# Patient Record
Sex: FEMALE | Race: WHITE | NOT HISPANIC OR LATINO | Employment: OTHER
[De-identification: names, ages, dates, MRNs, and addresses within clinical notes are randomized per-mention and may not be internally consistent; named-entity substitution may affect disease eponyms.]

---

## 2017-10-01 ENCOUNTER — HEALTH MAINTENANCE LETTER (OUTPATIENT)
Age: 37
End: 2017-10-01

## 2018-02-07 ENCOUNTER — OFFICE VISIT (OUTPATIENT)
Dept: PEDIATRICS | Facility: CLINIC | Age: 38
End: 2018-02-07
Payer: COMMERCIAL

## 2018-02-07 VITALS
DIASTOLIC BLOOD PRESSURE: 78 MMHG | HEART RATE: 83 BPM | OXYGEN SATURATION: 99 % | SYSTOLIC BLOOD PRESSURE: 110 MMHG | BODY MASS INDEX: 24.06 KG/M2 | HEIGHT: 66 IN | WEIGHT: 149.7 LBS | TEMPERATURE: 97.3 F

## 2018-02-07 DIAGNOSIS — L98.9 SKIN LESION OF CHEEK: ICD-10-CM

## 2018-02-07 DIAGNOSIS — M54.50 BILATERAL LOW BACK PAIN WITHOUT SCIATICA, UNSPECIFIED CHRONICITY: ICD-10-CM

## 2018-02-07 DIAGNOSIS — Z98.890 HISTORY OF LUMBAR SURGERY: ICD-10-CM

## 2018-02-07 DIAGNOSIS — Z13.1 SCREENING FOR DIABETES MELLITUS (DM): ICD-10-CM

## 2018-02-07 DIAGNOSIS — Z00.00 ROUTINE GENERAL MEDICAL EXAMINATION AT A HEALTH CARE FACILITY: Primary | ICD-10-CM

## 2018-02-07 DIAGNOSIS — Z83.49 FAMILY HISTORY OF THYROID DISORDER: ICD-10-CM

## 2018-02-07 DIAGNOSIS — N39.0 RECURRENT UTI: ICD-10-CM

## 2018-02-07 DIAGNOSIS — Z13.29 SCREENING FOR THYROID DISORDER: ICD-10-CM

## 2018-02-07 DIAGNOSIS — Z13.0 SCREENING FOR DEFICIENCY ANEMIA: ICD-10-CM

## 2018-02-07 DIAGNOSIS — Z12.4 SCREENING FOR CERVICAL CANCER: ICD-10-CM

## 2018-02-07 DIAGNOSIS — Z13.6 CARDIOVASCULAR SCREENING; LDL GOAL LESS THAN 160: ICD-10-CM

## 2018-02-07 PROCEDURE — 87624 HPV HI-RISK TYP POOLED RSLT: CPT | Performed by: FAMILY MEDICINE

## 2018-02-07 PROCEDURE — G0145 SCR C/V CYTO,THINLAYER,RESCR: HCPCS | Performed by: FAMILY MEDICINE

## 2018-02-07 PROCEDURE — 99385 PREV VISIT NEW AGE 18-39: CPT | Performed by: FAMILY MEDICINE

## 2018-02-07 RX ORDER — NITROFURANTOIN 25; 75 MG/1; MG/1
1 CAPSULE ORAL
COMMUNITY
Start: 2015-05-11 | End: 2018-02-07

## 2018-02-07 RX ORDER — NITROFURANTOIN 25; 75 MG/1; MG/1
CAPSULE ORAL
Qty: 90 CAPSULE | Refills: 1 | Status: SHIPPED | OUTPATIENT
Start: 2018-02-07 | End: 2018-12-29

## 2018-02-07 ASSESSMENT — PAIN SCALES - GENERAL: PAINLEVEL: NO PAIN (0)

## 2018-02-07 NOTE — PATIENT INSTRUCTIONS
Reset my chart  [employer] Login ID: SHARMAINE       Schedule for skin consult  Schedule for sports consult  Schedule for fasting labs in 2 weeks        Preventive Health Recommendations  Female Ages 26 - 39  Yearly exam:   See your health care provider every year in order to    Review health changes.     Discuss preventive care.      Review your medicines if you your doctor has prescribed any.    Until age 30: Get a Pap test every three years (more often if you have had an abnormal result).    After age 30: Talk to your doctor about whether you should have a Pap test every 3 years or have a Pap test with HPV screening every 5 years.   You do not need a Pap test if your uterus was removed (hysterectomy) and you have not had cancer.  You should be tested each year for STDs (sexually transmitted diseases), if you're at risk.   Talk to your provider about how often to have your cholesterol checked.  If you are at risk for diabetes, you should have a diabetes test (fasting glucose).  Shots: Get a flu shot each year. Get a tetanus shot every 10 years.   Nutrition:     Eat at least 5 servings of fruits and vegetables each day.    Eat whole-grain bread, whole-wheat pasta and brown rice instead of white grains and rice.    Talk to your provider about Calcium and Vitamin D.     Lifestyle    Exercise at least 150 minutes a week (30 minutes a day, 5 days of the week). This will help you control your weight and prevent disease.    Limit alcohol to one drink per day.    No smoking.     Wear sunscreen to prevent skin cancer.    See your dentist every six months for an exam and cleaning.

## 2018-02-07 NOTE — NURSING NOTE
"Chief Complaint   Patient presents with     Establish Care     Physical       Initial /78 (BP Location: Right arm, Patient Position: Sitting, Cuff Size: Adult Large)  Pulse 83  Temp 97.3  F (36.3  C) (Oral)  Ht 5' 6.14\" (1.68 m)  Wt 149 lb 11.2 oz (67.9 kg)  LMP 01/22/2018 (Exact Date)  SpO2 99%  BMI 24.06 kg/m2 Estimated body mass index is 24.06 kg/(m^2) as calculated from the following:    Height as of this encounter: 5' 6.14\" (1.68 m).    Weight as of this encounter: 149 lb 11.2 oz (67.9 kg).  Medication Reconciliation: complete  Silvino aMrcos CMA    "

## 2018-02-07 NOTE — MR AVS SNAPSHOT
After Visit Summary   2/7/2018    Jaylin Day    MRN: [id number]           Patient Information     Date Of Birth          1980        Visit Information        Provider Department      2/7/2018 11:10 AM Parminder Taveras MD [facility]        Today's Diagnoses     Routine general medical examination at a health care facility    -  1    Recurrent UTI        CARDIOVASCULAR SCREENING; LDL GOAL LESS THAN 160        S/P lumbar spine operation        Bilateral low back pain without sciatica, unspecified chronicity        Skin lesion of cheek        Screening for cervical cancer          Care Instructions    Reset my chart  MyChart Login ID: [email or web address]       Schedule for skin consult  Schedule for sports consult  Schedule for fasting labs in 2 weeks        Preventive Health Recommendations  Female Ages 26 - 39  Yearly exam:   See your health care provider every year in order to    Review health changes.     Discuss preventive care.      Review your medicines if you your doctor has prescribed any.    Until age 30: Get a Pap test every three years (more often if you have had an abnormal result).    After age 30: Talk to your doctor about whether you should have a Pap test every 3 years or have a Pap test with HPV screening every 5 years.   You do not need a Pap test if your uterus was removed (hysterectomy) and you have not had cancer.  You should be tested each year for STDs (sexually transmitted diseases), if you're at risk.   Talk to your provider about how often to have your cholesterol checked.  If you are at risk for diabetes, you should have a diabetes test (fasting glucose).  Shots: Get a flu shot each year. Get a tetanus shot every 10 years.   Nutrition:     Eat at least 5 servings of fruits and vegetables each day.    Eat whole-grain bread, whole-wheat pasta and brown rice instead of white grains and rice.    Talk to your provider about Calcium and Vitamin D.      Lifestyle    Exercise at least 150 minutes a week (30 minutes a day, 5 days of the week). This will help you control your weight and prevent disease.    Limit alcohol to one drink per day.    No smoking.     Wear sunscreen to prevent skin cancer.    See your dentist every six months for an exam and cleaning.            Follow-ups after your visit        Additional Services     DERMATOLOGY REFERRAL       Your provider has referred you to: [facility]: [facility] [phone]   [email or web address]    Please be aware that coverage of these services is subject to the terms and limitations of your health insurance plan.  Call member services at your health plan with any benefit or coverage questions.      Please bring the following with you to your appointment:    (1) Any X-Rays, CTs or MRIs which have been performed.  Contact the facility where they were done to arrange for  prior to your scheduled appointment.    (2) List of current medications  (3) This referral request   (4) Any documents/labs given to you for this referral            SPORTS MEDICINE REFERRAL       Your provider has referred you to:  G: [facility] [phone]   [email or web address]/    Please be aware that coverage of these services is subject to the terms and limitations of your health insurance plan.  Call member services at your health plan with any benefit or coverage questions.      Please bring the following to your appointment:    >>   Any x-rays, CTs or MRIs which have been performed.  Contact the facility where they were done to arrange for  prior to your scheduled appointment.    >>   List of current medications   >>   This referral request   >>   Any documents/labs given to you for this referral                  Who to contact     If you have questions or need follow up information  "about today's clinic visit or your schedule please contact [facility] directly at [phone].  Normal or non-critical lab and imaging results will be communicated to you by Enlikenhart, letter or phone within 4 business days after the clinic has received the results. If you do not hear from us within 7 days, please contact the clinic through Enlikenhart or phone. If you have a critical or abnormal lab result, we will notify you by phone as soon as possible.  Submit refill requests through Oonair or call your pharmacy and they will forward the refill request to us. Please allow 3 business days for your refill to be completed.          Additional Information About Your Visit        EnlikenhariJento Information     [employer]air gives you secure access to your electronic health record. If you see a primary care provider, you can also send messages to your care team and make appointments. If you have questions, please call your primary care clinic.  If you do not have a primary care provider, please call [phone] and they will assist you.      Oonair is an electronic gateway that provides easy, online access to your medical records. With Oonair, you can request a clinic appointment, read your test results, renew a prescription or communicate with your care team.     To access your existing account, please contact your [facility] or call [phone] for assistance.        Care EveryWhere ID     This is your Care EveryWhere ID. This could be used by other organizations to access your [place] medical records  [id number]        Your Vitals Were     Pulse Temperature Height Last Period Pulse Oximetry BMI (Body Mass Index)    83 97.3  F (36.3  C) (Oral) 5' 6.14\" (1.68 m) 01/22/2018 (Exact Date) 99% 24.06 kg/m2       Blood Pressure from Last 3 Encounters:   02/07/18 110/78   12/10/12 116/78   08/16/12 102/76    Weight from Last 3 Encounters:   02/07/18 149 lb 11.2 oz (67.9 kg)   03/06/13 " 143 lb (64.9 kg)   12/10/12 149 lb (67.6 kg)              We Performed the Following     DERMATOLOGY REFERRAL     HPV High Risk Types DNA Cervical     Pap imaged thin layer screen with HPV - recommended age 30 - 65 years (select HPV order below)     SPORTS MEDICINE REFERRAL          Today's Medication Changes          These changes are accurate as of 2/7/18 11:57 AM.  If you have any questions, ask your nurse or doctor.               These medicines have changed or have updated prescriptions.        Dose/Directions    nitroFURantoin (macrocrystal-monohydrate) 100 MG capsule   Commonly known as:  MACROBID   This may have changed:    - how much to take  - how to take this  - when to take this  - additional instructions   Used for:  Recurrent UTI   Changed by:  Parminder Taveras MD        Take one capsule as needed after intercourse   Quantity:  90 capsule   Refills:  1            Where to get your medicines      These medications were sent to [id number] - [place], [place] - [address] AT [facility] [address], [facility] [address]     Phone:  [phone]     nitroFURantoin (macrocrystal-monohydrate) 100 MG capsule                Primary Care Provider Office Phone # Fax #    Parminder Taveras [phone] [address]  [facility] [address]        Equal Access to Services     [facility] AH: Hadii aad ku hadasho Soomaali, waaxda luqadaha, qaybta kaalmada adeegyada, twan barnes . So [facility] [phone].    ATENCIÓN: Si habla español, tiene a rosado disposición servicios gratuitos de asistencia lingüística. Llame al [phone].    We comply with applicable federal civil rights laws and [place] laws. We do not discriminate on the basis of race, color, national origin, age, disability, sex, sexual orientation, or gender identity.            Thank you!     Thank you for choosing [facility]  for your care.  Our goal is always to provide you with excellent care. Hearing back from our patients is one way we can continue to improve our services. Please take a few minutes to complete the written survey that you may receive in the mail after your visit with us. Thank you!             Your Updated Medication List - Protect others around you: Learn how to safely use, store and throw away your medicines at www.disposemymeds.org.          This list is accurate as of 2/7/18 11:57 AM.  Always use your most recent med list.                   Brand Name Dispense Instructions for use Diagnosis    ibuprofen 200 MG tablet    ADVIL/MOTRIN     Take 200-600 mg by mouth every 4 hours as needed for mild pain or moderate pain        MULTIVITAMIN TABS   OR      1 TABLET DAILY    Urinary tract infection, site not specified, UTI (urinary tract infection)       nitroFURantoin (macrocrystal-monohydrate) 100 MG capsule    MACROBID    90 capsule    Take one capsule as needed after intercourse    Recurrent UTI

## 2018-02-07 NOTE — PROGRESS NOTES
SUBJECTIVE:   CC: Jaylin Day is an 37 year old woman who presents for preventive health visit.     Healthy Habits:  This is a new patient to this provider,  here today to establish care and for annual physical.    Patient is switching clinics from [facility] due to change in insurance, was seen by [facility] provider 5 years ago.    Do you get at least three servings of calcium containing foods daily (dairy, green leafy vegetables, etc.)? yes    Amount of exercise or daily activities, outside of work: 3 day(s) per week    Problems taking medications regularly No    Medication side effects: No    Have you had an eye exam in the past two years? yes    Do you see a dentist twice per year? yes    Do you have sleep apnea, excessive snoring or daytime drowsiness?no      Pap smear done on this date: 4/14/14 (approximately), by this group: [facility], results were normal.           Today's PHQ-2 Score:   PHQ-2 ( 1999 Pfizer) 2/7/2018 8/16/2012   Q1: Little interest or pleasure in doing things 0 0   Q2: Feeling down, depressed or hopeless 0 0   PHQ-2 Score 0 0       Abuse: Current or Past(Physical, Sexual or Emotional)- No  Do you feel safe in your environment - Yes    Social History   Substance Use Topics     Smoking status: Former Smoker     Types: Cigarettes     Quit date: 1/1/2006     Smokeless tobacco: Never Used     Alcohol use Yes      Comment: rare     If you drink alcohol do you typically have >3 drinks per day or >7 drinks per week? No                     Reviewed orders with patient.  Reviewed health maintenance and updated orders accordingly - Yes  Labs reviewed in EPIC  BP Readings from Last 3 Encounters:   02/07/18 110/78   12/10/12 116/78   08/16/12 102/76    Wt Readings from Last 3 Encounters:   02/07/18 149 lb 11.2 oz (67.9 kg)   03/06/13 143 lb (64.9 kg)   12/10/12 149 lb (67.6 kg)                  Patient Active Problem List   Diagnosis     Recurrent UTI     CARDIOVASCULAR SCREENING; LDL GOAL LESS  THAN 160     Disorder of bursae and tendons in shoulder region     DUB (dysfunctional uterine bleeding)     S/P lumbar spine operation     Bilateral low back pain without sciatica, unspecified chronicity     History of lumbar surgery     Family history of thyroid disorder     Past Surgical History:   Procedure Laterality Date     DECOMPRESSION, FUSION LUMBAR POSTERIOR THREE + LEVELS, COMBINED         Social History   Substance Use Topics     Smoking status: Former Smoker     Types: Cigarettes     Quit date: 1/1/2006     Smokeless tobacco: Never Used     Alcohol use Yes      Comment: rare     Family History   Problem Relation Age of Onset     Hypertension Maternal Uncle      Hypertension Maternal Grandmother      C.A.D. No family hx of      DIABETES No family hx of      Thyroid Disease Son      hypothyroidism, congenital         Current Outpatient Prescriptions   Medication Sig Dispense Refill     nitroFURantoin, macrocrystal-monohydrate, (MACROBID) 100 MG capsule Take one capsule as needed after intercourse 90 capsule 1     ibuprofen (ADVIL,MOTRIN) 200 MG tablet Take 200-600 mg by mouth every 4 hours as needed for mild pain or moderate pain        MULTIVITAMIN TABS   OR 1 TABLET DAILY       No Known Allergies  Recent Labs   Lab Test  04/27/12   0825  10/29/11   0940   LDL  97   --    HDL  66   --    TRIG  84   --    ALT   --   27   CR   --   0.73   GFRESTIMATED   --   >90   GFRESTBLACK   --   >90   POTASSIUM   --   4.3   TSH  1.51   --         Mammogram not appropriate for this patient based on age.    Pertinent mammograms are reviewed under the imaging tab.  History of abnormal Pap smear: NO - age 30- 65 PAP every 3 years recommended    Reviewed and updated as needed this visit by clinical staff  Tobacco  Allergies  Meds         Reviewed and updated as needed this visit by Provider          Past Medical History:   Diagnosis Date     Female pelvic pain 2008    possible mild bicornuate or septate uterus      "Migraines     resolved      Preeclampsia      Recurrent UTI       Past Surgical History:   Procedure Laterality Date     DECOMPRESSION, FUSION LUMBAR POSTERIOR THREE + LEVELS, COMBINED       Obstetric History       T0      L3     SAB0   TAB0   Ectopic0   Multiple0   Live Births0       # Outcome Date GA Lbr Satish/2nd Weight Sex Delivery Anes PTL Lv   3 Para            2 Para            1 Para                   ROS:  C: NEGATIVE for fever, chills, change in weight  I: NEGATIVE -discolored skin on the left cheek  E: NEGATIVE for vision changes or irritation  ENT: NEGATIVE for ear, mouth and throat problems  R: NEGATIVE for significant cough or SOB  B: NEGATIVE for masses, tenderness or discharge  CV: NEGATIVE for chest pain, palpitations or peripheral edema  GI: NEGATIVE for nausea, abdominal pain, heartburn, or change in bowel habits  : NEGATIVE for unusual urinary or vaginal symptoms. Periods are regular.   female: History of recurrent UTI.  MUSCULOSKELETAL: History of intermittent low back pain, status post spine surgery in , wants to see  orthopedics for ongoing follow-up  N: NEGATIVE for weakness, dizziness or paresthesias  E: NEGATIVE for temperature intolerance, skin/hair changes  H: NEGATIVE for bleeding problems  P: NEGATIVE for changes in mood or affect    OBJECTIVE:   /78 (BP Location: Right arm, Patient Position: Sitting, Cuff Size: Adult Large)  Pulse 83  Temp 97.3  F (36.3  C) (Oral)  Ht 5' 6.14\" (1.68 m)  Wt 149 lb 11.2 oz (67.9 kg)  LMP 2018 (Exact Date)  SpO2 99%  BMI 24.06 kg/m2  EXAM:  GENERAL: healthy, alert and no distress  EYES: Eyes grossly normal to inspection, PERRL and conjunctivae and sclerae normal  HENT: ear canals and TM's normal, nose and mouth without ulcers or lesions  NECK: no adenopathy, no asymmetry, masses, or scars and thyroid normal to palpation  RESP: lungs clear to auscultation - no rales, rhonchi or wheezes  BREAST: normal without " masses, tenderness or nipple discharge and no palpable axillary masses or adenopathy  CV: regular rate and rhythm, normal S1 S2, no S3 or S4, no murmur, click or rub, no peripheral edema and peripheral pulses strong  ABDOMEN: soft, nontender, no hepatosplenomegaly, no masses and bowel sounds normal   (female): normal female external genitalia, normal urethral meatus, vaginal mucosa pink, moist, well rugated, and normal cervix/adnexa/uterus without masses or discharge  MS: no gross musculoskeletal defects noted, no edema  SKIN: About 2 cm, round area of dyschromia of the left cheek  NEURO: Normal strength and tone, mentation intact and speech normal  PSYCH: mentation appears normal, affect normal/bright    ASSESSMENT/PLAN:   1. Routine general medical examination at a health care facility  Discussed on regular exercises, healthy eating, self breast exams monthly and routine dental checks.    - Pap imaged thin layer screen with HPV - recommended age 30 - 65 years (select HPV order below)  - HPV High Risk Types DNA Cervical  - CBC with platelets differential; Future  - **Comprehensive metabolic panel FUTURE anytime; Future  - Lipid panel reflex to direct LDL Fasting; Future  - TSH with free T4 reflex; Future    2. Recurrent UTI  Prescription refill given for Macrodantin to use after intercourse as needed   reviewed prevention of recurrent UTI  - nitroFURantoin, macrocrystal-monohydrate, (MACROBID) 100 MG capsule; Take one capsule as needed after intercourse  Dispense: 90 capsule; Refill: 1    3. CARDIOVASCULAR SCREENING; LDL GOAL LESS THAN 160    - Lipid panel reflex to direct LDL Fasting; Future    4. Bilateral low back pain without sciatica, unspecified chronicity  Per patient's request, refer to sports medicine for further evaluation of her ongoing low back pain.  - SPORTS MEDICINE REFERRAL    5. Skin lesion of cheek  Per patient's request, dermatology referral was placed  - DERMATOLOGY REFERRAL    6. Screening  "for cervical cancer    - Pap imaged thin layer screen with HPV - recommended age 30 - 65 years (select HPV order below)  - HPV High Risk Types DNA Cervical    7. History of lumbar surgery    - SPORTS MEDICINE REFERRAL    8. Family history of thyroid disorder    - TSH with free T4 reflex; Future    9. Screening for thyroid disorder    - TSH with free T4 reflex; Future    10. Screening for deficiency anemia    - CBC with platelets differential; Future    11. Screening for diabetes mellitus (DM)    - **Comprehensive metabolic panel FUTURE anytime; Future    COUNSELING:   Reviewed preventive health counseling, as reflected in patient instructions  Special attention given to:        Regular exercise       Healthy diet/nutrition       Vision screening       Immunizations    Declined: Influenza due to Other                Contraception       Family planning       Folic Acid Counseling         reports that she quit smoking about 12 years ago. Her smoking use included Cigarettes. She has never used smokeless tobacco.    Estimated body mass index is 24.06 kg/(m^2) as calculated from the following:    Height as of this encounter: 5' 6.14\" (1.68 m).    Weight as of this encounter: 149 lb 11.2 oz (67.9 kg).       Counseling Resources:  ATP IV Guidelines  Pooled Cohorts Equation Calculator  Breast Cancer Risk Calculator  FRAX Risk Assessment  ICSI Preventive Guidelines  Dietary Guidelines for Americans, 2010  USDA's MyPlate  ASA Prophylaxis  Lung CA Screening    Parminder Taveras MD  [facility]  Chart documentation done in part with Dragon Voice recognition Software. Although reviewed after completion, some word and grammatical error may remain.    "

## 2018-02-09 LAB
COPATH REPORT: NORMAL
PAP: NORMAL

## 2018-02-14 LAB
FINAL DIAGNOSIS: NORMAL
HPV HR 12 DNA CVX QL NAA+PROBE: NEGATIVE
HPV16 DNA SPEC QL NAA+PROBE: NEGATIVE
HPV18 DNA SPEC QL NAA+PROBE: NEGATIVE
SPECIMEN DESCRIPTION: NORMAL
SPECIMEN SOURCE CVX/VAG CYTO: NORMAL

## 2018-02-21 ENCOUNTER — OFFICE VISIT (OUTPATIENT)
Dept: ORTHOPEDICS | Facility: CLINIC | Age: 38
End: 2018-02-21
Attending: FAMILY MEDICINE
Payer: COMMERCIAL

## 2018-02-21 VITALS
HEART RATE: 107 BPM | WEIGHT: 149 LBS | HEIGHT: 66 IN | DIASTOLIC BLOOD PRESSURE: 78 MMHG | SYSTOLIC BLOOD PRESSURE: 112 MMHG | BODY MASS INDEX: 23.95 KG/M2 | OXYGEN SATURATION: 99 %

## 2018-02-21 DIAGNOSIS — M54.16 LUMBAR RADICULOPATHY: Primary | ICD-10-CM

## 2018-02-21 DIAGNOSIS — Z13.6 CARDIOVASCULAR SCREENING; LDL GOAL LESS THAN 160: ICD-10-CM

## 2018-02-21 DIAGNOSIS — Z83.49 FAMILY HISTORY OF THYROID DISORDER: ICD-10-CM

## 2018-02-21 DIAGNOSIS — Z13.0 SCREENING FOR DEFICIENCY ANEMIA: ICD-10-CM

## 2018-02-21 DIAGNOSIS — Z13.1 SCREENING FOR DIABETES MELLITUS (DM): ICD-10-CM

## 2018-02-21 DIAGNOSIS — Z13.29 SCREENING FOR THYROID DISORDER: ICD-10-CM

## 2018-02-21 DIAGNOSIS — Z00.00 ROUTINE GENERAL MEDICAL EXAMINATION AT A HEALTH CARE FACILITY: ICD-10-CM

## 2018-02-21 LAB
ALBUMIN SERPL-MCNC: 3.6 G/DL (ref 3.4–5)
ALP SERPL-CCNC: 51 U/L (ref 40–150)
ALT SERPL W P-5'-P-CCNC: 21 U/L (ref 0–50)
ANION GAP SERPL CALCULATED.3IONS-SCNC: 6 MMOL/L (ref 3–14)
AST SERPL W P-5'-P-CCNC: 13 U/L (ref 0–45)
BASOPHILS # BLD AUTO: 0 10E9/L (ref 0–0.2)
BASOPHILS NFR BLD AUTO: 0.4 %
BILIRUB SERPL-MCNC: 0.5 MG/DL (ref 0.2–1.3)
BUN SERPL-MCNC: 14 MG/DL (ref 7–30)
CALCIUM SERPL-MCNC: 8.5 MG/DL (ref 8.5–10.1)
CHLORIDE SERPL-SCNC: 105 MMOL/L (ref 94–109)
CHOLEST SERPL-MCNC: 189 MG/DL
CO2 SERPL-SCNC: 27 MMOL/L (ref 20–32)
CREAT SERPL-MCNC: 0.61 MG/DL (ref 0.52–1.04)
DIFFERENTIAL METHOD BLD: NORMAL
EOSINOPHIL # BLD AUTO: 0.1 10E9/L (ref 0–0.7)
EOSINOPHIL NFR BLD AUTO: 1.6 %
ERYTHROCYTE [DISTWIDTH] IN BLOOD BY AUTOMATED COUNT: 12.5 % (ref 10–15)
GFR SERPL CREATININE-BSD FRML MDRD: >90 ML/MIN/1.7M2
GLUCOSE SERPL-MCNC: 94 MG/DL (ref 70–99)
HCT VFR BLD AUTO: 43.8 % (ref 35–47)
HDLC SERPL-MCNC: 69 MG/DL
HGB BLD-MCNC: 14.1 G/DL (ref 11.7–15.7)
IMM GRANULOCYTES # BLD: 0 10E9/L (ref 0–0.4)
IMM GRANULOCYTES NFR BLD: 0.4 %
LDLC SERPL CALC-MCNC: 103 MG/DL
LYMPHOCYTES # BLD AUTO: 1.8 10E9/L (ref 0.8–5.3)
LYMPHOCYTES NFR BLD AUTO: 31.9 %
MCH RBC QN AUTO: 29.7 PG (ref 26.5–33)
MCHC RBC AUTO-ENTMCNC: 32.2 G/DL (ref 31.5–36.5)
MCV RBC AUTO: 92 FL (ref 78–100)
MONOCYTES # BLD AUTO: 0.5 10E9/L (ref 0–1.3)
MONOCYTES NFR BLD AUTO: 8.1 %
NEUTROPHILS # BLD AUTO: 3.3 10E9/L (ref 1.6–8.3)
NEUTROPHILS NFR BLD AUTO: 57.6 %
NONHDLC SERPL-MCNC: 120 MG/DL
PLATELET # BLD AUTO: 252 10E9/L (ref 150–450)
POTASSIUM SERPL-SCNC: 4.2 MMOL/L (ref 3.4–5.3)
PROT SERPL-MCNC: 7.2 G/DL (ref 6.8–8.8)
RBC # BLD AUTO: 4.75 10E12/L (ref 3.8–5.2)
SODIUM SERPL-SCNC: 138 MMOL/L (ref 133–144)
TRIGL SERPL-MCNC: 83 MG/DL
TSH SERPL DL<=0.005 MIU/L-ACNC: 1.46 MU/L (ref 0.4–4)
WBC # BLD AUTO: 5.7 10E9/L (ref 4–11)

## 2018-02-21 PROCEDURE — 80050 GENERAL HEALTH PANEL: CPT | Performed by: FAMILY MEDICINE

## 2018-02-21 PROCEDURE — 80061 LIPID PANEL: CPT | Performed by: FAMILY MEDICINE

## 2018-02-21 PROCEDURE — 99213 OFFICE O/P EST LOW 20 MIN: CPT | Performed by: FAMILY MEDICINE

## 2018-02-21 PROCEDURE — 36415 COLL VENOUS BLD VENIPUNCTURE: CPT | Performed by: FAMILY MEDICINE

## 2018-02-21 ASSESSMENT — PAIN SCALES - GENERAL: PAINLEVEL: MILD PAIN (2)

## 2018-02-21 NOTE — LETTER
2/21/2018         RE: Jaylin Day  [address] [place] [address]        Dear Colleague,    Thank you for referring your patient, Jaylin Day, to the [facility]. Please see a copy of my visit note below.    HISTORY OF PRESENT ILLNESS  Ms. Day is a very pleasant 37 year old year old female who presents to clinic today to establish care at the request of Dr. Taveras.  Jaylin (Shelley) explains that she had a microdiscectomy in September 2016 for severe sciatica. Before surgery, she was a very active runner. She has also worked as a  for 18 years. She would like to begin an exercise routine but is worried about injuring her back and is here today with advice about beginning and exercise routine after 1.5 years of inactivity. She reports that she did not do PT after her surgery as advised by her neurosurgeon.  Location: lower back pain  Quality:  achy pain    Severity: 5/10 at worst    Duration: 1.5 years  Timing: occurs intermittently  Context: occurs while exercising and lifting  Modifying factors:  resting and non-use makes it better, movement and use makes it worse  Associated signs & symptoms: none  Previous similar pain: yes  Exercise: lifting weights and cardiovascular on machine  Additional history: as documented    MEDICAL HISTORY  Patient Active Problem List   Diagnosis     Recurrent UTI     CARDIOVASCULAR SCREENING; LDL GOAL LESS THAN 160     Disorder of bursae and tendons in shoulder region     DUB (dysfunctional uterine bleeding)     S/P lumbar spine operation     Bilateral low back pain without sciatica, unspecified chronicity     History of lumbar surgery     Family history of thyroid disorder       Current Outpatient Prescriptions   Medication Sig Dispense Refill     nitroFURantoin, macrocrystal-monohydrate, (MACROBID) 100 MG capsule Take one capsule as needed after intercourse 90 capsule 1     ibuprofen (ADVIL,MOTRIN) 200 MG tablet Take  "200-600 mg by mouth every 4 hours as needed for mild pain or moderate pain        MULTIVITAMIN TABS   OR 1 TABLET DAILY         Allergies   Allergen Reactions     Methocarbamol Hives       Family History   Problem Relation Age of Onset     Hypertension Maternal Grandmother      Thyroid Disease Son      hypothyroidism, congenital     Hypertension Maternal Uncle      C.A.D. No family hx of      DIABETES No family hx of        Additional medical/Social/Surgical histories reviewed in EPIC and updated as appropriate.     REVIEW OF SYSTEMS (2/21/2018)  10 point ROS of systems including Constitutional, Eyes, Respiratory, Cardiovascular, Gastroenterology, Genitourinary, Integumentary, Musculoskeletal, Psychiatric were all negative except for pertinent positives noted in my HPI.     PHYSICAL EXAM  Vitals:    02/21/18 1014   BP: 112/78   Pulse: 107   SpO2: 99%   Weight: 67.6 kg (149 lb)   Height: 1.68 m (5' 6.14\")     General  - normal appearance, in no obvious distress  CV  - normal peripheral perfusion  Pulm  - normal respiratory pattern, non-labored  Musculoskeletal - lumbar spine  - stance: normal gait without limp, no obvious leg length discrepancy, normal heel and toe walk  - inspection: normal bone and joint alignment, no obvious scoliosis  - palpation: no paravertebral or bony tenderness  - strength: lower extremities 5/5 in all planes  - special tests:  (-) slump test  Neuro  - patellar and Achilles DTRs 2+ bilaterally, grossly normal coordination, normal muscle tone  Skin  - no ecchymosis, erythema, warmth, or induration, no obvious rash  Psych  - interactive, appropriate, normal mood and affect      ASSESSMENT & PLAN    Mrs. Day is a pleasant 37 year old female s/p lumbar discectomy in 2016 presenting today with mild chronic back pain and sciatica.     Shelley seems to be doing quite well but still has twinges of pain on occasion.  She is mostly interested in discussing exercise and how to safely initiate " activity moving forward.    We had a long discussion centered around what is safe and not safe to do moving forward.  We discussed that impact activities are more likely to cause her pain, although I do think it is reasonable to discuss running.  If she does we should do this very carefully and safely to prevent injury.    I referred Shelley to PT. Additionally, we talked about the importance of re-establishing core strength; I suggest pilates.   She set a goal of a 5K this summer -as long as she is progressing well and is relatively pain-free I think this is reasonable.    I will see Ms. Day in follow up in 6 weeks time.    It was a pleasure meeting Shelley today.      Kaushik Lockett DO, [facility]      Again, thank you for allowing me to participate in the care of your patient.        Sincerely,        Kaushik Lockett DO

## 2018-02-21 NOTE — PROGRESS NOTES
HISTORY OF PRESENT ILLNESS  Ms. Day is a very pleasant 37 year old year old female who presents to clinic today to establish care at the request of Dr. Taveras.  Jaylin (Shelley) explains that she had a microdiscectomy in September 2016 for severe sciatica. Before surgery, she was a very active runner. She has also worked as a  for 18 years. She would like to begin an exercise routine but is worried about injuring her back and is here today with advice about beginning and exercise routine after 1.5 years of inactivity. She reports that she did not do PT after her surgery as advised by her neurosurgeon.  Location: lower back pain  Quality:  achy pain    Severity: 5/10 at worst    Duration: 1.5 years  Timing: occurs intermittently  Context: occurs while exercising and lifting  Modifying factors:  resting and non-use makes it better, movement and use makes it worse  Associated signs & symptoms: none  Previous similar pain: yes  Exercise: lifting weights and cardiovascular on machine  Additional history: as documented    MEDICAL HISTORY  Patient Active Problem List   Diagnosis     Recurrent UTI     CARDIOVASCULAR SCREENING; LDL GOAL LESS THAN 160     Disorder of bursae and tendons in shoulder region     DUB (dysfunctional uterine bleeding)     S/P lumbar spine operation     Bilateral low back pain without sciatica, unspecified chronicity     History of lumbar surgery     Family history of thyroid disorder       Current Outpatient Prescriptions   Medication Sig Dispense Refill     nitroFURantoin, macrocrystal-monohydrate, (MACROBID) 100 MG capsule Take one capsule as needed after intercourse 90 capsule 1     ibuprofen (ADVIL,MOTRIN) 200 MG tablet Take 200-600 mg by mouth every 4 hours as needed for mild pain or moderate pain        MULTIVITAMIN TABS   OR 1 TABLET DAILY         Allergies   Allergen Reactions     Methocarbamol Hives       Family History   Problem Relation Age of Onset     Hypertension Maternal  "Grandmother      Thyroid Disease Son      hypothyroidism, congenital     Hypertension Maternal Uncle      C.A.D. No family hx of      DIABETES No family hx of        Additional medical/Social/Surgical histories reviewed in [facility] and updated as appropriate.     REVIEW OF SYSTEMS (2/21/2018)  10 point ROS of systems including Constitutional, Eyes, Respiratory, Cardiovascular, Gastroenterology, Genitourinary, Integumentary, Musculoskeletal, Psychiatric were all negative except for pertinent positives noted in my HPI.     PHYSICAL EXAM  Vitals:    02/21/18 1014   BP: 112/78   Pulse: 107   SpO2: 99%   Weight: 67.6 kg (149 lb)   Height: 1.68 m (5' 6.14\")     General  - normal appearance, in no obvious distress  CV  - normal peripheral perfusion  Pulm  - normal respiratory pattern, non-labored  Musculoskeletal - lumbar spine  - stance: normal gait without limp, no obvious leg length discrepancy, normal heel and toe walk  - inspection: normal bone and joint alignment, no obvious scoliosis  - palpation: no paravertebral or bony tenderness  - strength: lower extremities 5/5 in all planes  - special tests:  (-) slump test  Neuro  - patellar and Achilles DTRs 2+ bilaterally, grossly normal coordination, normal muscle tone  Skin  - no ecchymosis, erythema, warmth, or induration, no obvious rash  Psych  - interactive, appropriate, normal mood and affect      ASSESSMENT & PLAN    Mrs. Day is a pleasant 37 year old female s/p lumbar discectomy in 2016 presenting today with mild chronic back pain and sciatica.     Shelley seems to be doing quite well but still has twinges of pain on occasion.  She is mostly interested in discussing exercise and how to safely initiate activity moving forward.    We had a long discussion centered around what is safe and not safe to do moving forward.  We discussed that impact activities are more likely to cause her pain, although I do think it is reasonable to discuss running.  If she does we should " do this very carefully and safely to prevent injury.    I referred Shelley to PT. Additionally, we talked about the importance of re-establishing core strength; I suggest pilates.   She set a goal of a 5K this summer -as long as she is progressing well and is relatively pain-free I think this is reasonable.    I will see Ms. Day in follow up in 6 weeks time.    It was a pleasure meeting Shelley today.      Kaushik Lockett DO, CAJONOM

## 2018-02-21 NOTE — MR AVS SNAPSHOT
After Visit Summary   2018    Jaylin Day    MRN: [id number]           Patient Information     Date Of Birth          1980        Visit Information        Provider Department      2018 10:20 AM Kaushik Lockett,  [facility]        Today's Diagnoses     Lumbar radiculopathy    -  1      Care Instructions    Thanks for coming today.  Ortho[facility]Sports Medicine Clinic  [address] [place] [address]    To schedule future appointments in Ortho Clinic, you may call [phone].    To schedule ordered imaging by your provider:   Call Central Imaging [phone]    To schedule an injection ordered by your provider:  Call Central Imaging Injection scheduling line: [phone]  [employer] available online at:  [email or web address]    Please call if any further questions or concerns ([phone]).  Clinic hours 8 am to 5 pm.    Return to clinic (call) if symptoms worsen or fail to improve.            Follow-ups after your visit        Additional Services     PHYSICAL THERAPY REFERRAL       Please eval and treat for chronic low back pain                  Your next 10 appointments already scheduled     Mar 02, 2018  9:00 AM CST   [facility]    [address] [place] [address] [phone]              Who to contact     If you have questions or need follow up information about today's clinic visit or your schedule please contact [facility] directly at [phone].  Normal or non-critical lab and imaging results will be communicated to you by MyChart, letter or phone within 4 business days after the clinic has received the results. If you do not hear from us within 7 days, please contact the clinic through MyChart or phone. If you have a critical or abnormal lab result, we will notify you by phone as soon as possible.  Submit refill  "requests through [employer] or call your pharmacy and they will forward the refill request to us. Please allow 3 business days for your refill to be completed.          Additional Information About Your Visit        [employer] Information     [employer] gives you secure access to your electronic health record. If you see a primary care provider, you can also send messages to your care team and make appointments. If you have questions, please call your primary care clinic.  If you do not have a primary care provider, please call [phone] and they will assist you.      [employer] is an electronic gateway that provides easy, online access to your medical records. With [employer], you can request a clinic appointment, read your test results, renew a prescription or communicate with your care team.     To access your existing account, please contact your [facility] or call [phone] for assistance.        Care EveryWhere ID     This is your Care EveryWhere ID. This could be used by other organizations to access your [place] medical records  [id number]        Your Vitals Were     Pulse Height Last Period Pulse Oximetry BMI (Body Mass Index)       107 1.68 m (5' 6.14\") 01/22/2018 (Exact Date) 99% 23.95 kg/m2        Blood Pressure from Last 3 Encounters:   02/21/18 112/78   02/07/18 110/78   12/10/12 116/78    Weight from Last 3 Encounters:   02/21/18 67.6 kg (149 lb)   02/07/18 67.9 kg (149 lb 11.2 oz)   03/06/13 64.9 kg (143 lb)              We Performed the Following     PHYSICAL THERAPY REFERRAL        Primary Care Provider Office Phone # Fax #    Parminder Taveras [phone]       [address]  [facility] [address]        Equal Access to Services     [facility] : Hadii zaina Pena, errol roger, twan evans. So [facility] [phone].    ATENCIÓN: Si habla español, tiene a rosado disposición servicios " soraida de asistencia lingüística. Nico mercado [phone].    We comply with applicable federal civil rights laws and [place] laws. We do not discriminate on the basis of race, color, national origin, age, disability, sex, sexual orientation, or gender identity.            Thank you!     Thank you for choosing [facility]  for your care. Our goal is always to provide you with excellent care. Hearing back from our patients is one way we can continue to improve our services. Please take a few minutes to complete the written survey that you may receive in the mail after your visit with us. Thank you!             Your Updated Medication List - Protect others around you: Learn how to safely use, store and throw away your medicines at www.disposemymeds.org.          This list is accurate as of 2/21/18 11:02 AM.  Always use your most recent med list.                   Brand Name Dispense Instructions for use Diagnosis    ibuprofen 200 MG tablet    ADVIL/MOTRIN     Take 200-600 mg by mouth every 4 hours as needed for mild pain or moderate pain        MULTIVITAMIN TABS   OR      1 TABLET DAILY    Urinary tract infection, site not specified, UTI (urinary tract infection)       nitroFURantoin (macrocrystal-monohydrate) 100 MG capsule    MACROBID    90 capsule    Take one capsule as needed after intercourse    Recurrent UTI

## 2018-02-21 NOTE — PATIENT INSTRUCTIONS
Thanks for coming today.  Ortho[facility]Sports Medicine Clinic  [address] [place] [address]    To schedule future appointments in Ortho Clinic, you may call [phone].    To schedule ordered imaging by your provider:   Call Central Imaging [phone]    To schedule an injection ordered by your provider:  Call Central Imaging Injection scheduling line: [phone]  [email or web address] available online at:  [email or web address]    Please call if any further questions or concerns ([phone]).  Clinic hours 8 am to 5 pm.    Return to clinic (call) if symptoms worsen or fail to improve.

## 2018-02-21 NOTE — PROGRESS NOTES
Rosendo Mosqueda,  The test results on thyroid functions, hemoglobin and blood count, liver and kidney functions, fasting cholesterol are all normal and within range.  Please continue with your efforts on healthy eating and regular exercises.   Let me know if you have any questions. Take care.  Parminder Taveras MD

## 2018-02-21 NOTE — NURSING NOTE
"Jaylin Day's goals for this visit include: est. Care for lumbar back pain.   She requests these members of her care team be copied on today's visit information: yes    PCP: Parminder Taveras    Referring Provider:  Parminder Taveras [place]  [address] [place] [address]    Chief Complaint   Patient presents with     Consult     bilateral low back pain without sciatica.pt states she had back surgery 09/2016.        Initial /78  Pulse 107  Ht 1.68 m (5' 6.14\")  Wt 67.6 kg (149 lb)  LMP 01/22/2018 (Exact Date)  SpO2 99%  BMI 23.95 kg/m2 Estimated body mass index is 23.95 kg/(m^2) as calculated from the following:    Height as of this encounter: 1.68 m (5' 6.14\").    Weight as of this encounter: 67.6 kg (149 lb).  Medication Reconciliation: complete    "

## 2018-02-28 ENCOUNTER — THERAPY VISIT (OUTPATIENT)
Dept: PHYSICAL THERAPY | Facility: CLINIC | Age: 38
End: 2018-02-28
Payer: COMMERCIAL

## 2018-02-28 DIAGNOSIS — M54.50 BILATERAL LOW BACK PAIN WITHOUT SCIATICA, UNSPECIFIED CHRONICITY: Primary | ICD-10-CM

## 2018-02-28 PROBLEM — M54.16 LUMBAR RADICULOPATHY: Status: ACTIVE | Noted: 2018-02-28

## 2018-02-28 PROCEDURE — 97161 PT EVAL LOW COMPLEX 20 MIN: CPT | Mod: GP | Performed by: PHYSICAL THERAPIST

## 2018-02-28 PROCEDURE — 97530 THERAPEUTIC ACTIVITIES: CPT | Mod: GP | Performed by: PHYSICAL THERAPIST

## 2018-02-28 PROCEDURE — 97110 THERAPEUTIC EXERCISES: CPT | Mod: GP | Performed by: PHYSICAL THERAPIST

## 2018-02-28 NOTE — PROGRESS NOTES
[facility] Initial Evaluation -- Lumbar    Date: February 28, 2018  Jaylin Day is a 37 year old female with a lumbar condition.   Referral: Dr. Lockett - orthopedics  Work mechanical stresses:    Employment status:  Working full time  Leisure mechanical stresses: would like to get back to hiking, running and working out (exercise)  Functional disability score (MIMI/STarT Back):  18%, low   VAS score (0-10): 2/10  Patient goals/expectations:  Alleviate symptoms.  Learn a safe way to return to activity without aggravating her lower back.     HISTORY:    Present symptoms: ache across the lower back with tingling down L LE into the great toe.  Pain quality (sharp/shooting/stabbing/aching/burning/cramping):  Ache lower back, tingling LE   Paresthesia (yes/no):  no    Present since (onset date): Patient notes symptoms began in 2015 as a dull ache for the L LE.  Symptoms progressively worsened and she had surgery in September 2016 (microdisectomy).  She was better after surgery, however, her symptoms never fully resolved.   Patient feels that she has been better for the past 6 months (best she has been since the symptoms began in 2015).  She would like to increase her activity level without injuring herself.     Symptoms (improving/unchanging/worsening):  improving.     Symptoms commenced as a result of: unknown   Condition occurred in the following environment:   ?     Symptoms at onset (back/thigh/leg): L LE  Constant symptoms (back/thigh/leg): low back  Intermittent symptoms (back/thigh/leg): L thigh, lower leg, foot    Symptoms are made worse with the following: Always Bending, Always Sitting, Sometimes Rising, Time of day - Always PM and Other - vacuuming, mopping - always   Symptoms are made better with the following: Always Walking and Sometimes On the move, sometime with standing    Disturbed sleep (yes/no):  Yes (3-4 times a week)   Sleeping postures (prone/sup/side R/L): either  side    Previous episodes (0/1-5/6-10/11+): 1 Year of first episode:     Previous history: no previous history of lower back pain or LE symptoms prior to the onset of the L LE in .   Previous treatments: did try 2 CARMEN prior to surgery.  Would have a week of relief, but then, no change.      Specific Questions:  Cough/Sneeze/Strain (pos/neg): negative  Bowel/Bladder (normal/abnormal): normal   Gait (normal/abnormal): normal  Medications (nil/NSAIDS/analg/steroids/anticoag/other):  NSAIDS and OTC analgesic if needed  Medical allergies:  Methocarbamol  General health (excellent/good/fair/poor):  good  Pertinent medical history:  None  Imaging (None/Xray/MRI/Other):  MRI prior to surgery; no imaging since  Recent or major surgery (yes/no):  Lumbar disectomy ?L4-5  Night pain (yes/no): wakes due to lower back but that is not when her symptoms are at their worst.  Accidents (yes/no): no  Unexplained weight loss (yes/no): no  Barriers at home: no  Other red flags: no    EXAMINATION    Posture:   Sitting (good/fair/poor): fair  Standing (good/fair/poor):good  Lordosis (red/acc/normal): normal to slightly reduced  Correction of posture (better/worse/no effect): better    Lateral Shift (right/left/nil): nil  Relevant (yes/no):  no  Other Observations: slight deviation to the R with EIS.    Neurological:    Motor deficit:  5/5 myotome strength for B LEs  Reflexes:  Brisk and symmetrical   Sensory deficit:  Symmetrical to light touch  Dural signs:  Negative seated SLR, B.    Movement Loss:   Giorgio Mod Min Nil Pain   Flexion   x  Pulling in B calves, worse on L   Extension x x   Increased L LBP W dev R   Side Gliding R    x Increased L LBP   Side Gliding L   x  Increased L LBP     Test Movements:   During: produces, abolishes, increases, decreases, no effect, centralizing, peripheralizing   After: better, worse, no better, no worse, no effect, centralized, peripheralized    Pretest symptoms standin/10 left > R low back  pain   Symptoms During Symptoms After ROM increased ROM decreased No Effect   FIS          Rep FIS          EIS          Rep EIS          Pretest symptoms lying: low back ache, 2/10    Symptoms During Symptoms After ROM increased ROM decreased No Effect   JANET          Rep JANET          EIL Increases  PDM    No Worse      x   Rep EIL Increases  PDM, ROM increased W reps    Better       X for FIS and LROM felt stiff    If required, pretest symptoms: 1-2/10 low back ache   Symptoms During Symptoms After ROM increased ROM decreased No Effect   SGIS - R          Rep SGIS - R          SGIS - L Increases  PDM    No Worse         Rep SGIS - L Decreases  Increased ROM    Better    x       Static Tests:  Sitting slouched:  NT  Sitting erect:  Support felt good; without increased L sided pain  Standing slouched NT  Standing erect:  NT  Lying prone in extension:  Decreased ache Long sitting:  NT    Other Tests: EIL with R SG - 10 reps - felt better than without shift; ROM felt about the same as after R SGIS.    Provisional Classification:  Derangement - Asymmetrical, unilateral, symptoms below knee    Principle of Management:  Education:  Discussed wanting to alleviate symptoms in the L LE and foot.  Did much better with a lateral procedure (L SGIS).  No worsening of symptoms with EIL W L SG.  When did not shift hips, did well with initial set, but second set caused a worsening of LROM, no change in symptoms.  Discussed we need to avoid activity that brings on tingling L LE symptoms and goal to centralize back pain (centralization/peripheralization).  Assess ROM before and after exercise with a goal of full and painfree LROM     Equipment provided:  Lumbar roll  Mechanical therapy (Y/N):  yes   Extension principle:  EIL with L SG 10 reps every 2 hours if reaches a plateau with L SGIS  Lateral Principle:  L SGIS 10 reps per 2 hours  Flexion principle:  no  Other:  no    ASSESSMENT/PLAN:    Patient is a 37 year old female with  lumbar complaints.    Patient has the following significant findings with corresponding treatment plan.                Diagnosis 1:  Low back pain with referral into L LE (derangement)    Pain -  manual therapy, self management, education, directional preference exercise and home program  Decreased ROM/flexibility - manual therapy, therapeutic exercise and home program  Decreased joint mobility - manual therapy, therapeutic exercise and home program  Impaired muscle performance - neuro re-education and home program  Decreased function - therapeutic activities and home program  Impaired posture - neuro re-education, therapeutic activities and home program    Therapy Evaluation Codes:   1) History comprised of:   Personal factors that impact the plan of care:      None.    Comorbidity factors that impact the plan of care are:      None.     Medications impacting care: None.  2) Examination of Body Systems comprised of:   Body structures and functions that impact the plan of care:      Lumbar spine.   Activity limitations that impact the plan of care are:      Bending, Sitting, Standing, Sleeping and lifting.  3) Clinical presentation characteristics are:   Stable/Uncomplicated.  4) Decision-Making    Low complexity using standardized patient assessment instrument and/or measureable assessment of functional outcome.  Cumulative Therapy Evaluation is: Low complexity.    Previous and current functional limitations:  (See Goal Flow Sheet for this information)    Short term and Long term goals: (See Goal Flow Sheet for this information)     Communication ability:  Patient appears to be able to clearly communicate and understand verbal and written communication and follow directions correctly.  Treatment Explanation - The following has been discussed with the patient:   RX ordered/plan of care  Anticipated outcomes  Possible risks and side effects  This patient would benefit from PT intervention to resume normal activities.    Rehab potential is excellent.  Patient had a decrease in pain, improvement in LROM with L SGIS and EIL W/ L SG.  (better response to the L SGIS)    Frequency:  1 X week, once daily  Duration:  for 6-8 visits  Discharge Plan:  Achieve all LTG.  Independent in home treatment program.  Reach maximal therapeutic benefit.    Please refer to the daily flowsheet for treatment today, total treatment time and time spent performing 1:1 timed codes.

## 2018-03-02 ENCOUNTER — OFFICE VISIT (OUTPATIENT)
Dept: DERMATOLOGY | Facility: CLINIC | Age: 38
End: 2018-03-02
Attending: FAMILY MEDICINE
Payer: COMMERCIAL

## 2018-03-02 ENCOUNTER — TELEPHONE (OUTPATIENT)
Dept: DERMATOLOGY | Facility: CLINIC | Age: 38
End: 2018-03-02

## 2018-03-02 DIAGNOSIS — L71.9 ROSACEA: ICD-10-CM

## 2018-03-02 DIAGNOSIS — L73.2 HIDRADENITIS SUPPURATIVA: ICD-10-CM

## 2018-03-02 DIAGNOSIS — L70.0 ACNE VULGARIS: Primary | ICD-10-CM

## 2018-03-02 PROCEDURE — 99202 OFFICE O/P NEW SF 15 MIN: CPT | Performed by: DERMATOLOGY

## 2018-03-02 RX ORDER — METRONIDAZOLE 10 MG/G
GEL TOPICAL DAILY
Qty: 60 G | Refills: 5 | Status: SHIPPED | OUTPATIENT
Start: 2018-03-02 | End: 2019-01-07

## 2018-03-02 NOTE — MR AVS SNAPSHOT
After Visit Summary   3/2/2018    Jaylin Day    MRN: [id number]           Patient Information     Date Of Birth          1980        Visit Information        Provider Department      3/2/2018 9:00 AM Ashley Torres MD [facility]        Today's Diagnoses     Acne vulgaris    -  1    Rosacea        Hidradenitis suppurativa          Care Instructions    Consider pulsed dye laser    2 medications on the market are Mirvaso and rhofade          Follow-ups after your visit        Follow-up notes from your care team     Return in about 8 months (around 11/2/2018) for schedule pulse PDL.      Your next 10 appointments already scheduled     Mar 07, 2018  3:30 PM CST   RODER[facility] Spine with Sylvia Mariano PTA   [facility] ([facility]  )    [address]  [facility] [address]   [phone]            Mar 14, 2018 11:00 AM CDT   RODERICK Spine with Sylvia Mariano PTA   [facility] ([facility]  )    [address]  [facility] [address] [phone]            Mar 21, 2018 11:00 AM CDT   RODERICK Spine with Tiesha Zambrano, PT   [facility] ([facility]  )    [address]  [facility] [address]   [phone]            Mar 28, 2018 11:40 AM CDT   RODERICK Spine with Tiesha Zambrano, PT   [facility] ([facility]  )    [address]  [facility] [address] [phone]            Apr 04, 2018 11:00 AM CDT   MEDSPINE RTN with Kaushik Lockett DO   [facility] ([facility])    [address]  [facility] [address] [phone]              Who to contact     If you have questions or need follow up information about today's clinic visit or your schedule please contact [facility] directly at [phone].  Normal or non-critical lab and imaging results  will be communicated to you by Crowdbaronhart, letter or phone within 4 business days after the clinic has received the results. If you do not hear from us within 7 days, please contact the clinic through miDrive or phone. If you have a critical or abnormal lab result, we will notify you by phone as soon as possible.  Submit refill requests through miDrive or call your pharmacy and they will forward the refill request to us. Please allow 3 business days for your refill to be completed.          Additional Information About Your Visit        miDrive Information     miDrive gives you secure access to your electronic health record. If you see a primary care provider, you can also send messages to your care team and make appointments. If you have questions, please call your primary care clinic.  If you do not have a primary care provider, please call [phone] and they will assist you.      miDrive is an electronic gateway that provides easy, online access to your medical records. With miDrive, you can request a clinic appointment, read your test results, renew a prescription or communicate with your care team.     To access your existing account, please contact your [facility] or call [phone] for assistance.        Care EveryWhere ID     This is your Care EveryWhere ID. This could be used by other organizations to access your [place] medical records  [id number]         Blood Pressure from Last 3 Encounters:   02/21/18 112/78   02/07/18 110/78   12/10/12 116/78    Weight from Last 3 Encounters:   02/21/18 67.6 kg (149 lb)   02/07/18 67.9 kg (149 lb 11.2 oz)   03/06/13 64.9 kg (143 lb)              Today, you had the following     No orders found for display         Today's Medication Changes          These changes are accurate as of 3/2/18  9:44 AM.  If you have any questions, ask your nurse or doctor.               Start taking these medicines.        Dose/Directions    metroNIDAZOLE  1 % gel   Commonly known as:  METROGEL   Used for:  Rosacea   Started by:  Ashley Torres MD        Apply topically daily Apply once daily   Quantity:  60 g   Refills:  5       oxymetazoline HCl 1 % cream   Commonly known as:  RHOFADE   Used for:  Rosacea   Started by:  Ashley Torres MD        Apply a pea-sized amount once daily to the entire face or affected area avoiding the eyes and lips. Wash hands immediately after applying.   Quantity:  30 g   Refills:  11            Where to get your medicines      These medications were sent to [id number] - [place], [place] - [address] AT [facility] [address], [facility] [address]     Phone:  [phone]     metroNIDAZOLE 1 % gel    oxymetazoline HCl 1 % cream                Primary Care Provider Office Phone # Fax #    Parminder Tvaeras [phone] [address]  [facility] [address]        Equal Access to Services     [facility] AH: Hadii aad ku hadasho Soomaali, waaxda luqadaha, qaybta kaalmada adeegyada, waxay santhoshin hayrafael barnes . So [facility] [phone].    ATENCIÓN: Si habla español, tiene a rosado disposición servicios gratuitos de asistencia lingüística. Ll[facility] [phone].    We comply with applicable federal civil rights laws and [place] laws. We do not discriminate on the basis of race, color, national origin, age, disability, sex, sexual orientation, or gender identity.            Thank you!     Thank you for choosing [facility]  for your care. Our goal is always to provide you with excellent care. Hearing back from our patients is one way we can continue to improve our services. Please take a few minutes to complete the written survey that you may receive in the mail after your visit with us. Thank you!             Your Updated Medication List - Protect others around you: Learn how to safely use, store and throw away your medicines at  www.disposemymeds.org.          This list is accurate as of 3/2/18  9:44 AM.  Always use your most recent med list.                   Brand Name Dispense Instructions for use Diagnosis    ibuprofen 200 MG tablet    ADVIL/MOTRIN     Take 200-600 mg by mouth every 4 hours as needed for mild pain or moderate pain        metroNIDAZOLE 1 % gel    METROGEL    60 g    Apply topically daily Apply once daily    Rosacea       MULTIVITAMIN TABS   OR      1 TABLET DAILY    Urinary tract infection, site not specified, UTI (urinary tract infection)       nitroFURantoin (macrocrystal-monohydrate) 100 MG capsule    MACROBID    90 capsule    Take one capsule as needed after intercourse    Recurrent UTI       oxymetazoline HCl 1 % cream    RHOFADE    30 g    Apply a pea-sized amount once daily to the entire face or affected area avoiding the eyes and lips. Wash hands immediately after applying.    Rosacea

## 2018-03-02 NOTE — TELEPHONE ENCOUNTER
I called and spoke with Milton at [employer]- No prior authorization is required for CPT 03155,52635,20180 PDL treatment - Patient has a $2,500 deductible once met will have 75/25 coverage until max out of pocket is met $7,150. Patient will have to pay out of pocket up to deductible then will pay 25% of all services until max o/p is met. There are no current accumulations on the plan [id number]

## 2018-03-02 NOTE — TELEPHONE ENCOUNTER
----- Message from Ashley Torres MD sent at 3/2/2018  9:38 AM CST -----  Regarding: letter rosacea  Re:Request for laser coverage    To Whom This May Concern,    We are writing to request coverage of pulsed dye laser. This patient has symptoms of including redness.     This type of rosacea (erythematotelangiectatic type) responds well to PDL. We would expect 1 treatments every 6 weeks for a maximum of 3-6 treatments.     The CPT Code Description utilized would be 70021: Unlisted procedure, skin, mucous membrane and subcutaneous tissue.  The pricing is between $150 to up to $450 per treatment.       Other treatments that can be considered include mirvaso or rhofade. WE have initiated rhofade. However, this is a monthly cost to you and the patient and will not change the course of the disease. With this subtype of rosacea, metronidazole topicals will not work, as it is not associated with acne or pustules.       We strive to provide our patient with outstanding care. Therefore, I request you please contact me at [phone] if you have any questions regarding coverage or our treatment rational.     Ashley Torres MD    Department of Dermatology  [facility]  Phone([facility]): [phone]  Phone([facility]): [phone]

## 2018-03-02 NOTE — NURSING NOTE
Dermatology Rooming Note    Jaylin Day's goals for this visit include:   Chief Complaint   Patient presents with     Derm Problem     Redness on left cheek - has gotten larger - has tried 'everything' to make the redness go away     Derm Problem     Acne - Calderon & Alvin       Is a scribe okay for this visit: YES    Are records needed for this visit(If yes, obtain release of information): NO     Vitals: There were no vitals taken for this visit.    Referring Provider:  Parminder Taveras MD  [address] [place] [address]    [facility]

## 2018-03-02 NOTE — LETTER
March 19, 2018      Jaylin Day  [address] [place] [address]              Dear Betina Mosqueda! The clinic has been unable to reach you by phone and would like to get in touch with you. We would like to discuss your medical care that has been requested.  Please give us a call at [phone]. If you reach the voice mail, please leave a message with your name, date of birth, daytime telephone number and a date and time that someone can contact you.   We look forward to speaking with you soon.    Sincerely,      Dermatology  [facility]  Phone: [phone]         Sincerely,      Ashley Torres MD

## 2018-03-02 NOTE — LETTER
3/2/2018         RE: Jaylin Day  [address] [place] [address]        Dear Colleague,    Thank you for referring your patient, Jaylin Day, to the [facility]. Please see a copy of my visit note below.    [facility] Dermatology Note      Dermatology Problem List:  1.new patient  2. Acne rosacea     Encounter Date: Mar 2, 2018    CC:  Chief Complaint   Patient presents with     Derm Problem     Redness on left cheek - has gotten larger - has tried 'everything' to make the redness go away     Derm Problem     Acne - Calderon & Fields         History of Present Illness:  Ms. Jaylin Day is a 37 year old female who presents for acne evaluation. She is referral from Dr. Taveras from 2/7/2018. The patient reports that she has redness on the left cheek that has increased in size, she has tried everything to make this redness resolve. The patient reports that she is having a lot of acne as if she is a teenager. She has 4 day long regular periods. The patient also reports that she has been using rodan and fields.     Denies pain with flushing. The patient reports a gritty sensation in the eyes on occasion.     She is not having children right now, nor does she plan to in the future.     Past Medical History:   Patient Active Problem List   Diagnosis     Recurrent UTI     CARDIOVASCULAR SCREENING; LDL GOAL LESS THAN 160     Disorder of bursae and tendons in shoulder region     DUB (dysfunctional uterine bleeding)     S/P lumbar spine operation     Bilateral low back pain without sciatica, unspecified chronicity     History of lumbar surgery     Family history of thyroid disorder     Past Medical History:   Diagnosis Date     Female pelvic pain 2008    possible mild bicornuate or septate uterus     Migraines 2004    resolved      Preeclampsia      Recurrent UTI      Past Surgical History:   Procedure Laterality Date     DECOMPRESSION, FUSION LUMBAR POSTERIOR  THREE + LEVELS, COMBINED         Social History:  The patient is a .     Family History:  The patient has no famiyl history of skin cancer.     Medications:  Current Outpatient Prescriptions   Medication Sig Dispense Refill     nitroFURantoin, macrocrystal-monohydrate, (MACROBID) 100 MG capsule Take one capsule as needed after intercourse 90 capsule 1     ibuprofen (ADVIL,MOTRIN) 200 MG tablet Take 200-600 mg by mouth every 4 hours as needed for mild pain or moderate pain        MULTIVITAMIN TABS   OR 1 TABLET DAILY         Allergies   Allergen Reactions     Methocarbamol Hives       Review of Systems:  -Constitutional: The patient is feeling generally well.   -Skin: As above in HPI. No additional skin concerns.    Physical exam:  Vitals: There were no vitals taken for this visit.  GEN: This is a well developed, well-nourished male in no acute distress, in a pleasant mood.    SKIN: Acne exam, which includes the face, neck, upper central chest, and upper central back was performed.   -Chest and back are clear.   -Telangiectasias on the cheeks and nose, primarily on the left cheek.   -No other lesions of concern on areas examined.       Impression/Plan:  1. Rosacea-ET type, no acne today though patient report it. Will start topicals     Sun precaution was advised including the use of sun screens of SPF 30 or higher, sun protective clothing, and avoidance of tanning beds  Discussed PDL treatment. Discussed need for multiple treatments, risks and cost. Discussed to expect between 3-6 treatments spaced 4 weeks apart and then approximately every year maintenance therapy. Discussed that this considered a cosmetic procedure and is generally an out of pocket expense. Discussed taht the patient cannot be tan prior to treatment.   Discussed mirvaso as a daily treatment. Discussed risks, cost, and need for daily use.  Discussed risks of irritation and rebound with discontinuation of use.   Discussed rhofade as daily  treatment. Discussed risks, cost, and need for daily use.  Discussed risks of irritation and rebound with discontinuation of use. She would to go ahead with use.     Discussed possibility of Ocular rosacea and Symptoms of this condition which include, eyes that feel dry, irritated, or gritty, burning. Rec that she see eye doctor and discuss her symptoms and rosacea.     For acne prevention, Start metronidazole gel once daily, morning or night       Follow-up in 3 months, earlier for new or changing lesions.       Staff Involved:  Scribe/Staff      Scribe Disclosure:   I, Kerry Mcpherson, am serving as a scribe to document services personally performed by Dr. Ashley Torres, based on data collection and the provider's statements to me.       Provider Disclosure:   The documentation recorded by the scribe accurately reflects the services I personally performed and the decisions made by me.    Ashley Torres MD    Department of Dermatology  [facility]: Phone: [phone], Fax:[phone]  [facility]: Phone: [phone], Fax: [phone]        Again, thank you for allowing me to participate in the care of your patient.        Sincerely,        Ashley Torres MD

## 2018-03-02 NOTE — PROGRESS NOTES
[facility] Dermatology Note      Dermatology Problem List:  1.new patient  2. Acne rosacea     Encounter Date: Mar 2, 2018    CC:  Chief Complaint   Patient presents with     Derm Problem     Redness on left cheek - has gotten larger - has tried 'everything' to make the redness go away     Derm Problem     Acne - [employer]         History of Present Illness:  Ms. Jaylin Day is a 37 year old female who presents for acne evaluation. She is referral from Dr. Taveras from 2/7/2018. The patient reports that she has redness on the left cheek that has increased in size, she has tried everything to make this redness resolve. The patient reports that she is having a lot of acne as if she is a teenager. She has 4 day long regular periods. The patient also reports that she has been using rodan and fields.     Denies pain with flushing. The patient reports a gritty sensation in the eyes on occasion.     She is not having children right now, nor does she plan to in the future.     Past Medical History:   Patient Active Problem List   Diagnosis     Recurrent UTI     CARDIOVASCULAR SCREENING; LDL GOAL LESS THAN 160     Disorder of bursae and tendons in shoulder region     DUB (dysfunctional uterine bleeding)     S/P lumbar spine operation     Bilateral low back pain without sciatica, unspecified chronicity     History of lumbar surgery     Family history of thyroid disorder     Past Medical History:   Diagnosis Date     Female pelvic pain 2008    possible mild bicornuate or septate uterus     Migraines 2004    resolved      Preeclampsia      Recurrent UTI      Past Surgical History:   Procedure Laterality Date     DECOMPRESSION, FUSION LUMBAR POSTERIOR THREE + LEVELS, COMBINED         Social History:  The patient is a .     Family History:  The patient has no famiyl history of skin cancer.     Medications:  Current Outpatient Prescriptions   Medication Sig Dispense Refill     nitroFURantoin,  macrocrystal-monohydrate, (MACROBID) 100 MG capsule Take one capsule as needed after intercourse 90 capsule 1     ibuprofen (ADVIL,MOTRIN) 200 MG tablet Take 200-600 mg by mouth every 4 hours as needed for mild pain or moderate pain        MULTIVITAMIN TABS   OR 1 TABLET DAILY         Allergies   Allergen Reactions     Methocarbamol Hives       Review of Systems:  -Constitutional: The patient is feeling generally well.   -Skin: As above in HPI. No additional skin concerns.    Physical exam:  Vitals: There were no vitals taken for this visit.  GEN: This is a well developed, well-nourished male in no acute distress, in a pleasant mood.    SKIN: Acne exam, which includes the face, neck, upper central chest, and upper central back was performed.   -Chest and back are clear.   -Telangiectasias on the cheeks and nose, primarily on the left cheek.   -No other lesions of concern on areas examined.       Impression/Plan:  1. Rosacea-ET type, no acne today though patient report it. Will start topicals     Sun precaution was advised including the use of sun screens of SPF 30 or higher, sun protective clothing, and avoidance of tanning beds  Discussed PDL treatment. Discussed need for multiple treatments, risks and cost. Discussed to expect between 3-6 treatments spaced 4 weeks apart and then approximately every year maintenance therapy. Discussed that this considered a cosmetic procedure and is generally an out of pocket expense. Discussed taht the patient cannot be tan prior to treatment.   Discussed mirvaso as a daily treatment. Discussed risks, cost, and need for daily use.  Discussed risks of irritation and rebound with discontinuation of use.   Discussed rhofade as daily treatment. Discussed risks, cost, and need for daily use.  Discussed risks of irritation and rebound with discontinuation of use. She would to go ahead with use.     Discussed possibility of Ocular rosacea and Symptoms of this condition which include,  eyes that feel dry, irritated, or gritty, burning. Rec that she see eye doctor and discuss her symptoms and rosacea.     For acne prevention, Start metronidazole gel once daily, morning or night       Follow-up in 3 months, earlier for new or changing lesions.       Staff Involved:  Scribe/Staff      Scribe Disclosure:   I, Kerry Ky, am serving as a scribe to document services personally performed by Dr. Ashley Torres, based on data collection and the provider's statements to me.       Provider Disclosure:   The documentation recorded by the scribe accurately reflects the services I personally performed and the decisions made by me.    Ashley Torres MD    Department of Dermatology  [facility]: Phone: [phone], Fax:[phone]  [facility]: Phone: [phone], Fax: [phone]

## 2018-03-07 ENCOUNTER — THERAPY VISIT (OUTPATIENT)
Dept: PHYSICAL THERAPY | Facility: CLINIC | Age: 38
End: 2018-03-07
Payer: COMMERCIAL

## 2018-03-07 DIAGNOSIS — M54.50 LOW BACK PAIN: Primary | ICD-10-CM

## 2018-03-07 PROCEDURE — 97112 NEUROMUSCULAR REEDUCATION: CPT | Mod: GP | Performed by: PHYSICAL THERAPY ASSISTANT

## 2018-03-07 PROCEDURE — 97110 THERAPEUTIC EXERCISES: CPT | Mod: GP | Performed by: PHYSICAL THERAPY ASSISTANT

## 2018-03-14 ENCOUNTER — THERAPY VISIT (OUTPATIENT)
Dept: PHYSICAL THERAPY | Facility: CLINIC | Age: 38
End: 2018-03-14
Payer: COMMERCIAL

## 2018-03-14 DIAGNOSIS — M54.50 LOW BACK PAIN: ICD-10-CM

## 2018-03-14 PROCEDURE — 97110 THERAPEUTIC EXERCISES: CPT | Mod: GP | Performed by: PHYSICAL THERAPY ASSISTANT

## 2018-03-14 PROCEDURE — 97112 NEUROMUSCULAR REEDUCATION: CPT | Mod: GP | Performed by: PHYSICAL THERAPY ASSISTANT

## 2018-03-19 NOTE — TELEPHONE ENCOUNTER
Left message for patient to call [phone].  Will send letter due to no response from the patient.    Mary Jernigan, CMA

## 2018-03-20 ENCOUNTER — TELEPHONE (OUTPATIENT)
Dept: DERMATOLOGY | Facility: CLINIC | Age: 38
End: 2018-03-20

## 2018-03-20 DIAGNOSIS — L71.9 ROSACEA: Primary | ICD-10-CM

## 2018-03-20 NOTE — TELEPHONE ENCOUNTER
Prior auth started for Rhofade and Metrogel.  Patient notified.  See 3/20/18 telephone encounter.  Adriana Rodriguez RN

## 2018-03-20 NOTE — TELEPHONE ENCOUNTER
I confirmed with Thomas that prior authorization is needed for Rhofade and Metrogel.  Submitted to PA team.  Patient notified.    Adriana Rodriguez RN

## 2018-03-21 ENCOUNTER — THERAPY VISIT (OUTPATIENT)
Dept: PHYSICAL THERAPY | Facility: CLINIC | Age: 38
End: 2018-03-21
Payer: COMMERCIAL

## 2018-03-21 DIAGNOSIS — M54.50 LOW BACK PAIN: ICD-10-CM

## 2018-03-21 PROCEDURE — 97110 THERAPEUTIC EXERCISES: CPT | Mod: GP | Performed by: PHYSICAL THERAPIST

## 2018-03-21 PROCEDURE — 97112 NEUROMUSCULAR REEDUCATION: CPT | Mod: GP | Performed by: PHYSICAL THERAPIST

## 2018-03-21 NOTE — TELEPHONE ENCOUNTER
Central Prior Authorization Team   Phone: [phone]    PA Initiation    Medication: oxymetazoline HCl (RHOFADE) 1 % cream  Insurance Company: [employer] - Phone [phone] Fax [phone]  Pharmacy Filling the Rx: [facility] [place], [place] - [address] AT [facility] & [facility]  Filling Pharmacy Phone: [phone]  Filling Pharmacy Fax: [phone]  Start Date: 3/21/2018

## 2018-03-28 ENCOUNTER — THERAPY VISIT (OUTPATIENT)
Dept: PHYSICAL THERAPY | Facility: CLINIC | Age: 38
End: 2018-03-28
Payer: COMMERCIAL

## 2018-03-28 DIAGNOSIS — M54.50 LOW BACK PAIN: ICD-10-CM

## 2018-03-28 PROCEDURE — 97110 THERAPEUTIC EXERCISES: CPT | Mod: GP | Performed by: PHYSICAL THERAPIST

## 2018-04-03 RX ORDER — BRIMONIDINE 5 MG/G
GEL TOPICAL
Qty: 30 G | Refills: 11 | Status: CANCELLED | OUTPATIENT
Start: 2018-04-03

## 2018-04-03 NOTE — TELEPHONE ENCOUNTER
PRIOR AUTHORIZATION DENIED    Medication: oxymetazoline HCl (RHOFADE) 1 % cream - denied    Denial Date: 3/29/2018    Denial Rational: script is denied because pt must try/fail Mirvaso              Appeal Information:

## 2018-04-03 NOTE — TELEPHONE ENCOUNTER
I left a message for patient to call [facility].  Mirvaso orlando'd up.  Please review risks with patient before sending prescription.  Adriana Rodriguez RN

## 2018-04-10 NOTE — TELEPHONE ENCOUNTER
I left a message for patient to call [facility].  Three attempts to reach patient.  Closing encounter.  Pharmacy notified.  Adriana Rodriguez RN

## 2018-04-11 ENCOUNTER — THERAPY VISIT (OUTPATIENT)
Dept: PHYSICAL THERAPY | Facility: CLINIC | Age: 38
End: 2018-04-11
Payer: COMMERCIAL

## 2018-04-11 DIAGNOSIS — M54.50 LOW BACK PAIN: ICD-10-CM

## 2018-04-11 PROCEDURE — 97110 THERAPEUTIC EXERCISES: CPT | Mod: GP | Performed by: PHYSICAL THERAPY ASSISTANT

## 2018-04-18 ENCOUNTER — THERAPY VISIT (OUTPATIENT)
Dept: PHYSICAL THERAPY | Facility: CLINIC | Age: 38
End: 2018-04-18
Payer: COMMERCIAL

## 2018-04-18 DIAGNOSIS — M54.50 LOW BACK PAIN: ICD-10-CM

## 2018-04-18 PROCEDURE — 97110 THERAPEUTIC EXERCISES: CPT | Mod: GP | Performed by: PHYSICAL THERAPY ASSISTANT

## 2018-05-02 ENCOUNTER — THERAPY VISIT (OUTPATIENT)
Dept: PHYSICAL THERAPY | Facility: CLINIC | Age: 38
End: 2018-05-02
Payer: COMMERCIAL

## 2018-05-02 ENCOUNTER — OFFICE VISIT (OUTPATIENT)
Dept: ORTHOPEDICS | Facility: CLINIC | Age: 38
End: 2018-05-02
Payer: COMMERCIAL

## 2018-05-02 VITALS — SYSTOLIC BLOOD PRESSURE: 123 MMHG | DIASTOLIC BLOOD PRESSURE: 83 MMHG | OXYGEN SATURATION: 99 % | HEART RATE: 80 BPM

## 2018-05-02 DIAGNOSIS — M54.50 LOW BACK PAIN: ICD-10-CM

## 2018-05-02 DIAGNOSIS — M54.16 LUMBAR RADICULOPATHY: Primary | ICD-10-CM

## 2018-05-02 PROCEDURE — 97110 THERAPEUTIC EXERCISES: CPT | Mod: GP | Performed by: PHYSICAL THERAPY ASSISTANT

## 2018-05-02 PROCEDURE — 99213 OFFICE O/P EST LOW 20 MIN: CPT | Performed by: FAMILY MEDICINE

## 2018-05-02 ASSESSMENT — PAIN SCALES - GENERAL: PAINLEVEL: MILD PAIN (2)

## 2018-05-02 NOTE — NURSING NOTE
"Jaylin Day's goals for this visit include: lumbar back follow up   She requests these members of her care team be copied on today's visit information: yes    PCP: Parminder Taveras    Referring Provider:  No referring provider defined for this encounter.    Chief Complaint   Patient presents with     RECHECK     lumbar back pain follow up        Initial /83  Pulse 80  SpO2 99% Estimated body mass index is 23.95 kg/(m^2) as calculated from the following:    Height as of 2/21/18: 1.68 m (5' 6.14\").    Weight as of 2/21/18: 67.6 kg (149 lb).  Medication Reconciliation: complete    "

## 2018-05-02 NOTE — PROGRESS NOTES
HISTORY OF PRESENT ILLNESS  Ms. Day is a pleasant 37 year old year old female following up with lumbar radiculopathy.  Jaylin has been in physical therapy for the past few months.  I spoke with her physical therapist, Sylvia, just before the visit.  Sylvia endorses that Jaylin has been doing quite well in therapy.  Her strength and functionality have improved.  Back bending is still an issue.  Jaylin reports that she is feeling great.  She agrees the back bending insulin issue for her, she does get some left low back pain that radiates slightly into her hip but this is tolerable.  She would like to begin the transition back to running, if able.  Additional history: as documented      REVIEW OF SYSTEMS (5/2/2018)  10 point ROS of systems including Constitutional, Eyes, Respiratory, Cardiovascular, Gastroenterology, Genitourinary, Integumentary, Musculoskeletal, Psychiatric were all negative except for pertinent positives noted in my HPI.     PHYSICAL EXAM  Vitals:    05/02/18 1101   BP: 123/83   Pulse: 80   SpO2: 99%     General  - normal appearance, in no obvious distress  CV  - normal peripheral perfusion  Pulm  - normal respiratory pattern, non-labored  Musculoskeletal - lumbar spine  - stance: normal gait  - inspection: normal bone and joint alignment, no obvious scoliosis  - palpation: no tenderness  - ROM: no pain with bilateral rotation, flexion past 30 deg, extension  - strength: lower extremities 5/5 in all planes  - special tests:  (-) slump test  Neuro  - patellar and Achilles DTRs 2+ bilaterally, no sensory or motor deficit, grossly normal coordination, normal muscle tone  Skin  - no ecchymosis, erythema, warmth, or induration, no obvious rash  Psych  - interactive, appropriate, normal mood and affect        ASSESSMENT & PLAN  Ms. Day is a 37 year old year old female following up with lumbar radiculopathy.    Jaylin has been progressing quite well in therapy.  I do think it is reasonable to begin a  slow, methodical return to running.  We did discuss that while running is a relatively risky activity, for her the benefits may outweigh the risk.    If Jaylin experiences a return of pain she knows to back off and avoid exacerbating activity.  If she does experience a return of pain I would likely order an MR.    Otherwise Jaylin can follow up as needed.    It was a pleasure seeing Jaylin.        Kaushik Lockett, DO, CAM

## 2018-05-02 NOTE — MR AVS SNAPSHOT
After Visit Summary   2018    Jaylin Day    MRN: [id number]           Patient Information     Date Of Birth          1980        Visit Information        Provider Department      2018 11:00 AM Kaushik Lockett,  [facility]        Today's Diagnoses     Lumbar radiculopathy    -  1      Care Instructions    Thanks for coming today.  Ortho/Sports Medicine Clinic  [address] [place] [address]    To schedule future appointments in Ortho Clinic, you may call [phone].    To schedule ordered imaging by your provider:   Call Central Imaging [phone]    To schedule an injection ordered by your provider:  Call Central Imaging Injection scheduling line: [phone]  [facility] available online at:  [email or web address]    Please call if any further questions or concerns ([phone]).  Clinic hours 8 am to 5 pm.    Return to clinic (call) if symptoms worsen or fail to improve.            Follow-ups after your visit        Your next 10 appointments already scheduled     May 16, 2018 11:40 AM LETY   [facility] Spine with Sylvia Mariano PTA   [facility] ([facility]  )    [address] [facility] [address] [phone]              Who to contact     If you have questions or need follow up information about today's clinic visit or your schedule please contact [facility] directly at [phone].  Normal or non-critical lab and imaging results will be communicated to you by MyChart, letter or phone within 4 business days after the clinic has received the results. If you do not hear from us within 7 days, please contact the clinic through [employer] or phone. If you have a critical or abnormal lab result, we will notify you by phone as soon as possible.  Submit refill requests through [employer] or call your pharmacy and they will forward the refill request to us. Please allow 3 business  days for your refill to be completed.          Additional Information About Your Visit        [employer] Information     [employer] gives you secure access to your electronic health record. If you see a primary care provider, you can also send messages to your care team and make appointments. If you have questions, please call your primary care clinic.  If you do not have a primary care provider, please call [phone] and they will assist you.      [employer] is an electronic gateway that provides easy, online access to your medical records. With [employer], you can request a clinic appointment, read your test results, renew a prescription or communicate with your care team.     To access your existing account, please contact your [facility] or call [phone] for assistance.        Care EveryWhere ID     This is your Care EveryWhere ID. This could be used by other organizations to access your [facility] medical records  [id number]        Your Vitals Were     Pulse Pulse Oximetry                80 99%           Blood Pressure from Last 3 Encounters:   05/02/18 123/83   02/21/18 112/78   02/07/18 110/78    Weight from Last 3 Encounters:   02/21/18 67.6 kg (149 lb)   02/07/18 67.9 kg (149 lb 11.2 oz)   03/06/13 64.9 kg (143 lb)              Today, you had the following     No orders found for display       Primary Care Provider Office Phone # Fax #    Parminder Taveras [phone]       [address]  [facility] [address]        Equal Access to Services     CICI BURKS : Hadii zaina ku hadasho Soomaali, waaxda luqadaha, qaybta kaalmada adeegyada, twan barnes . So [facility] [phone].    ATENCIÓN: Si habla español, tiene a rosado disposición servicios gratuitos de asistencia lingüística. Llame al [phone].    We comply with applicable federal civil rights laws and [place] laws. We do not discriminate on the basis of race, color, national origin,  age, disability, sex, sexual orientation, or gender identity.            Thank you!     Thank you for choosing [facility]  for your care. Our goal is always to provide you with excellent care. Hearing back from our patients is one way we can continue to improve our services. Please take a few minutes to complete the written survey that you may receive in the mail after your visit with us. Thank you!             Your Updated Medication List - Protect others around you: Learn how to safely use, store and throw away your medicines at www.disposemymeds.org.          This list is accurate as of 5/2/18 11:29 AM.  Always use your most recent med list.                   Brand Name Dispense Instructions for use Diagnosis    ibuprofen 200 MG tablet    ADVIL/MOTRIN     Take 200-600 mg by mouth every 4 hours as needed for mild pain or moderate pain        metroNIDAZOLE 1 % gel    METROGEL    60 g    Apply topically daily Apply once daily    Rosacea       MULTIVITAMIN TABS   OR      1 TABLET DAILY    Urinary tract infection, site not specified, UTI (urinary tract infection)       nitroFURantoin (macrocrystal-monohydrate) 100 MG capsule    MACROBID    90 capsule    Take one capsule as needed after intercourse    Recurrent UTI

## 2018-05-02 NOTE — PATIENT INSTRUCTIONS
Thanks for coming today.  Ortho/Sports Medicine Clinic  [address] [place] [address]    To schedule future appointments in Ortho Clinic, you may call [phone].    To schedule ordered imaging by your provider:   Call Central Imaging [phone]    To schedule an injection ordered by your provider:  Call Central Imaging Injection scheduling line: [phone]  [employer] available online at:  [email or web address]    Please call if any further questions or concerns ([phone]).  Clinic hours 8 am to 5 pm.    Return to clinic (call) if symptoms worsen or fail to improve.

## 2018-05-02 NOTE — PROGRESS NOTES
Subjective:  HPI  Oswestry Score: 12 %                 Objective:  System    Physical Exam    General     ROS    Assessment/Plan:    PROGRESS  REPORT    Progress reporting period is from 2-28-18 to 5-2-18.       SUBJECTIVE  Subjective changes noted by patient: Pt reports feeling she pulled a muscle after last session and had to stop her HEP for a few days but has been able to resume without difficulty overall. Does note she has taken out a few exercises due to soreness. Since starting PT has noticed progress in regards to strength in core and upper body, no longer having pain in L lower leg-pain is now centralized at L hip/low back. Biggest worry is that she is fearful that pain on L side will always be there. Also wondering if she will ever be able to run/jog again.     Current Pain level: 1/10.      Initial Pain level:  (will take Ibuprofen when pain is a 4/10 so doesn't worsen).   Changes in function:  Yes (See Goal flowsheet attached for changes in current functional level)  Adverse reaction to treatment or activity: 2 weeks ago had a muscle pull after PT-unsure if from exercise or from something she did at home; took a few days off of HEP and was able to resume without difficulty (but did hold on that particular exercise)    OBJECTIVE    Changes noted in objective findings:  Yes, Improved flexion and sideglides. Continues to have limited extension ROM that does not change mechanically but does improve after strengthening exercises. Oswestry has improved from 18% to 12%.     Today's Objective: Current sym: L hip laterally; LROM: FIS: min loss; SGIS: symmetrical NE; EIS mod loss with ERP @ L LB     ASSESSMENT/PLAN  Updated problem list and treatment plan: Diagnosis 1:  L LBP with radicular symptoms  Pain -  self management, education and home program  Decreased ROM/flexibility - therapeutic exercise and home program  Decreased strength - therapeutic exercise, therapeutic activities and home program  Decreased  function - therapeutic activities and home program  STG/LTGs have been met or progress has been made towards goals:  Yes (See Goal flow sheet completed today.)  Assessment of Progress: The patient's condition is improving.  The patient's condition has potential to improve.  Patient is meeting short term goals and is progressing towards long term goals.  Self Management Plans:  Patient has been instructed in a home treatment program.  Patient  has been instructed in self management of symptoms.  I have discussed this patient with primary PT and find that the nature, scope, duration and intensity of the therapy is appropriate for the medical condition of the patient.  Jaylin continues to require the following intervention to meet STG and LTG's:  PT    Recommendations:  This patient would benefit from further evaluation.  Please advice about walk/run program and when okay to begin.     The progress note/discharge summary was written in collaboration with and reviewed by the physical therapist.    Please refer to the daily flowsheet for treatment today, total treatment time and time spent performing 1:1 timed codes.          2

## 2018-05-02 NOTE — LETTER
5/2/2018         RE: Jaylin Day  [address] [facility] [address]        Dear Colleague,    Thank you for referring your patient, Jaylin Day, to the [facility]. Please see a copy of my visit note below.    HISTORY OF PRESENT ILLNESS  Ms. Day is a pleasant 37 year old year old female following up with lumbar radiculopathy.  Jaylin has been in physical therapy for the past few months.  I spoke with her physical therapist, Sylvia, just before the visit.  Sylvia endorses that Jaylin has been doing quite well in therapy.  Her strength and functionality have improved.  Back bending is still an issue.  Jaylin reports that she is feeling great.  She agrees the back bending insulin issue for her, she does get some left low back pain that radiates slightly into her hip but this is tolerable.  She would like to begin the transition back to running, if able.  Additional history: as documented      REVIEW OF SYSTEMS (5/2/2018)  10 point ROS of systems including Constitutional, Eyes, Respiratory, Cardiovascular, Gastroenterology, Genitourinary, Integumentary, Musculoskeletal, Psychiatric were all negative except for pertinent positives noted in my HPI.     PHYSICAL EXAM  Vitals:    05/02/18 1101   BP: 123/83   Pulse: 80   SpO2: 99%     General  - normal appearance, in no obvious distress  CV  - normal peripheral perfusion  Pulm  - normal respiratory pattern, non-labored  Musculoskeletal - lumbar spine  - stance: normal gait  - inspection: normal bone and joint alignment, no obvious scoliosis  - palpation: no tenderness  - ROM: no pain with bilateral rotation, flexion past 30 deg, extension  - strength: lower extremities 5/5 in all planes  - special tests:  (-) slump test  Neuro  - patellar and Achilles DTRs 2+ bilaterally, no sensory or motor deficit, grossly normal coordination, normal muscle tone  Skin  - no ecchymosis, erythema, warmth, or induration, no obvious rash  Psych  -  interactive, appropriate, normal mood and affect        ASSESSMENT & PLAN  Ms. Day is a 37 year old year old female following up with lumbar radiculopathy.    Jaylin has been progressing quite well in therapy.  I do think it is reasonable to begin a slow, methodical return to running.  We did discuss that while running is a relatively risky activity, for her the benefits may outweigh the risk.    If Jaylin experiences a return of pain she knows to back off and avoid exacerbating activity.  If she does experience a return of pain I would likely order an MR.    Otherwise Jaylin can follow up as needed.    It was a pleasure seeing Jaylin.        Kaushik Lockett DO, CAM          Again, thank you for allowing me to participate in the care of your patient.        Sincerely,        Kaushik Lockett DO

## 2018-05-02 NOTE — MR AVS SNAPSHOT
After Visit Summary   5/2/2018    Jaylin Day    MRN: [id number]           Patient Information     Date Of Birth          1980        Visit Information        Provider Department      5/2/2018 10:20 AM Sylvia Mariano PTA [facility]        Today's Diagnoses     Low back pain           Follow-ups after your visit        Your next 10 appointments already scheduled     May 16, 2018 11:40 AM CDT   [facility] with Sylvia Mariano PTA   [facility] ([facility]  )    [address]  [facility] [address] [phone]              Who to contact     If you have questions or need follow up information about today's clinic visit or your schedule please contact [facility] directly at [phone].  Normal or non-critical lab and imaging results will be communicated to you by [employer], letter or phone within 4 business days after the clinic has received the results. If you do not hear from us within 7 days, please contact the clinic through [employer] or phone. If you have a critical or abnormal lab result, we will notify you by phone as soon as possible.  Submit refill requests through [employer] or call your pharmacy and they will forward the refill request to us. Please allow 3 business days for your refill to be completed.          Additional Information About Your Visit        MyChart Information     [employer] gives you secure access to your electronic health record. If you see a primary care provider, you can also send messages to your care team and make appointments. If you have questions, please call your primary care clinic.  If you do not have a primary care provider, please call [phone] and they will assist you.        Care EveryWhere ID     This is your Care EveryWhere ID. This could be used by other organizations to access your [place] medical records  [id number]         Blood  Pressure from Last 3 Encounters:   05/02/18 123/83   02/21/18 112/78   02/07/18 110/78    Weight from Last 3 Encounters:   02/21/18 67.6 kg (149 lb)   02/07/18 67.9 kg (149 lb 11.2 oz)   03/06/13 64.9 kg (143 lb)              We Performed the Following     [facility] PROGRESS NOTES REPORT     THERAPEUTIC EXERCISES        Primary Care Provider Office Phone # Fax #    Parminder Taveras [phone] [address]  [facility] [address]        Equal Access to Services     [facility]: Hadii aad ku hadasho Soomaali, waaxda luqadaha, qaybta kaalmada adeflaquitayada, twan barnes . So [place] [phone].    ATENCIÓN: Si habla español, tiene a rosado disposición servicios gratuitos de asistencia lingüística. Llame al [phone].    We comply with applicable federal civil rights laws and [place] laws. We do not discriminate on the basis of race, color, national origin, age, disability, sex, sexual orientation, or gender identity.            Thank you!     Thank you for choosing [facility]  for your care. Our goal is always to provide you with excellent care. Hearing back from our patients is one way we can continue to improve our services. Please take a few minutes to complete the written survey that you may receive in the mail after your visit with us. Thank you!             Your Updated Medication List - Protect others around you: Learn how to safely use, store and throw away your medicines at www.disposemymeds.org.          This list is accurate as of 5/2/18 11:08 AM.  Always use your most recent med list.                   Brand Name Dispense Instructions for use Diagnosis    ibuprofen 200 MG tablet    ADVIL/MOTRIN     Take 200-600 mg by mouth every 4 hours as needed for mild pain or moderate pain        metroNIDAZOLE 1 % gel    METROGEL    60 g    Apply topically daily Apply once daily    Rosacea       MULTIVITAMIN TABS   OR      1 TABLET  DAILY    Urinary tract infection, site not specified, UTI (urinary tract infection)       nitroFURantoin (macrocrystal-monohydrate) 100 MG capsule    MACROBID    90 capsule    Take one capsule as needed after intercourse    Recurrent UTI

## 2018-06-24 ENCOUNTER — OFFICE VISIT (OUTPATIENT)
Dept: URGENT CARE | Facility: URGENT CARE | Age: 38
End: 2018-06-24
Payer: COMMERCIAL

## 2018-06-24 VITALS
HEART RATE: 79 BPM | DIASTOLIC BLOOD PRESSURE: 84 MMHG | BODY MASS INDEX: 25.23 KG/M2 | SYSTOLIC BLOOD PRESSURE: 136 MMHG | WEIGHT: 157 LBS | TEMPERATURE: 97.6 F | OXYGEN SATURATION: 99 % | RESPIRATION RATE: 18 BRPM

## 2018-06-24 DIAGNOSIS — R42 DIZZINESS: Primary | ICD-10-CM

## 2018-06-24 DIAGNOSIS — F41.1 ANXIETY REACTION: ICD-10-CM

## 2018-06-24 PROCEDURE — 99213 OFFICE O/P EST LOW 20 MIN: CPT | Performed by: FAMILY MEDICINE

## 2018-06-24 RX ORDER — MECLIZINE HYDROCHLORIDE 25 MG/1
25 TABLET ORAL EVERY 6 HOURS PRN
Qty: 30 TABLET | Refills: 0 | Status: SHIPPED | OUTPATIENT
Start: 2018-06-24 | End: 2019-01-07

## 2018-06-24 ASSESSMENT — ANXIETY QUESTIONNAIRES
GAD7 TOTAL SCORE: 4
5. BEING SO RESTLESS THAT IT IS HARD TO SIT STILL: NOT AT ALL
1. FEELING NERVOUS, ANXIOUS, OR ON EDGE: SEVERAL DAYS
2. NOT BEING ABLE TO STOP OR CONTROL WORRYING: SEVERAL DAYS
7. FEELING AFRAID AS IF SOMETHING AWFUL MIGHT HAPPEN: NOT AT ALL
3. WORRYING TOO MUCH ABOUT DIFFERENT THINGS: NOT AT ALL
6. BECOMING EASILY ANNOYED OR IRRITABLE: SEVERAL DAYS

## 2018-06-24 ASSESSMENT — PATIENT HEALTH QUESTIONNAIRE - PHQ9: 5. POOR APPETITE OR OVEREATING: SEVERAL DAYS

## 2018-06-24 NOTE — PATIENT INSTRUCTIONS
Anxiety Reaction  Anxiety is the feeling we all get when we think something bad might happen. It is a normal response to stress and usually causes only a mild reaction. When anxiety becomes more severe, it can interfere with daily life. In some cases, you may not even be aware of what it is you re anxious about. There may also be a genetic link or it may be a learned behavior in the home.  Both psychological and physical triggers cause stress reaction. It's often a response to fear or emotional stress, real or imagined. This stress may come from home, family, work, or social relationships.  During an anxiety reaction, you may feel:    Helpless    Nervous    Depressed    Irritable  Your body may show signs of anxiety in many ways. You may experience:    Dry mouth    Shakiness    Dizziness    Weakness    Trouble breathing    Breathing fast (hyperventilating)    Chest pressure    Sweating    Headache    Nausea    Diarrhea    Tiredness    Inability to sleep    Sexual problems  Home care    Try to locate the sources of stress in your life. They may not be obvious. These may include:  ? Daily hassles of life (such as traffic jams, missed appointments, or car troubles)  ? Major life changes, both good (new baby or job promotion) and bad (loss of job or loss of loved one)  ? Overload: feeling that you have too many responsibilities and can't take care of all of them at once  ? Feeling helpless or feeling that your problems are beyond what you re able to solve    Notice how your body reacts to stress. Learn to listen to your body signals. This will help you take action before the stress becomes severe.    When you can, do something about the source of your stress. (Avoid hassles, limit the amount of change that happens in your life at one time and take a break when you feel overloaded).    Unfortunately, many stressful situations can't be avoided. It is necessary to learn how to better manage stress. There are many proven  methods that will reduce your anxiety. These include simple things like exercise, good nutrition, and adequate rest. Also, there are certain techniques that are helpful:  ? Relaxation  ? Breathing exercises  ? Visualization  ? Biofeedback  ? Meditation  For more information about this, consult your healthcare provider or go to a local bookstore and review the many books and tapes available on this subject.  Follow-up care  If you feel that your anxiety is not responding to self-help measures, contact your healthcare provider or make an appointment with a counselor. You may need short-term psychological counseling and temporary medicine to help you manage stress.  Call 911  Call 911 if any of these happen:    Trouble breathing    Confusion    Drowsiness or trouble wakening    Fainting or loss of consciousness    Rapid heart rate    Seizure    New chest pain that becomes more severe, lasts longer, or spreads into your shoulder, arm, neck, jaw, or back  When to seek medical advice  Call your healthcare provider right away if any of these happen:    Your symptoms get worse    Severe headache not relieved by rest and mild pain reliever  Date Last Reviewed: 10/1/2017    1592-5463 [employer]. [address] [place] [address], [place] [address]. All rights reserved. This information is not intended as a substitute for professional medical care. Always follow your healthcare professional's instructions.
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
## 2018-06-24 NOTE — MR AVS SNAPSHOT
After Visit Summary   6/24/2018    Jaylin Day    MRN: [id number]           Patient Information     Date Of Birth          1980        Visit Information        Provider Department      6/24/2018 2:55 PM Carlee Vasquez MD [facility]        Today's Diagnoses     Dizziness    -  1    Anxiety reaction          Care Instructions      Anxiety Reaction  Anxiety is the feeling we all get when we think something bad might happen. It is a normal response to stress and usually causes only a mild reaction. When anxiety becomes more severe, it can interfere with daily life. In some cases, you may not even be aware of what it is you re anxious about. There may also be a genetic link or it may be a learned behavior in the home.  Both psychological and physical triggers cause stress reaction. It's often a response to fear or emotional stress, real or imagined. This stress may come from home, family, work, or social relationships.  During an anxiety reaction, you may feel:    Helpless    Nervous    Depressed    Irritable  Your body may show signs of anxiety in many ways. You may experience:    Dry mouth    Shakiness    Dizziness    Weakness    Trouble breathing    Breathing fast (hyperventilating)    Chest pressure    Sweating    Headache    Nausea    Diarrhea    Tiredness    Inability to sleep    Sexual problems  Home care    Try to locate the sources of stress in your life. They may not be obvious. These may include:  ? Daily hassles of life (such as traffic jams, missed appointments, or car troubles)  ? Major life changes, both good (new baby or job promotion) and bad (loss of job or loss of loved one)  ? Overload: feeling that you have too many responsibilities and can't take care of all of them at once  ? Feeling helpless or feeling that your problems are beyond what you re able to solve    Notice how your body reacts to stress. Learn to listen to your body signals. This will help  you take action before the stress becomes severe.    When you can, do something about the source of your stress. (Avoid hassles, limit the amount of change that happens in your life at one time and take a break when you feel overloaded).    Unfortunately, many stressful situations can't be avoided. It is necessary to learn how to better manage stress. There are many proven methods that will reduce your anxiety. These include simple things like exercise, good nutrition, and adequate rest. Also, there are certain techniques that are helpful:  ? Relaxation  ? Breathing exercises  ? Visualization  ? Biofeedback  ? Meditation  For more information about this, consult your healthcare provider or go to a local bookstore and review the many books and tapes available on this subject.  Follow-up care  If you feel that your anxiety is not responding to self-help measures, contact your healthcare provider or make an appointment with a counselor. You may need short-term psychological counseling and temporary medicine to help you manage stress.  Call 911  Call 911 if any of these happen:    Trouble breathing    Confusion    Drowsiness or trouble wakening    Fainting or loss of consciousness    Rapid heart rate    Seizure    New chest pain that becomes more severe, lasts longer, or spreads into your shoulder, arm, neck, jaw, or back  When to seek medical advice  Call your healthcare provider right away if any of these happen:    Your symptoms get worse    Severe headache not relieved by rest and mild pain reliever  Date Last Reviewed: 10/1/2017    2872-2014 [employer]. [address] [place] [address]. All rights reserved. This information is not intended as a substitute for professional medical care. Always follow your healthcare professional's instructions.                Follow-ups after your visit        Follow-up notes from your care team     Return in about 2 weeks (around 7/8/2018), or if symptoms  worsen or fail to improve.      Who to contact     If you have questions or need follow up information about today's clinic visit or your schedule please contact [facility] [place] directly at [phone].  Normal or non-critical lab and imaging results will be communicated to you by [employer], letter or phone within 4 business days after the clinic has received the results. If you do not hear from us within 7 days, please contact the clinic through [employer] or phone. If you have a critical or abnormal lab result, we will notify you by phone as soon as possible.  Submit refill requests through [employer] or call your pharmacy and they will forward the refill request to us. Please allow 3 business days for your refill to be completed.          Additional Information About Your Visit        [employer] Information     [employer] gives you secure access to your electronic health record. If you see a primary care provider, you can also send messages to your care team and make appointments. If you have questions, please call your primary care clinic.  If you do not have a primary care provider, please call [phone] and they will assist you.        Care EveryWhere ID     This is your Care EveryWhere ID. This could be used by other organizations to access your [place] medical records  [id number]        Your Vitals Were     Pulse Temperature Respirations Pulse Oximetry BMI (Body Mass Index)       79 97.6  F (36.4  C) (Oral) 18 99% 25.23 kg/m2        Blood Pressure from Last 3 Encounters:   06/24/18 136/84   05/02/18 123/83   02/21/18 112/78    Weight from Last 3 Encounters:   06/24/18 157 lb (71.2 kg)   02/21/18 149 lb (67.6 kg)   02/07/18 149 lb 11.2 oz (67.9 kg)              Today, you had the following     No orders found for display         Today's Medication Changes          These changes are accurate as of 6/24/18  3:55 PM.  If you have any questions, ask your nurse or doctor.               Start taking these  medicines.        Dose/Directions    meclizine 25 MG tablet   Commonly known as:  ANTIVERT   Used for:  Dizziness   Started by:  Carlee Vasquez MD        Dose:  25 mg   Take 1 tablet (25 mg) by mouth every 6 hours as needed for dizziness   Quantity:  30 tablet   Refills:  0            Where to get your medicines      [id number] - [facility], [place] - [address] AT [facility] [address], [facility] [address]     Phone:  [phone]     meclizine 25 MG tablet                Primary Care Provider Office Phone # Fax #    Parminder Taveras [phone] [address]  [facility] [address]        Equal Access to Services     [facility]: Hadii zaina quinn hadasho Soomaali, waaxda luqadaha, qaybta kaalmada adeegyada, twan barnes . So [facility] [phone].    ATENCIÓN: Si habla español, tiene a rosado disposición servicios gratuitos de asistencia lingüística. [facility] [phone].    We comply with applicable federal civil rights laws and [place] laws. We do not discriminate on the basis of race, color, national origin, age, disability, sex, sexual orientation, or gender identity.            Thank you!     Thank you for choosing [facility]  for your care. Our goal is always to provide you with excellent care. Hearing back from our patients is one way we can continue to improve our services. Please take a few minutes to complete the written survey that you may receive in the mail after your visit with us. Thank you!             Your Updated Medication List - Protect others around you: Learn how to safely use, store and throw away your medicines at www.disposemymeds.org.          This list is accurate as of 6/24/18  3:55 PM.  Always use your most recent med list.                   Brand Name Dispense Instructions for use Diagnosis    ibuprofen 200 MG tablet    ADVIL/MOTRIN      Take 200-600 mg by mouth every 4 hours as needed for mild pain or moderate pain        meclizine 25 MG tablet    ANTIVERT    30 tablet    Take 1 tablet (25 mg) by mouth every 6 hours as needed for dizziness    Dizziness       metroNIDAZOLE 1 % gel    METROGEL    60 g    Apply topically daily Apply once daily    Rosacea       MULTIVITAMIN TABS   OR      1 TABLET DAILY    Urinary tract infection, site not specified, UTI (urinary tract infection)       nitroFURantoin (macrocrystal-monohydrate) 100 MG capsule    MACROBID    90 capsule    Take one capsule as needed after intercourse    Recurrent UTI

## 2018-06-25 ASSESSMENT — PATIENT HEALTH QUESTIONNAIRE - PHQ9: SUM OF ALL RESPONSES TO PHQ QUESTIONS 1-9: 3

## 2018-06-25 ASSESSMENT — ANXIETY QUESTIONNAIRES: GAD7 TOTAL SCORE: 4

## 2018-07-15 ENCOUNTER — NURSE TRIAGE (OUTPATIENT)
Dept: NURSING | Facility: CLINIC | Age: 38
End: 2018-07-15

## 2018-07-17 ENCOUNTER — OFFICE VISIT (OUTPATIENT)
Dept: FAMILY MEDICINE | Facility: CLINIC | Age: 38
End: 2018-07-17
Payer: COMMERCIAL

## 2018-07-17 VITALS — DIASTOLIC BLOOD PRESSURE: 82 MMHG | SYSTOLIC BLOOD PRESSURE: 133 MMHG | TEMPERATURE: 98.2 F

## 2018-07-17 DIAGNOSIS — Z71.84 TRAVEL ADVICE ENCOUNTER: Primary | ICD-10-CM

## 2018-07-17 PROCEDURE — 90713 POLIOVIRUS IPV SC/IM: CPT | Mod: GA | Performed by: FAMILY MEDICINE

## 2018-07-17 PROCEDURE — 99402 PREV MED CNSL INDIV APPRX 30: CPT | Mod: 25 | Performed by: FAMILY MEDICINE

## 2018-07-17 PROCEDURE — 90691 TYPHOID VACCINE IM: CPT | Mod: GA | Performed by: FAMILY MEDICINE

## 2018-07-17 PROCEDURE — 90472 IMMUNIZATION ADMIN EACH ADD: CPT | Mod: GA | Performed by: FAMILY MEDICINE

## 2018-07-17 PROCEDURE — 90636 HEP A/HEP B VACC ADULT IM: CPT | Mod: GA | Performed by: FAMILY MEDICINE

## 2018-07-17 PROCEDURE — 90734 MENACWYD/MENACWYCRM VACC IM: CPT | Mod: GA | Performed by: FAMILY MEDICINE

## 2018-07-17 PROCEDURE — 90471 IMMUNIZATION ADMIN: CPT | Mod: GA | Performed by: FAMILY MEDICINE

## 2018-07-17 PROCEDURE — 90717 YELLOW FEVER VACCINE SUBQ: CPT | Mod: GA | Performed by: FAMILY MEDICINE

## 2018-07-17 RX ORDER — AZITHROMYCIN 500 MG/1
TABLET, FILM COATED ORAL
Qty: 3 TABLET | Refills: 0 | Status: SHIPPED | OUTPATIENT
Start: 2018-07-17 | End: 2019-01-07

## 2018-07-17 RX ORDER — ATOVAQUONE AND PROGUANIL HYDROCHLORIDE 250; 100 MG/1; MG/1
1 TABLET, FILM COATED ORAL DAILY
Qty: 19 TABLET | Refills: 0 | Status: SHIPPED | OUTPATIENT
Start: 2018-07-17 | End: 2019-01-07

## 2018-07-17 NOTE — NURSING NOTE
"Chief Complaint   Patient presents with     [facility]     initial /82 (BP Location: Left arm, Cuff Size: Adult Regular)  Temp 98.2  F (36.8  C) (Oral)  LMP 06/25/2018 Estimated body mass index is 25.23 kg/(m^2) as calculated from the following:    Height as of 2/21/18: 5' 6.14\" (1.68 m).    Weight as of 6/24/18: 157 lb (71.2 kg).  BP completed using cuff size: regular.  L arm      Health Maintenance that is potentially due pending provider review:  NONE    n/a    David Becerra ma  "

## 2018-07-17 NOTE — PROGRESS NOTES
Itinerary:  [facility]    Departure Date: 7/31/18    Return Date: 8/10/18    Length of Trip: 11 days    Purpose of Trip: [place] Trip    Urban/Rural: urban    Accommodations: Orphanage    IMMUNIZATION HISTORY  Have you received any immunizations within the past 4 weeks?  No  Have you ever fainted from having your blood drawn or from an injection?  No  Have you ever had a fever reaction to vaccination?  No  Have you ever had any bad reaction or side effect from any vaccination?  No  Have you ever had hepatitis A or B vaccine?  No  Do you live (or work closely) with anyone who has AIDS, an AIDS-like condition, any other immune disorder or who is on chemotherapy for cancer or a   family history of immunodeficiency?  No  Have you received any injection of immune globulin or any blood products during the past 12 months?  No    Patient roomed by David Becerra ma      Special medical concerns: none    /82 (BP Location: Left arm, Cuff Size: Adult Regular)  Temp 98.2  F (36.8  C) (Oral)  LMP 06/25/2018  EXAM: deferred    Immunizations discussed include: Hepatitis A, Hepatitis B, Typhoid, Yellow Fever, Meningococcus and Polio  Malaraia prophylaxis recommended: Malarone  Symptomatic treatment for traveler's diarrhea: azithromycin    ASSESSMENT/PLAN:    ICD-10-CM    1. Travel advice encounter Z71.89 HEPA/HEPB VACCINE ADULT IM     HEPA/HEPB VACCINE ADULT IM     TYPHOID VACCINE, IM     YELLOW FEVER IMMUNIZATN,LIVE,SUBCUT     azithromycin (ZITHROMAX) 500 MG tablet     POLIOVIRUS VACC INACTIVATED SUBQ/IM     MENINGOCOCCAL VACCINE,IM (MENACTRA)     atovaquone-proguanil (MALARONE) 250-100 MG per tablet     ADMIN 1st VACCINE     EA ADD'L VACCINE     I have reviewed general recommendations for safe travel   including: food/water precautions, insect avoidance, safe sex   practices given high prevalence of HIV and other STDs,   roadway safety. Educational materials and Travax report provided.    Total visit time 30 minutes with  over 50% of time spent counseling patient.

## 2018-07-17 NOTE — MR AVS SNAPSHOT
After Visit Summary   7/17/2018    Jaylin Day    MRN: [id number]           Patient Information     Date Of Birth          1980        Visit Information        Provider Department      7/17/2018 3:45 PM Roman De La Cruz MD [facility]        Today's Diagnoses     Travel advice encounter    -  1       Follow-ups after your visit        Who to contact     If you have questions or need follow up information about today's clinic visit or your schedule please contact [facility] directly at [phone].  Normal or non-critical lab and imaging results will be communicated to you by HRBosshart, letter or phone within 4 business days after the clinic has received the results. If you do not hear from us within 7 days, please contact the clinic through YingYangt or phone. If you have a critical or abnormal lab result, we will notify you by phone as soon as possible.  Submit refill requests through tuul or call your pharmacy and they will forward the refill request to us. Please allow 3 business days for your refill to be completed.          Additional Information About Your Visit        MyChart Information     tuul gives you secure access to your electronic health record. If you see a primary care provider, you can also send messages to your care team and make appointments. If you have questions, please call your primary care clinic.  If you do not have a primary care provider, please call [phone] and they will assist you.        Care EveryWhere ID     This is your Care EveryWhere ID. This could be used by other organizations to access your [place] medical records  [id number]        Your Vitals Were     Temperature Last Period                98.2  F (36.8  C) (Oral) 06/25/2018           Blood Pressure from Last 3 Encounters:   07/17/18 133/82   06/24/18 136/84   05/02/18 123/83    Weight from Last 3 Encounters:   06/24/18 157 lb (71.2 kg)   02/21/18 149 lb  (67.6 kg)   02/07/18 149 lb 11.2 oz (67.9 kg)              We Performed the Following     ADMIN 1st VACCINE     EA ADD'L VACCINE     HEPA/HEPB VACCINE ADULT IM     MENINGOCOCCAL VACCINE,IM (MENACTRA)     POLIOVIRUS VACC INACTIVATED SUBQ/IM     TYPHOID VACCINE, IM     YELLOW FEVER IMMUNIZATN,LIVE,SUBCUT          Today's Medication Changes          These changes are accurate as of 7/17/18 11:59 PM.  If you have any questions, ask your nurse or doctor.               Start taking these medicines.        Dose/Directions    atovaquone-proguanil 250-100 MG per tablet   Commonly known as:  MALARONE   Used for:  Travel advice encounter   Started by:  Roman De La Cruz MD        Dose:  1 tablet   Take 1 tablet by mouth daily Start 1 day before travelling to a Malaria risk area and continue until 7 days after return.   Quantity:  19 tablet   Refills:  0       azithromycin 500 MG tablet   Commonly known as:  ZITHROMAX   Used for:  Travel advice encounter   Started by:  Roman De La Cruz MD        Take one tablet daily for up to 3 days as needed for traveler's diarrhea   Quantity:  3 tablet   Refills:  0            Where to get your medicines      These [id number] - [place], [place] - [address] AT [facility] [address], [facility] [address]     Phone:  [phone]     atovaquone-proguanil 250-100 MG per tablet    azithromycin 500 MG tablet                Primary Care Provider Office Phone # Fax #    Parminder Taveras [phone] [address]  [facility] [address]        Equal Access to Services     [facility] AH: Hadii aad ku hadasho Soomaali, waaxda luqadaha, qaybta kaalmada adeegyada, twan paul. So [facility] [phone].    ATENCIÓN: Si habla español, tiene a rosado disposición servicios gratuitos de asistencia lingüística. Nico al [phone].    We comply with applicable [facility] civil  rights laws and [place] laws. We do not discriminate on the basis of race, color, national origin, age, disability, sex, sexual orientation, or gender identity.            Thank you!     Thank you for choosing [facility]  for your care. Our goal is always to provide you with excellent care. Hearing back from our patients is one way we can continue to improve our services. Please take a few minutes to complete the written survey that you may receive in the mail after your visit with us. Thank you!             Your Updated Medication List - Protect others around you: Learn how to safely use, store and throw away your medicines at www.disposemymeds.org.          This list is accurate as of 7/17/18 11:59 PM.  Always use your most recent med list.                   Brand Name Dispense Instructions for use Diagnosis    atovaquone-proguanil 250-100 MG per tablet    MALARONE    19 tablet    Take 1 tablet by mouth daily Start 1 day before travelling to a Malaria risk area and continue until 7 days after return.    Travel advice encounter       azithromycin 500 MG tablet    ZITHROMAX    3 tablet    Take one tablet daily for up to 3 days as needed for traveler's diarrhea    Travel advice encounter       ibuprofen 200 MG tablet    ADVIL/MOTRIN     Take 200-600 mg by mouth every 4 hours as needed for mild pain or moderate pain        meclizine 25 MG tablet    ANTIVERT    30 tablet    Take 1 tablet (25 mg) by mouth every 6 hours as needed for dizziness    Dizziness       metroNIDAZOLE 1 % gel    METROGEL    60 g    Apply topically daily Apply once daily    Rosacea       MULTIVITAMIN TABS   OR      1 TABLET DAILY    Urinary tract infection, site not specified, UTI (urinary tract infection)       nitroFURantoin (macrocrystal-monohydrate) 100 MG capsule    MACROBID    90 capsule    Take one capsule as needed after intercourse    Recurrent UTI

## 2018-10-05 ENCOUNTER — OFFICE VISIT (OUTPATIENT)
Dept: URGENT CARE | Facility: URGENT CARE | Age: 38
End: 2018-10-05
Payer: COMMERCIAL

## 2018-10-05 VITALS
WEIGHT: 148.4 LBS | OXYGEN SATURATION: 99 % | RESPIRATION RATE: 16 BRPM | DIASTOLIC BLOOD PRESSURE: 78 MMHG | BODY MASS INDEX: 23.85 KG/M2 | HEART RATE: 90 BPM | SYSTOLIC BLOOD PRESSURE: 126 MMHG | TEMPERATURE: 98.2 F

## 2018-10-05 DIAGNOSIS — R05.8 SPASMODIC COUGH: Primary | ICD-10-CM

## 2018-10-05 PROCEDURE — 94640 AIRWAY INHALATION TREATMENT: CPT | Performed by: PHYSICIAN ASSISTANT

## 2018-10-05 PROCEDURE — 99213 OFFICE O/P EST LOW 20 MIN: CPT | Mod: 25 | Performed by: PHYSICIAN ASSISTANT

## 2018-10-05 PROCEDURE — 87801 DETECT AGNT MULT DNA AMPLI: CPT | Performed by: PHYSICIAN ASSISTANT

## 2018-10-05 RX ORDER — BENZONATATE 100 MG/1
100 CAPSULE ORAL 3 TIMES DAILY PRN
Qty: 20 CAPSULE | Refills: 0 | Status: SHIPPED | OUTPATIENT
Start: 2018-10-05 | End: 2019-01-07

## 2018-10-05 RX ORDER — CODEINE PHOSPHATE AND GUAIFENESIN 10; 100 MG/5ML; MG/5ML
1-2 SOLUTION ORAL EVERY 4 HOURS PRN
Qty: 180 ML | Refills: 0 | Status: SHIPPED | OUTPATIENT
Start: 2018-10-05 | End: 2019-01-07

## 2018-10-05 RX ORDER — AZITHROMYCIN 250 MG/1
TABLET, FILM COATED ORAL
Qty: 6 TABLET | Refills: 0 | Status: SHIPPED | OUTPATIENT
Start: 2018-10-05 | End: 2019-01-07

## 2018-10-05 RX ORDER — ALBUTEROL SULFATE 0.83 MG/ML
2.5 SOLUTION RESPIRATORY (INHALATION) ONCE
Qty: 3 ML | Refills: 0 | Status: SHIPPED | OUTPATIENT
Start: 2018-10-05 | End: 2019-01-07

## 2018-10-05 NOTE — PROGRESS NOTES
S: 39 yo female presents with cough and chest chest congestion for 1 week.  Started with a sore throat for 2 days which resolved.  She now has been having a spasmatic dry cough.  The cough spasms are really bad at night.  Her  kicked her out of the bedroom last night.  No fever.  No vomiting.  Mucinex makes it worse.  No history of asthma or allergies.  No shortness of breath.  No calf pain or swelling.  No recent travel in the car or on airplane.  Not on hormones.    Quit smoking 2006.    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 (Patient not taking: Reported on 10/5/2018)   azithromycin (ZITHROMAX) 500 MG tablet Take one tablet daily for up to 3 days as needed for traveler's diarrhea (Patient not taking: Reported on 10/5/2018)   meclizine (ANTIVERT) 25 MG tablet Take 1 tablet (25 mg) by mouth every 6 hours as needed for dizziness (Patient not taking: Reported on 10/5/2018)   MULTIVITAMIN TABS   OR 1 TABLET DAILY   nitroFURantoin, macrocrystal-monohydrate, (MACROBID) 100 MG capsule Take one capsule as needed after intercourse (Patient not taking: Reported on 10/5/2018)     No current facility-administered medications on file prior to visit.     Social History   Substance Use Topics     Smoking status: Former Smoker     Types: Cigarettes     Quit date: 1/1/2006     Smokeless  tobacco: Never Used     Alcohol use Yes      Comment: rare       ROS:  CONSTITUTIONAL: Negative for fatigue or fever.  EYES: Negative for eye problems.  ENT: As above.  RESP: As above.  CV: Negative for chest pains.  GI: Negative for vomiting.  MUSCULOSKELETAL:  Negative for significant muscle or joint pains.  NEUROLOGIC: Negative for headaches.  SKIN: Negative for rash.    OBJECTIVE:  /78  Pulse 90  Temp 98.2  F (36.8  C) (Oral)  Resp 16  Wt 148 lb 6.4 oz (67.3 kg)  SpO2 99%  BMI 23.85 kg/m2  GENERAL APPEARANCE: Healthy, alert and no distress.  EYES:Conjunctiva/sclera clear.  EARS: No cerumen.   Ear canals w/o erythema.  TM's intact w/o erythema.    NOSE/MOUTH: Nose without ulcers, erythema or lesions.  SINUSES: No maxillary sinus tenderness.  THROAT: No erythema w/o tonsillar enlargement . No exudates.  NECK: Supple, nontender, no lymphadenopathy.  RESP: Lungs clear to auscultation - no rales, rhonchi or wheezes  CV: Regular rate and rhythm, normal S1 S2, no murmur noted.  NEURO: Awake, alert    SKIN: No rashes  No calf tenderness.  Neb treatment- no help. Lungs sound the same    ASSESSMENT:     ICD-10-CM    1. Spasmodic cough R05 Bordetella pert parapert DNA pcr     guaiFENesin-codeine (ROBITUSSIN AC) 100-10 MG/5ML SOLN solution     benzonatate (TESSALON) 100 MG capsule     azithromycin (ZITHROMAX Z-KIMBERLY) 250 MG tablet       PLAN:Will call with whooping cough results.  Lots of rest and fluids.  RTC if any worsening symptoms or if not improving.    Maite Campos PA-C

## 2018-10-05 NOTE — MR AVS SNAPSHOT
After Visit Summary   10/5/2018    Jaylin Day    MRN: [id number]           Patient Information     Date Of Birth          1980        Visit Information        Provider Department      10/5/2018 4:05 PM Maite Campos PA-C [facility]        Today's Diagnoses     Spasmodic cough    -  1       Follow-ups after your visit        Who to contact     If you have questions or need follow up information about today's clinic visit or your schedule please contact [facility] directly at [phone].  Normal or non-critical lab and imaging results will be communicated to you by Sparkcloudhart, letter or phone within 4 business days after the clinic has received the results. If you do not hear from us within 7 days, please contact the clinic through QRxPharmat or phone. If you have a critical or abnormal lab result, we will notify you by phone as soon as possible.  Submit refill requests through Neck Tie Koozies or call your pharmacy and they will forward the refill request to us. Please allow 3 business days for your refill to be completed.          Additional Information About Your Visit        MyChart Information     Neck Tie Koozies gives you secure access to your electronic health record. If you see a primary care provider, you can also send messages to your care team and make appointments. If you have questions, please call your primary care clinic.  If you do not have a primary care provider, please call [phone] and they will assist you.        Care EveryWhere ID     This is your Care EveryWhere ID. This could be used by other organizations to access your [place] medical records  [id number]        Your Vitals Were     Pulse Temperature Respirations Pulse Oximetry BMI (Body Mass Index)       90 98.2  F (36.8  C) (Oral) 16 99% 23.85 kg/m2        Blood Pressure from Last 3 Encounters:   10/05/18 126/78   07/17/18 133/82   06/24/18 136/84    Weight from Last 3 Encounters:    10/05/18 148 lb 6.4 oz (67.3 kg)   06/24/18 157 lb (71.2 kg)   02/21/18 149 lb (67.6 kg)              We Performed the Following     Bordetella pert parapert DNA pcr     INHALATION/NEBULIZER TREATMENT, INITIAL          Today's Medication Changes          These changes are accurate as of 10/5/18  4:46 PM.  If you have any questions, ask your nurse or doctor.               Start taking these medicines.        Dose/Directions    albuterol (2.5 MG/3ML) 0.083% neb solution   Started by:  Maite Campos PA-C        Dose:  2.5 mg   Take 1 vial (2.5 mg) by nebulization once for 1 dose   Quantity:  3 mL   Refills:  0       benzonatate 100 MG capsule   Commonly known as:  TESSALON   Used for:  Spasmodic cough   Started by:  Maite Campos PA-C        Dose:  100 mg   Take 1 capsule (100 mg) by mouth 3 times daily as needed for cough   Quantity:  20 capsule   Refills:  0       guaiFENesin-codeine 100-10 MG/5ML Soln solution   Commonly known as:  ROBITUSSIN AC   Used for:  Spasmodic cough   Started by:  Maite Campos PA-C        Dose:  1-2 tsp.   Take 5-10 mLs by mouth every 4 hours as needed for cough   Quantity:  180 mL   Refills:  0         These medicines have changed or have updated prescriptions.        Dose/Directions    * azithromycin 500 MG tablet   Commonly known as:  ZITHROMAX   This may have changed:  Another medication with the same name was added. Make sure you understand how and when to take each.   Used for:  Travel advice encounter   Changed by:  Maite Campos PA-C        Take one tablet daily for up to 3 days as needed for traveler's diarrhea   Quantity:  3 tablet   Refills:  0       * azithromycin 250 MG tablet   Commonly known as:  ZITHROMAX Z-KIMBERLY   This may have changed:  You were already taking a medication with the same name, and this prescription was added. Make sure you understand how and when to take each.   Used for:  Spasmodic cough   Changed by:  Maite Campos PA-C         2 tabs day one then 1 tab qd   Quantity:  6 tablet   Refills:  0       * Notice:  This list has 2 medication(s) that are the same as other medications prescribed for you. Read the directions carefully, and ask your doctor or other care provider to review them with you.         Where to get your medicines      These medications were sent to [id number] - [place], [place] - [address] AT [facility] [address], [facility] [address]     Phone:  [phone]     albuterol (2.5 MG/3ML) 0.083% neb solution    azithromycin 250 MG tablet    benzonatate 100 MG capsule         Some of these will need a paper prescription and others can be bought over the counter.  Ask your nurse if you have questions.     Bring a paper prescription for each of these medications     guaiFENesin-codeine 100-10 MG/5ML Soln solution                Primary Care Provider Office Phone # Fax #    Parminder Taveras [phone] [address]  [facility] [address]        Equal Access to Services     [facility] AH: Hadii aad ku hadasho Soomaali, waaxda luqadaha, qaybta kaalmada adeegyada, twan barnes . So [facility] [phone].    ATENCIÓN: Si habla español, tiene a rosado disposición servicios gratuitos de asistencia lingüística. [facility] [phone].    We comply with applicable federal civil rights laws and [place] laws. We do not discriminate on the basis of race, color, national origin, age, disability, sex, sexual orientation, or gender identity.            Thank you!     Thank you for choosing [facility]  for your care. Our goal is always to provide you with excellent care. Hearing back from our patients is one way we can continue to improve our services. Please take a few minutes to complete the written survey that you may receive in the mail after your visit with us. Thank you!             Your Updated Medication List - Protect  others around you: Learn how to safely use, store and throw away your medicines at www.disposemymeds.org.          This list is accurate as of 10/5/18  4:46 PM.  Always use your most recent med list.                   Brand Name Dispense Instructions for use Diagnosis    albuterol (2.5 MG/3ML) 0.083% neb solution     3 mL    Take 1 vial (2.5 mg) by nebulization once for 1 dose        atovaquone-proguanil 250-100 MG per tablet    MALARONE    19 tablet    Take 1 tablet by mouth daily Start 1 day before travelling to a Malaria risk area and continue until 7 days after return.    Travel advice encounter       * azithromycin 500 MG tablet    ZITHROMAX    3 tablet    Take one tablet daily for up to 3 days as needed for traveler's diarrhea    Travel advice encounter       * azithromycin 250 MG tablet    ZITHROMAX Z-KIMBERLY    6 tablet    2 tabs day one then 1 tab qd    Spasmodic cough       benzonatate 100 MG capsule    TESSALON    20 capsule    Take 1 capsule (100 mg) by mouth 3 times daily as needed for cough    Spasmodic cough       guaiFENesin-codeine 100-10 MG/5ML Soln solution    ROBITUSSIN AC    180 mL    Take 5-10 mLs by mouth every 4 hours as needed for cough    Spasmodic cough       ibuprofen 200 MG tablet    ADVIL/MOTRIN     Take 200-600 mg by mouth every 4 hours as needed for mild pain or moderate pain        meclizine 25 MG tablet    ANTIVERT    30 tablet    Take 1 tablet (25 mg) by mouth every 6 hours as needed for dizziness    Dizziness       metroNIDAZOLE 1 % gel    METROGEL    60 g    Apply topically daily Apply once daily    Rosacea       MUCINEX D PO           MULTIVITAMIN TABS   OR      1 TABLET DAILY    Urinary tract infection, site not specified, UTI (urinary tract infection)       nitroFURantoin (macrocrystal-monohydrate) 100 MG capsule    MACROBID    90 capsule    Take one capsule as needed after intercourse    Recurrent UTI       * Notice:  This list has 2 medication(s) that are the same as other  medications prescribed for you. Read the directions carefully, and ask your doctor or other care provider to review them with you.

## 2018-10-06 ENCOUNTER — NURSE TRIAGE (OUTPATIENT)
Dept: NURSING | Facility: CLINIC | Age: 38
End: 2018-10-06

## 2018-10-06 NOTE — TELEPHONE ENCOUNTER
Patient calling reporting she was not given albuterol nebulizer machine to use for the albuterol she was prescribed yesterday at Urgent Care. RN called [facility] back line to speak with a nurse. [facility] nurse states patient received a dose of albuterol in clinic and was not supposed to take albuterol medication at home.   albuterol (2.5 MG/3ML) 0.083% neb solution 3 mL 0 10/5/2018 10/5/2018 --   Sig - Route: Take 1 vial (2.5 mg) by nebulization once for 1 dose - Nebulization   Class: E-Prescribe       RN called patient and informed there was an error with pharmacy/clinic and patient does not need the albuterol medication. Patient reports her symptoms are the same from yesterday and refused triage.     Additional Information    Caller has medication question, adult has minor symptoms, caller declines triage, and triager answers question    Protocols used: MEDICATION QUESTION CALL-ADULT-

## 2018-10-08 ENCOUNTER — TELEPHONE (OUTPATIENT)
Dept: URGENT CARE | Facility: URGENT CARE | Age: 38
End: 2018-10-08

## 2018-10-08 LAB
B PERT+PARAPERT DNA PNL SPEC NAA+PROBE: NEGATIVE
SPECIMEN SOURCE: NORMAL

## 2018-10-08 NOTE — TELEPHONE ENCOUNTER
Notes Recorded by Arnav More PA-C on 10/8/2018 at 4:02 PM  Please notify patient of negative pertussis.  Have her finish the antibiotic.  Coughing may continue for several weeks.  Please have her follow up with her regular doctor if symptoms are not improving or worsen.    Arnav More    This writer attempted to contact Jaylin on 10/08/18      Reason for call Lab results and unable to leave message. Seems like patient gave a work number rather than cell phone number. Unsure which number to leave message on.      If patient calls back:   Patient contacted by 1st floor [facility] Team (MA/TC). Inform patient that someone from the team will contact them, document that pt called and route to care team.         Danisha Zhong MA

## 2018-10-09 NOTE — TELEPHONE ENCOUNTER
Called and informed patient of lab results. Patient states that they are feeling better after finishing antibiotics.    Danisha Zhong MA on 10/9/2018 at 2:36 PM

## 2018-10-09 NOTE — TELEPHONE ENCOUNTER
This writer attempted to contact Jaylin on 10/09/18      Reason for call lab results and unable to leave message.      If patient calls back:   Patient contacted by 1st floor [facility] (MA/TC). Inform patient that someone from the team will contact them, document that pt called and route to care team.         Danisha Zhong MA

## 2018-12-04 ENCOUNTER — MYC MEDICAL ADVICE (OUTPATIENT)
Dept: PEDIATRICS | Facility: CLINIC | Age: 38
End: 2018-12-04

## 2018-12-18 NOTE — TELEPHONE ENCOUNTER
3rd attempt to schedule    Left message for patient to return clinic call regarding scheduling. Patient needs a Physical  appointment for annual wellness with  on or after 2/7/19 and  Fasting labs. Number to clinic and Mychart option given, please assist in scheduling once patient returns clinic call from the recall/wait list tab.    Call Center OKAY TO SCHEDULE.    Thanks,   Nury Vincent  Primary Care   [facility] [place]      1st  Recall Sent 11/1    Mychart unread.

## 2019-01-07 ENCOUNTER — OFFICE VISIT (OUTPATIENT)
Dept: FAMILY MEDICINE | Facility: CLINIC | Age: 39
End: 2019-01-07
Payer: COMMERCIAL

## 2019-01-07 VITALS
OXYGEN SATURATION: 99 % | HEIGHT: 66 IN | HEART RATE: 89 BPM | DIASTOLIC BLOOD PRESSURE: 83 MMHG | WEIGHT: 142.2 LBS | SYSTOLIC BLOOD PRESSURE: 120 MMHG | BODY MASS INDEX: 22.85 KG/M2 | TEMPERATURE: 98.1 F

## 2019-01-07 DIAGNOSIS — M54.42 ACUTE BILATERAL LOW BACK PAIN WITH LEFT-SIDED SCIATICA: Primary | ICD-10-CM

## 2019-01-07 PROBLEM — M54.41 BILATERAL LOW BACK PAIN WITH RIGHT-SIDED SCIATICA: Status: ACTIVE | Noted: 2018-03-07

## 2019-01-07 PROCEDURE — 99213 OFFICE O/P EST LOW 20 MIN: CPT | Performed by: NURSE PRACTITIONER

## 2019-01-07 RX ORDER — CYCLOBENZAPRINE HCL 10 MG
10 TABLET ORAL 3 TIMES DAILY PRN
Qty: 15 TABLET | Refills: 1 | Status: SHIPPED | OUTPATIENT
Start: 2019-01-07 | End: 2019-02-13

## 2019-01-07 ASSESSMENT — MIFFLIN-ST. JEOR: SCORE: 1343.98

## 2019-01-07 NOTE — PROGRESS NOTES
SUBJECTIVE:   Jaylin Day is a 38 year old female who presents to clinic today for the following health issues:    Back Pain       Duration: since dec 24        Specific cause: none    Description:   Location of pain: low back both  Character of pain: shooting pain  Pain radiation:radiates into the left leg  New numbness or weakness in legs, not attributed to pain:  YES    Intensity: Currently 5/10    History:   Pain interferes with job: YES  History of back problems: previous back surgery two years ago  Any previous MRI or X-rays: None  Sees a specialist for back pain:  No  Therapies tried without relief: ibuprofen    Alleviating factors:   Improved by: ibuprofen during the day helps some      Precipitating factors:  Worsened by: Bending, Sitting and Lying Flat        Accompanying Signs & Symptoms:  Risk of Fracture:  None  Risk of Cauda Equina:  None  Risk of Infection:  None  Risk of Cancer:  None  Risk of Ankylosing Spondylitis:  Onset at age <35, male, AND morning back stiffness. no      Problem list and histories reviewed & adjusted, as indicated.  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     DUB (dysfunctional uterine bleeding)     S/P lumbar spine operation     Bilateral low back pain without sciatica, unspecified chronicity     History of lumbar surgery     Family history of thyroid disorder     Bilateral low back pain with right-sided sciatica     Past Surgical History:   Procedure Laterality Date     DECOMPRESSION, FUSION LUMBAR POSTERIOR THREE + LEVELS, COMBINED         Social History     Tobacco Use     Smoking status: Former Smoker     Types: Cigarettes     Last attempt to quit: 2006     Years since quittin.0     Smokeless tobacco: Never Used   Substance Use Topics     Alcohol use: Yes     Comment: rare     Family History    Problem Relation Age of Onset     Hypertension Maternal Grandmother      Thyroid Disease Son         hypothyroidism, congenital     Hypertension Maternal Uncle      C.A.D. No family hx of      Diabetes No family hx of          Current Outpatient Medications   Medication Sig Dispense Refill     benzonatate (TESSALON) 100 MG capsule Take 1 capsule (100 mg) by mouth 3 times daily as needed for cough 20 capsule 0     guaiFENesin-codeine (ROBITUSSIN AC) 100-10 MG/5ML SOLN solution Take 5-10 mLs by mouth every 4 hours as needed for cough 180 mL 0     ibuprofen (ADVIL,MOTRIN) 200 MG tablet Take 200-600 mg by mouth every 4 hours as needed for mild pain or moderate pain        metroNIDAZOLE (METROGEL) 1 % gel Apply topically daily Apply once daily 60 g 5     MULTIVITAMIN TABS   OR 1 TABLET DAILY       nitroFURantoin macrocrystal-monohydrate (MACROBID) 100 MG capsule TAKE ONE CAPSULE BY MOUTH AFTER INTERCOURSE AS NEEDED 90 capsule 0     Pseudoephedrine-Guaifenesin (MUCINEX D PO)        albuterol (2.5 MG/3ML) 0.083% neb solution Take 1 vial (2.5 mg) by nebulization once for 1 dose 3 mL 0     atovaquone-proguanil (MALARONE) 250-100 MG per tablet Take 1 tablet by mouth daily Start 1 day before travelling to a Malaria risk area and continue until 7 days after return. (Patient not taking: Reported on 10/5/2018) 19 tablet 0     azithromycin (ZITHROMAX Z-KIMBERLY) 250 MG tablet 2 tabs day one then 1 tab qd (Patient not taking: Reported on 1/7/2019) 6 tablet 0     azithromycin (ZITHROMAX) 500 MG tablet Take one tablet daily for up to 3 days as needed for traveler's diarrhea (Patient not taking: Reported on 10/5/2018) 3 tablet 0     meclizine (ANTIVERT) 25 MG tablet Take 1 tablet (25 mg) by mouth every 6 hours as needed for dizziness (Patient not taking: Reported on 10/5/2018) 30 tablet 0     BP Readings from Last 3 Encounters:   01/07/19 120/83   10/05/18 126/78   07/17/18 133/82    Wt Readings from Last 3 Encounters:   01/07/19 64.5 kg  "(142 lb 3.2 oz)   10/05/18 67.3 kg (148 lb 6.4 oz)   06/24/18 71.2 kg (157 lb)                    Reviewed and updated as needed this visit by clinical staff  Tobacco  Allergies  Meds  Med Hx  Surg Hx  Fam Hx  Soc Hx      Reviewed and updated as needed this visit by Provider         ROS:  Constitutional, HEENT, cardiovascular, pulmonary, gi and gu systems are negative, except as otherwise noted.    OBJECTIVE:     /83   Pulse 89   Temp 98.1  F (36.7  C) (Oral)   Ht 1.68 m (5' 6.14\")   Wt 64.5 kg (142 lb 3.2 oz)   LMP 12/19/2018   SpO2 99%   BMI 22.85 kg/m    Body mass index is 22.85 kg/m .  GENERAL: healthy, alert and no distress  EYES: Eyes grossly normal to inspection, PERRL and conjunctivae and sclerae normal  HENT: ear canals and TM's normal, nose and mouth without ulcers or lesions  NECK: no adenopathy, no asymmetry, masses, or scars and thyroid normal to palpation  RESP: lungs clear to auscultation - no rales, rhonchi or wheezes  CV: regular rate and rhythm, normal S1 S2, no S3 or S4, no murmur, click or rub, no peripheral edema and peripheral pulses strong  ABDOMEN: soft, nontender, no hepatosplenomegaly, no masses and bowel sounds normal  MS: no gross musculoskeletal defects noted, no edema  SKIN: no suspicious lesions or rashes  NEURO: Normal strength and tone, mentation intact and speech normal  Comprehensive back pain exam:  Tenderness of left SI joint, Pain limits the following motions: none, Lower extremity strength functional and equal on both sides, Lower extremity reflexes within normal limits bilaterally, Lower extremity sensation normal and equal on both sides and Straight leg positive on right, indicating possible ipsilateral radiculopathy  PSYCH: mentation appears normal, affect normal/bright  LYMPH: normal ant/post cervical, supraclavicular nodes    Diagnostic Test Results:  none     ASSESSMENT/PLAN:       BP Screening:   Last 3 BP Readings:    BP Readings from Last 3 " "Encounters:   01/07/19 120/83   10/05/18 126/78   07/17/18 133/82       The following was recommended to the patient:  Re-screen BP within a year and recommended lifestyle modifications  BMI:   Estimated body mass index is 22.85 kg/m  as calculated from the following:    Height as of this encounter: 1.68 m (5' 6.14\").    Weight as of this encounter: 64.5 kg (142 lb 3.2 oz).         1. Acute bilateral low back pain with left-sided sciatica    The patient was advised to apply heat intermittently (avoid sleeping on heating pad).  Lifting mechanics discussed  Analgesics such as Tylenol and/or ibuprofen, see epic for orders.  Back exercises, instruction sheet given  Aerobic exercise program, this was strongly encouraged as one of the best ways to manage chronic back pain    Patient was instructed to f/u if symptoms worsen or fail to improve as anticipated.    - [facility], [facility]OPRACTIC REFERRAL; Future    Patient Instructions     At [facility], we strive to deliver an exceptional experience to you, every time we see you.  If you receive a survey in the mail, please send us back your thoughts. We really do value your feedback.    Your care team:                            Family Medicine Internal Medicine   MD Alf Nunn MD Shantel Branch-Fleming, MD Katya Georgiev PA-C Megan Hill, APRN CNP Nam Ho, MD Pediatrics   Prakash Livingston, SAMANTHA Gonzalez, MD Skye Escobedo APRN CNP   MD Mecca Navarro MD Deborah Mielke, MD Kim Thein, APRN [facility]      Clinic hours: Monday - Thursday 7 am-7 pm; Fridays 7 am-5 pm.   Urgent care: Monday - Friday 11 am-9 pm; Saturday and Sunday 9 am-5 pm.  Pharmacy : Monday -Thursday 8 am-8 pm; Friday 8 am-6 pm; Saturday and Sunday 9 am-5 pm.     Clinic: [phone]   Pharmacy: [phone]        Patient Education     Exercises to Strengthen Your Lower Back  Strong lower back and abdominal muscles " work together to support your spine. The exercises below will help strengthen the lower back. It is important that you begin exercising slowly and increase levels gradually.  Always begin any exercise program with stretching. If you feel pain while doing any of these exercises, stop and talk to your doctor about a more specific exercise program that better suits your condition.   Low back stretch  The point of stretching is to make you more flexible and increase your range of motion. Stretch only as much as you are able. Stretch slowly. Do not push your stretch to the limit. If at any point you feel pain while stretching, this is your (temporary) limit.    Lie on your back with your knees bent and both feet on the ground.    Slowly raise your left knee to your chest as you flatten your lower back against the floor. Hold for 5 seconds.    Relax and repeat the exercise with your right knee.    Do 10 of these exercises for each leg.    Repeat hugging both knees to your chest at the same time.  Building lower back strength  Start your exercise routine with 10 to 30 minutes a day, 1 to 3 times a day.  Initial exercises  Lying on your back:  1. Ankle pumps: Move your foot up and down, towards your head, and then away. Repeat 10 times with each foot.  2. Heel slides: Slowly bend your knee, drawing the heel of your foot towards you. Then slide your heel/foot from you, straightening your knee. Do not lift your foot off the floor (this is not a leg lift).  3. Abdominal contraction: Bend your knees and put your hands on your stomach. Tighten your stomach muscles. Hold for 5 seconds, then relax. Repeat 10 times.  4. Straight leg raise: Bend one leg at the knee and keep the other leg straight. Tighten your stomach muscles. Slowly lift your straight leg 6 to 12 inches off the floor and hold for up to 5 seconds. Repeat 10 times on each side.  Standin. Wall squats: Stand with your back against the wall. Move your feet about 12  inches away from the wall. Tighten your stomach muscles, and slowly bend your knees until they are at about a 45 degree angle. Do not go down too far. Hold about 5 seconds. Then slowly return to your starting position. Repeat 10 times.  2. Heel raises: Stand facing the wall. Slowly raise the heels of your feet up and down, while keeping your toes on the floor. If you have trouble balancing, you can touch the wall with your hands. Repeat 10 times.  More advanced exercises  When you feel comfortable enough, try these exercises.  1. Kneeling lumbar extension: Begin on your hands and knees. At the same time, raise and straighten your right arm and left leg until they are parallel to the ground. Hold for 2 seconds and come back slowly to a starting position. Repeat with left arm and right leg, alternating 10 times.  2. Prone lumbar extension: Lie face down, arms extended overhead, palms on the floor. At the same time, raise your right arm and left leg as high as comfortably possible. Hold for 10 seconds and slowly return to start. Repeat with left arm and right leg, alternating 10 times. Gradually build up to 20 times. (Advanced: Repeat this exercise raising both arms and both legs a few inches off the floor at the same time. Hold for 5 seconds and release.)  3. Pelvic tilt: Lie on the floor on your back with your knees bent at 90 degrees. Your feet should be flat on the floor. Inhale, exhale, then slowly contract your abdominal muscles bringing your navel toward your spine. Let your pelvis rock back until your lower back is flat on the floor. Hold for 10 seconds while breathing smoothly.  4. Abdominal crunch: Perform a pelvic tilt (above) flattening your lower back against the floor. Holding the tension in your abdominal muscles, take another breath and raise your shoulder blades off the ground (this is not a full sit-up). Keep your head in line with your body (don t bend your neck forward). Hold for 2 seconds, then  slowly lower.  Date Last Reviewed: 6/1/2016 2000-2018 [employer]. [address] [place] [address], [place] [address]. All rights reserved. This information is not intended as a substitute for professional medical care. Always follow your healthcare professional's instructions.           Patient Education     Relieving Back Pain  Back pain is a common problem. You can strain back muscles by lifting too much weight or just by moving the wrong way. Back strain can be uncomfortable, even painful. And it can take weeks or months to improve. To help yourself feel better and prevent future back strains, try these tips.  Important: Don't give aspirin to children or teens without first discussing it with your child's healthcare provider.   Ice    Ice reduces muscle pain and swelling. It helps most during the first 24 to 48 hours after an injury.    Wrap an ice pack or a bag of frozen peas in a thin towel. Never put ice directly on your skin.    Place the ice where your back hurts the most.    Don t ice for more than 20 minutes at a time.    You can use ice several times a day.  Medicines  Over-the-counter pain relievers include acetaminophen and anti-inflammatory medicines, which includes aspirin, naproxen, or ibuprofen. They can help ease discomfort. Some also reduce swelling.    Tell your healthcare provider about any medicines you are already taking.    Take medicines only as directed.  Manipulation and massage  Having manipulation by an osteopathic doctor or chiropractor may be helpful. Getting a massage also may help.   Heat  After the first 48 hours, heat can relax sore muscles and improve blood flow.    Try a warm bath or shower. Or use a heating pad set on low. To prevent a burn, keep a cloth between you and the heating pad.    Don t use a heating pad for more than 15 minutes at a time. Never sleep on a heating pad.  Date Last Reviewed: 6/1/2018 2000-2018 [employer]. [address] [facility],  [place] [address]. All rights reserved. This information is not intended as a substitute for professional medical care. Always follow your healthcare professional's instructions.               [facility]

## 2019-01-07 NOTE — PATIENT INSTRUCTIONS
At [facility], we strive to deliver an exceptional experience to you, every time we see you.  If you receive a survey in the mail, please send us back your thoughts. We really do value your feedback.    Your care team:                            Family Medicine Internal Medicine   MD Alf Nunn MD Shantel Branch-Fleming, MD Katya Georgiev PA-C Megan Hill, APRN CNP    Cipriano Mitchell MD Pediatrics   Prakash Livingston, SAMANTHA Gonzalez, MD Skye Escobedo APRN CNP   MD Mecca Navarro MD Deborah Mielke, MD Pallavi Arboleda, APRN CNP      Clinic hours: Monday - Thursday 7 am-7 pm; Fridays 7 am-5 pm.   Urgent care: Monday - Friday 11 am-9 pm; Saturday and Sunday 9 am-5 pm.  Pharmacy : Monday -Thursday 8 am-8 pm; Friday 8 am-6 pm; Saturday and Sunday 9 am-5 pm.     Clinic: [phone]   Pharmacy: [phone]        Patient Education     Exercises to Strengthen Your Lower Back  Strong lower back and abdominal muscles work together to support your spine. The exercises below will help strengthen the lower back. It is important that you begin exercising slowly and increase levels gradually.  Always begin any exercise program with stretching. If you feel pain while doing any of these exercises, stop and talk to your doctor about a more specific exercise program that better suits your condition.   Low back stretch  The point of stretching is to make you more flexible and increase your range of motion. Stretch only as much as you are able. Stretch slowly. Do not push your stretch to the limit. If at any point you feel pain while stretching, this is your (temporary) limit.    Lie on your back with your knees bent and both feet on the ground.    Slowly raise your left knee to your chest as you flatten your lower back against the floor. Hold for 5 seconds.    Relax and repeat the exercise with your right knee.    Do 10 of these exercises for each  leg.    Repeat hugging both knees to your chest at the same time.  Building lower back strength  Start your exercise routine with 10 to 30 minutes a day, 1 to 3 times a day.  Initial exercises  Lying on your back:  1. Ankle pumps: Move your foot up and down, towards your head, and then away. Repeat 10 times with each foot.  2. Heel slides: Slowly bend your knee, drawing the heel of your foot towards you. Then slide your heel/foot from you, straightening your knee. Do not lift your foot off the floor (this is not a leg lift).  3. Abdominal contraction: Bend your knees and put your hands on your stomach. Tighten your stomach muscles. Hold for 5 seconds, then relax. Repeat 10 times.  4. Straight leg raise: Bend one leg at the knee and keep the other leg straight. Tighten your stomach muscles. Slowly lift your straight leg 6 to 12 inches off the floor and hold for up to 5 seconds. Repeat 10 times on each side.  Standin. Wall squats: Stand with your back against the wall. Move your feet about 12 inches away from the wall. Tighten your stomach muscles, and slowly bend your knees until they are at about a 45 degree angle. Do not go down too far. Hold about 5 seconds. Then slowly return to your starting position. Repeat 10 times.  2. Heel raises: Stand facing the wall. Slowly raise the heels of your feet up and down, while keeping your toes on the floor. If you have trouble balancing, you can touch the wall with your hands. Repeat 10 times.  More advanced exercises  When you feel comfortable enough, try these exercises.  1. Kneeling lumbar extension: Begin on your hands and knees. At the same time, raise and straighten your right arm and left leg until they are parallel to the ground. Hold for 2 seconds and come back slowly to a starting position. Repeat with left arm and right leg, alternating 10 times.  2. Prone lumbar extension: Lie face down, arms extended overhead, palms on the floor. At the same time, raise your  right arm and left leg as high as comfortably possible. Hold for 10 seconds and slowly return to start. Repeat with left arm and right leg, alternating 10 times. Gradually build up to 20 times. (Advanced: Repeat this exercise raising both arms and both legs a few inches off the floor at the same time. Hold for 5 seconds and release.)  3. Pelvic tilt: Lie on the floor on your back with your knees bent at 90 degrees. Your feet should be flat on the floor. Inhale, exhale, then slowly contract your abdominal muscles bringing your navel toward your spine. Let your pelvis rock back until your lower back is flat on the floor. Hold for 10 seconds while breathing smoothly.  4. Abdominal crunch: Perform a pelvic tilt (above) flattening your lower back against the floor. Holding the tension in your abdominal muscles, take another breath and raise your shoulder blades off the ground (this is not a full sit-up). Keep your head in line with your body (don t bend your neck forward). Hold for 2 seconds, then slowly lower.  Date Last Reviewed: 6/1/2016 2000-2018 [employer]. [address] [place] [address]. All rights reserved. This information is not intended as a substitute for professional medical care. Always follow your healthcare professional's instructions.           Patient Education     Relieving Back Pain  Back pain is a common problem. You can strain back muscles by lifting too much weight or just by moving the wrong way. Back strain can be uncomfortable, even painful. And it can take weeks or months to improve. To help yourself feel better and prevent future back strains, try these tips.  Important: Don't give aspirin to children or teens without first discussing it with your child's healthcare provider.   Ice    Ice reduces muscle pain and swelling. It helps most during the first 24 to 48 hours after an injury.    Wrap an ice pack or a bag of frozen peas in a thin towel. Never put ice directly  on your skin.    Place the ice where your back hurts the most.    Don t ice for more than 20 minutes at a time.    You can use ice several times a day.  Medicines  Over-the-counter pain relievers include acetaminophen and anti-inflammatory medicines, which includes aspirin, naproxen, or ibuprofen. They can help ease discomfort. Some also reduce swelling.    Tell your healthcare provider about any medicines you are already taking.    Take medicines only as directed.  Manipulation and massage  Having manipulation by an osteopathic doctor or chiropractor may be helpful. Getting a massage also may help.   Heat  After the first 48 hours, heat can relax sore muscles and improve blood flow.    Try a warm bath or shower. Or use a heating pad set on low. To prevent a burn, keep a cloth between you and the heating pad.    Don t use a heating pad for more than 15 minutes at a time. Never sleep on a heating pad.  Date Last Reviewed: 6/1/2018 2000-2018 [employer]. [address] [place] [address], [place] [address]. All rights reserved. This information is not intended as a substitute for professional medical care. Always follow your healthcare professional's instructions.

## 2019-01-30 ENCOUNTER — ANCILLARY PROCEDURE (OUTPATIENT)
Dept: GENERAL RADIOLOGY | Facility: CLINIC | Age: 39
End: 2019-01-30
Payer: COMMERCIAL

## 2019-01-30 ENCOUNTER — OFFICE VISIT (OUTPATIENT)
Dept: FAMILY MEDICINE | Facility: CLINIC | Age: 39
End: 2019-01-30
Payer: COMMERCIAL

## 2019-01-30 VITALS
WEIGHT: 145 LBS | BODY MASS INDEX: 23.3 KG/M2 | HEART RATE: 100 BPM | HEIGHT: 66 IN | SYSTOLIC BLOOD PRESSURE: 117 MMHG | OXYGEN SATURATION: 99 % | TEMPERATURE: 98.4 F | DIASTOLIC BLOOD PRESSURE: 77 MMHG | RESPIRATION RATE: 16 BRPM

## 2019-01-30 DIAGNOSIS — M54.42 ACUTE BILATERAL LOW BACK PAIN WITH LEFT-SIDED SCIATICA: Primary | ICD-10-CM

## 2019-01-30 DIAGNOSIS — M54.42 ACUTE BILATERAL LOW BACK PAIN WITH LEFT-SIDED SCIATICA: ICD-10-CM

## 2019-01-30 LAB
ALT SERPL W P-5'-P-CCNC: 29 U/L (ref 0–50)
AST SERPL W P-5'-P-CCNC: 16 U/L (ref 0–45)

## 2019-01-30 PROCEDURE — 36415 COLL VENOUS BLD VENIPUNCTURE: CPT | Performed by: NURSE PRACTITIONER

## 2019-01-30 PROCEDURE — 72100 X-RAY EXAM L-S SPINE 2/3 VWS: CPT

## 2019-01-30 PROCEDURE — 99214 OFFICE O/P EST MOD 30 MIN: CPT | Performed by: NURSE PRACTITIONER

## 2019-01-30 PROCEDURE — 84460 ALANINE AMINO (ALT) (SGPT): CPT | Performed by: NURSE PRACTITIONER

## 2019-01-30 PROCEDURE — 84450 TRANSFERASE (AST) (SGOT): CPT | Performed by: NURSE PRACTITIONER

## 2019-01-30 RX ORDER — PREDNISONE 20 MG/1
40 TABLET ORAL DAILY
Qty: 10 TABLET | Refills: 0 | Status: SHIPPED | OUTPATIENT
Start: 2019-01-30 | End: 2019-03-04

## 2019-01-30 ASSESSMENT — MIFFLIN-ST. JEOR: SCORE: 1354.47

## 2019-01-30 ASSESSMENT — PAIN SCALES - GENERAL: PAINLEVEL: MODERATE PAIN (4)

## 2019-01-30 NOTE — PATIENT INSTRUCTIONS
Patient Education     Caring for Your Back Throughout the Day  Take care of your back throughout the day. You will likely have fewer back problems if you do. Try to warm up before you move. Shift positions often. Also do your best to form healthy habits.    Warm up for the day  Do a few slow, catlike stretches before starting your day. This simple warmup can soften your disks, stretch your back muscles, and help prevent injuries.  Shift positions often  At work and at home, change positions often. This helps keep your body from getting stiff. Stand up or lean back while you sit. If you can, get up and move every 1/2 hour.  Form healthy habits  Here are some suggestions:     Keep a healthy weight. When you weigh too much, your back is under excess strain. But losing just a few extra pounds can help a lot.    Try not to overeat. Learn about serving sizes. The size of a serving depends on the food and the food group. Many foods list serving sizes on the labels.    Handle minor aches with cold and heat. Apply cold the first 24 to 48 hours. Use heat after that. Always place a thin cloth between your skin and the source of cold or heat.    Take medicines as directed. This helps keep pain under control. Always read labels, and call your healthcare provider or pharmacist if you have any questions.  Walk each day  A daily walk keeps your back and thigh muscles stretched and strong. This gives your back better support. Be sure to walk with your spine s three curves aligned, by keeping your head, hips, and toes connected by a vertical line.   Date Last Reviewed: 5/1/2018 2000-2018 [employer]. [address] [facility], [place] [address]. All rights reserved. This information is not intended as a substitute for professional medical care. Always follow your healthcare professional's instructions.

## 2019-01-30 NOTE — PROGRESS NOTES
SUBJECTIVE:   Jaylin Day is a 38 year old female who presents to clinic today for the following health issues:      Chronic Pain Follow-Up       Type / Location of Pain: back pain, L side sciatic pain  Analgesia/pain control:       Recent changes:  worse      Overall control: not controlled in PM, tolerable during day  Activity level/function:      Daily activities:  Able to do all daily activities    Work:  Pain does not limit any work  Adverse effects:  No  Adherance    Taking medication as directed?  Yes    Participating in other treatments: NA - hasn't scheduled PT  Risk Factors:    Sleep:  Poor    Mood/anxiety:  worsened    Recent family or social stressors:  Grandfather death    Other aggravating factors: none     Patient was seen 1/7/19 for same, given Flexeril and Ibuprofen 80 mg every 8 hours which she has continued without improvement.  She is a  and does a lot of bending, squatting, lifting.  PMH significant for LS decompression and fusion in 2016.She is irritable, not sleeping well due to pain.  She's had steroid injections X 2 neither of  Which helped for more than about a week.    PHQ-9 SCORE 6/24/2018   PHQ-9 Total Score 3     HARLEEN-7 SCORE 6/24/2018   Total Score 4     Encounter-Level CSA:    There are no encounter-level csa.     Patient-Level CSA:    There are no patient-level csa.            Amount of exercise or physical activity: None    Problems taking medications regularly: No    Medication side effects: none    Diet: regular (no restrictions)      Problem list and histories reviewed & adjusted, as indicated.  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     DUB (dysfunctional uterine bleeding)     S/P lumbar spine operation     Bilateral low back pain without sciatica, unspecified chronicity     History of lumbar surgery      Family history of thyroid disorder     Bilateral low back pain with left-sided sciatica     History of recurrent UTI (urinary tract infection)     Irregular menses     Migraine headache     Varicose vein of leg     Past Surgical History:   Procedure Laterality Date     DECOMPRESSION, FUSION LUMBAR POSTERIOR THREE + LEVELS, COMBINED         Social History     Tobacco Use     Smoking status: Former Smoker     Types: Cigarettes     Last attempt to quit: 2006     Years since quittin.0     Smokeless tobacco: Never Used   Substance Use Topics     Alcohol use: Yes     Comment: rare     Family History   Problem Relation Age of Onset     Hypertension Maternal Grandmother      Thyroid Disease Son         hypothyroidism, congenital     Hypertension Maternal Uncle      C.A.D. No family hx of      Diabetes No family hx of          Current Outpatient Medications   Medication Sig Dispense Refill     cyclobenzaprine (FLEXERIL) 10 MG tablet Take 1 tablet (10 mg) by mouth 3 times daily as needed for muscle spasms 15 tablet 1     MULTIVITAMIN TABS   OR 1 TABLET DAILY       nitroFURantoin macrocrystal-monohydrate (MACROBID) 100 MG capsule TAKE ONE CAPSULE BY MOUTH AFTER INTERCOURSE AS NEEDED 90 capsule 0     predniSONE (DELTASONE) 20 MG tablet Take 40 mg by mouth daily for 5 days. 10 tablet 0     BP Readings from Last 3 Encounters:   19 117/77   19 120/83   10/05/18 126/78    Wt Readings from Last 3 Encounters:   19 65.8 kg (145 lb)   19 64.5 kg (142 lb 3.2 oz)   10/05/18 67.3 kg (148 lb 6.4 oz)                    Reviewed and updated as needed this visit by clinical staff  Tobacco  Allergies  Meds  Problems  Med Hx  Surg Hx  Fam Hx       Reviewed and updated as needed this visit by Provider  Tobacco  Allergies  Meds  Problems  Med Hx  Surg Hx  Fam Hx         ROS:  Constitutional, HEENT, cardiovascular, pulmonary, gi and gu systems are negative, except as otherwise noted.    OBJECTIVE:  "    /77 (BP Location: Right arm, Patient Position: Sitting, Cuff Size: Adult Regular)   Pulse 100   Temp 98.4  F (36.9  C) (Oral)   Resp 16   Ht 1.676 m (5' 6\")   Wt 65.8 kg (145 lb)   LMP 01/17/2019 (Exact Date)   SpO2 99%   BMI 23.40 kg/m    Body mass index is 23.4 kg/m .  GENERAL: healthy, alert and no distress  EYES: Eyes grossly normal to inspection, PERRL and conjunctivae and sclerae normal  HENT: ear canals and TM's normal, nose and mouth without ulcers or lesions  NECK: no adenopathy, no asymmetry, masses, or scars and thyroid normal to palpation  RESP: lungs clear to auscultation - no rales, rhonchi or wheezes  CV: regular rate and rhythm, normal S1 S2, no S3 or S4, no murmur, click or rub, no peripheral edema and peripheral pulses strong  ABDOMEN: soft, nontender, no hepatosplenomegaly, no masses and bowel sounds normal  MS: no gross musculoskeletal defects noted, no edema  SKIN: no suspicious lesions or rashes  Comprehensive back pain exam:  Tenderness of left SI region, Pain limits the following motions: flexion and extension primarily, Lower extremity strength functional and equal on both sides, Lower extremity reflexes within normal limits bilaterally, Lower extremity sensation normal and equal on both sides and Straight leg positive on  left, indicating possible ipsilateral radiculopathy  PSYCH: mentation appears normal, affect normal/bright  LYMPH: normal ant/post cervical, supraclavicular nodes    Diagnostic Test Results:  No results found for this or any previous visit (from the past 24 hour(s)).  Xray -normal alignent, disc space narrowing at L4-5, no acute fracture by my independent read.  AWwit formal read by Radiology.  Order   XR Lumbar Spine 2/3 Views [[id number]] (Order [id number])   Exam Information     Exam Date Exam Time Accession # Performing Department Results    1/30/19  2:19 PM [id number] [facility]    PACS Images      Show images for XR Lumbar " "Spine 2/3 Views   Study Result     LUMBAR SPINE TWO VIEWS   1/30/2019 2:19 PM     HISTORY: Low back pain with left radiculopathy, history of LS  decompression, fusion. Acute bilateral low back pain with left-sided  sciatica.     COMPARISON: None.     FINDINGS: Vertebral body alignment is normal with no fracture or  osseous lesion seen. There is moderately prominent disc height loss  anteriorly at L4-L5. Remaining disc heights are well-preserved. No  other abnormality is seen.                                                                       IMPRESSION: Degenerative disc disease at L4-L5.     ASIM ESTEBAN MD       ASSESSMENT/PLAN:         BMI:   Estimated body mass index is 23.4 kg/m  as calculated from the following:    Height as of this encounter: 1.676 m (5' 6\").    Weight as of this encounter: 65.8 kg (145 lb).         1. Acute bilateral low back pain with left-sided sciatica  Starting Prednisone burst, she is to continue on  Ibuprofen and flexeril which she is tolerating well, referring to physical therapy.  If she continues to be symptomatic, will refer her back to Orthopedics for evaluation for another steroid injection- she is hesitant to do this again as steroid injection did not help in the past.  She has not had a steroid injection since she had surgery.  - XR Lumbar Spine 2/3 Views; Future  - predniSONE (DELTASONE) 20 MG tablet; Take 40 mg by mouth daily for 5 days.  Dispense: 10 tablet; Refill: 0  - [facility], AND [facility]IROPRACTIC REFERRAL; Future  - AST  - ALT    Regular exercise  See Patient Instructions    BROWN [facility]  "

## 2019-02-03 ENCOUNTER — MYC MEDICAL ADVICE (OUTPATIENT)
Dept: FAMILY MEDICINE | Facility: CLINIC | Age: 39
End: 2019-02-03

## 2019-02-03 DIAGNOSIS — M54.42 ACUTE BILATERAL LOW BACK PAIN WITH LEFT-SIDED SCIATICA: Primary | ICD-10-CM

## 2019-02-04 RX ORDER — TRAMADOL HYDROCHLORIDE 50 MG/1
50 TABLET ORAL EVERY 6 HOURS PRN
Qty: 10 TABLET | Refills: 0 | Status: SHIPPED | OUTPATIENT
Start: 2019-02-04 | End: 2019-03-04

## 2019-02-04 NOTE — TELEPHONE ENCOUNTER
Rosendo Rosa,    We can use some Tramadol for the pain for now but it si not something I want to use long term.  You can continue with the Muscle relaxer at bedtime as well.  You still need to see physical therapy and if you're still in severe pain, it probably won't help to be seen any sooner than the 11th.    Keep me posted.  I put in for some Tramadol for you at [facility] on [address].    Maite DASILVA, CNP

## 2019-02-05 NOTE — TELEPHONE ENCOUNTER
Faxed Rx for the Ultram to [facility] [place], [phone], right fax confirmed at 9:20 am today, 2/5/19.  Adia Rangel MA/  For [employer] and [employer]

## 2019-02-08 ENCOUNTER — MYC MEDICAL ADVICE (OUTPATIENT)
Dept: FAMILY MEDICINE | Facility: CLINIC | Age: 39
End: 2019-02-08

## 2019-02-08 NOTE — TELEPHONE ENCOUNTER
Per OV notes on 1/30/19 plan would be to refer to Ortho if symptoms persist.   Routing to provider to review and advise.   Mackenzie Colunga RN

## 2019-02-08 NOTE — TELEPHONE ENCOUNTER
Since it has been over a week since I saw her, she does need to set up a phone or E visit for this.  Maite DASILVA, CNP

## 2019-02-11 ENCOUNTER — E-VISIT (OUTPATIENT)
Dept: FAMILY MEDICINE | Facility: CLINIC | Age: 39
End: 2019-02-11
Payer: COMMERCIAL

## 2019-02-11 ENCOUNTER — THERAPY VISIT (OUTPATIENT)
Dept: PHYSICAL THERAPY | Facility: CLINIC | Age: 39
End: 2019-02-11
Payer: COMMERCIAL

## 2019-02-11 DIAGNOSIS — M54.42 ACUTE BILATERAL LOW BACK PAIN WITH LEFT-SIDED SCIATICA: ICD-10-CM

## 2019-02-11 DIAGNOSIS — M54.16 LUMBAR BACK PAIN WITH RADICULOPATHY AFFECTING LEFT LOWER EXTREMITY: ICD-10-CM

## 2019-02-11 DIAGNOSIS — M54.42 ACUTE BILATERAL LOW BACK PAIN WITH LEFT-SIDED SCIATICA: Primary | ICD-10-CM

## 2019-02-11 PROCEDURE — 97110 THERAPEUTIC EXERCISES: CPT | Mod: GP | Performed by: PHYSICAL THERAPIST

## 2019-02-11 PROCEDURE — 99444 ZZC PHYSICIAN ONLINE EVALUATION & MANAGEMENT SERVICE: CPT | Performed by: NURSE PRACTITIONER

## 2019-02-11 PROCEDURE — 97162 PT EVAL MOD COMPLEX 30 MIN: CPT | Mod: GP | Performed by: PHYSICAL THERAPIST

## 2019-02-11 NOTE — PROGRESS NOTES
[facility] Initial Evaluation -- Lumbar    Date: February 11, 2019  Jaylin Day is a 38 year old female with a low back condition.   Referral: PRAFUL Arboleda, primary care  Work mechanical stresses:    Employment status:  Working full time  Leisure mechanical stresses:   Functional disability score (MIMI/STarT Back):  44%/High (7)  VAS score (0-10): 5/10 L LE  Patient goals/expectations:  Alleviate L LE and back symptoms to allow patient to sleep through the night, sit, stand, walk, etc; return to previous level of activity without L LE nor lower back pain.    HISTORY:    Present symptoms: ache for the lower back with sharp shooting pain out to the lateral L hip down the lateral L LE into the lateral and anterior lower leg, dorsum of the foot into all her toes.  L LE symptoms are more intense than the lower back symptoms   Paresthesia (yes/no):  yes    Present since (onset date): 12/24/2018.     Symptoms (improving/unchanging/worsening):  worsening.     Symptoms commenced as a result of: unknown   Condition occurred in the following environment:   home     Symptoms at onset (back/thigh/leg): lower back  Constant symptoms (back/thigh/leg): lower back < L LE to foot  Intermittent symptoms (back/thigh/leg): none    Symptoms are made worse with the following: Always Bending, Sometimes Sitting (30 minute tolerance), Sometimes Standing (keeps weight off the L LE when standing), Sometimes Walking(walking short distances due to L LE symptoms), Always Lying (L side lying, supine), Time of day - Always PM, Always On the move and Other - Lifting   Symptoms are made better with the following: Other - felt a little better the last day of 5 when taking an oral steroid, one hour after taking an 800 mg dose of Ibuprofen, working from a squatting position; relief is minimal and short lived    Disturbed sleep (yes/no):  yes Sleeping postures (R/L): R Side ly on the couch due to her lower back    Previous  episodes (0/1-5/6-10/11+): 2 Year of first episode: 2016    Previous history: lumbar decompression in about 2016. Had PT in 2017 due to her back and L LE (helpful).  Previous treatments: for this recent episode - medication.  In the past has had CARMEN without relief.       Specific Questions:  Cough/Sneeze/Strain (pos/neg): positive  Bowel/Bladder (normal/abnormal): normal  Gait (normal/abnormal): normal  Medications (nil/NSAIDS/analg/steroids/anticoag/other):  NSAIDS, OTC analgesic and Narcotics/Opioids  Medical allergies:  methocarbamol  General health (excellent/good/fair/poor):  good  Pertinent medical history:  None  Imaging (None/Xray/MRI/Other):  X-rays - disc degeneration L4-5  Recent or major surgery (yes/no):  Lumbar decompression September 2016  Night pain (yes/no): wakes due to L LE symptoms (4-5 hours of sleep max a night)  Accidents (yes/no): no  Unexplained weight loss (yes/no): no  Barriers at home: no  Other red flags: no    EXAMINATION    Posture:   Sitting (good/fair/poor): good  Standing (good/fair/poor):good  Lordosis (red/acc/normal): normal/reduced  Correction of posture (better/worse/no effect): worse    Lateral Shift (right/left/nil): nil  Relevant (yes/no):  nil  Other Observations: none    Neurological:    Motor deficit:  none  Reflexes:  Symmetrical B LE  Sensory deficit:  Symmetrical to light touch  Dural signs:  (+) well leg SLR     Movement Loss:   Giorgio Mod Min Nil Pain   Flexion  x x  Finger tips to patella - pulling L thigh   Extension x    Shooting pain down L LE   Side Gliding R  x   Shooting pain down L LE   Side Gliding L  x   Shooting pain down L LE     Test Movements:   During: produces, abolishes, increases, decreases, no effect, centralizing, peripheralizing   After: better, worse, no better, no worse, no effect, centralized, peripheralized    Pretest symptoms standing: not tested in standing today   Symptoms During Symptoms After ROM increased ROM decreased No Effect   FIS       "    Rep FIS          EIS          Rep EIS          Pretest symptoms lyin/10 left LE to dorsum of L foot    Symptoms During Symptoms After ROM increased ROM decreased No Effect   JANET Increases    No Worse         Rep JANET Increases foot    Worse         EIL          Rep EIL          If required, pretest symptoms: 5-/10 L LE   Symptoms During Symptoms After ROM increased ROM decreased No Effect   SGIS - R          Rep SGIS - R          SGIS - L  With mild lumbar flexion Increases  PDM     No Worse         Rep SGIS - L with mild lumbar flexion Increases  PDM    No Worse      NE on tension sign     Static Tests:  Sitting slouched:  NT  Sitting erect:  NT  Standing slouched NT  Standing erect:  NT  Lying prone in extension:  See below Long sitting:  NT    Other Tests: progressive extension - began 2 pillows under abdomen/hips and with time increased to prone lying in sustained lumbar extension over 10'  No lasting relief upon standing but had improved LROM.  Prone lying with 1 pillow under abdomen with L LE in an abd and ER position (\"road kill position\") 3'  - this position felt the best    Provisional Classification:  Inconclusive/Other - Inflammatory and Mechanically Unresponsive Radiculopathy    Principle of Management:  Education:  Discussed taking time for positioning.  Do not rush.  If symptoms are resolving then continue until no longer changing. Discussed centralization and peripheralization.  Discussed possible inflammatory component as not able to alleviate symptoms with prone, just decreased but did not remain better.  Can monitor EIS and seated SLR for baseline measures.   Equipment provided:  none  Mechanical therapy (Y/N):  yes   Extension principle:  Progressive prone lying with L LE in a externally rotated and abducted position - as long as needed, about 6-8 times a day.  When not able to lay down then L SGIS with slight trunk flexion (work to increase weight bearing on the L LE).    Lateral " Principle:  See above  Flexion principle:  no      ASSESSMENT/PLAN:    Patient is a 38 year old female with lumbar complaints.    Patient has the following significant findings with corresponding treatment plan.                Diagnosis 1:  Low back pain with L LE sciatica    Pain -  manual therapy, self management, education, directional preference exercise, home program and ice as needed  Decreased ROM/flexibility - manual therapy, therapeutic exercise and home program  Decreased joint mobility - manual therapy, therapeutic exercise and home program  Inflammation - cold therapy, self management/home program  Decreased function - therapeutic activities and home program    Therapy Evaluation Codes:   1) History comprised of:   Personal factors that impact the plan of care:      Past/current experiences.    Comorbidity factors that impact the plan of care are:      previous lower back pain/surgery.     Medications impacting care: Anti-inflammatory and Pain.  2) Examination of Body Systems comprised of:   Body structures and functions that impact the plan of care:      Lumbar spine.   Activity limitations that impact the plan of care are:      Bathing, Bending, Cooking, Driving, Dressing, Lifting, Running, Sitting, Standing, Walking, Working, Sleeping and Laying down.  3) Clinical presentation characteristics are:   Evolving/Changing.  4) Decision-Making    Moderate complexity using standardized patient assessment instrument and/or measureable assessment of functional outcome.  Cumulative Therapy Evaluation is: Moderate complexity.    Previous and current functional limitations:  (See Goal Flow Sheet for this information)    Short term and Long term goals: (See Goal Flow Sheet for this information)     Communication ability:  Patient appears to be able to clearly communicate and understand verbal and written communication and follow directions correctly.  Treatment Explanation - The following has been discussed with  the patient:   RX ordered/plan of care  Anticipated outcomes  Possible risks and side effects  This patient would benefit from PT intervention to resume normal activities.   Rehab potential is good.    Frequency:  1 X week, once daily  Duration:  for 6-12 visits  Discharge Plan:  Achieve all LTG.  Independent in home treatment program.  Reach maximal therapeutic benefit.    Please refer to the daily flowsheet for treatment today, total treatment time and time spent performing 1:1 timed codes.

## 2019-02-12 ENCOUNTER — MYC MEDICAL ADVICE (OUTPATIENT)
Dept: FAMILY MEDICINE | Facility: CLINIC | Age: 39
End: 2019-02-12

## 2019-02-12 DIAGNOSIS — M54.42 ACUTE BILATERAL LOW BACK PAIN WITH LEFT-SIDED SCIATICA: ICD-10-CM

## 2019-02-12 RX ORDER — TRAMADOL HYDROCHLORIDE 50 MG/1
50 TABLET ORAL EVERY 6 HOURS PRN
Qty: 15 TABLET | Refills: 0 | Status: ON HOLD | OUTPATIENT
Start: 2019-02-12 | End: 2019-04-02

## 2019-02-12 RX ORDER — MELOXICAM 15 MG/1
15 TABLET ORAL DAILY
Qty: 90 TABLET | Refills: 1 | Status: ON HOLD | OUTPATIENT
Start: 2019-02-12 | End: 2019-04-02

## 2019-02-13 RX ORDER — CYCLOBENZAPRINE HCL 10 MG
10 TABLET ORAL 3 TIMES DAILY PRN
Qty: 15 TABLET | Refills: 1 | Status: SHIPPED | OUTPATIENT
Start: 2019-02-13 | End: 2019-03-18

## 2019-02-13 NOTE — TELEPHONE ENCOUNTER
Faxed Rx for the Ultram to [facility] [place], [phone], right fax confirmed at 10:14 am today, 2/13/19.  Adia Rangel MA/  For [employer] and Janel

## 2019-02-18 ENCOUNTER — THERAPY VISIT (OUTPATIENT)
Dept: PHYSICAL THERAPY | Facility: CLINIC | Age: 39
End: 2019-02-18
Payer: COMMERCIAL

## 2019-02-18 DIAGNOSIS — M54.16 LUMBAR BACK PAIN WITH RADICULOPATHY AFFECTING LEFT LOWER EXTREMITY: ICD-10-CM

## 2019-02-18 PROCEDURE — 97110 THERAPEUTIC EXERCISES: CPT | Mod: GP | Performed by: PHYSICAL THERAPIST

## 2019-02-27 ENCOUNTER — THERAPY VISIT (OUTPATIENT)
Dept: PHYSICAL THERAPY | Facility: CLINIC | Age: 39
End: 2019-02-27
Payer: COMMERCIAL

## 2019-02-27 DIAGNOSIS — M54.16 LUMBAR BACK PAIN WITH RADICULOPATHY AFFECTING LEFT LOWER EXTREMITY: ICD-10-CM

## 2019-02-27 PROCEDURE — 97110 THERAPEUTIC EXERCISES: CPT | Mod: GP | Performed by: PHYSICAL THERAPY ASSISTANT

## 2019-02-28 ENCOUNTER — MYC MEDICAL ADVICE (OUTPATIENT)
Dept: FAMILY MEDICINE | Facility: CLINIC | Age: 39
End: 2019-02-28

## 2019-03-03 ENCOUNTER — OFFICE VISIT (OUTPATIENT)
Dept: URGENT CARE | Facility: URGENT CARE | Age: 39
End: 2019-03-03
Payer: COMMERCIAL

## 2019-03-03 VITALS
SYSTOLIC BLOOD PRESSURE: 123 MMHG | TEMPERATURE: 97.9 F | HEART RATE: 107 BPM | DIASTOLIC BLOOD PRESSURE: 84 MMHG | BODY MASS INDEX: 22.68 KG/M2 | WEIGHT: 140.5 LBS | OXYGEN SATURATION: 100 %

## 2019-03-03 DIAGNOSIS — M54.50 ACUTE BILATERAL LOW BACK PAIN WITHOUT SCIATICA: Primary | ICD-10-CM

## 2019-03-03 PROCEDURE — 99213 OFFICE O/P EST LOW 20 MIN: CPT | Performed by: NURSE PRACTITIONER

## 2019-03-03 ASSESSMENT — ENCOUNTER SYMPTOMS
NAUSEA: 0
FEVER: 0
HEADACHES: 0
SORE THROAT: 0
SHORTNESS OF BREATH: 0
CHILLS: 0
DIARRHEA: 0
VOMITING: 0
BACK PAIN: 1
COUGH: 0
RHINORRHEA: 0

## 2019-03-03 NOTE — PROGRESS NOTES
SUBJECTIVE:   Jaylin Day is a 38 year old female presenting with a chief complaint of   Chief Complaint   Patient presents with     Back Pain     Since December 24th, has been seeing Dr. Arboleda for this, sent in a refill request on 3/1 for meds Tramadol 50mg, has been doing PT for three weeks, last night had excruciating pain and would like something done today       She is an established patient of [facility].    Back Pain    Onset of symptoms was 3 month(s) ago and ongoing. Worse last night  Location: bilateral low back  Radiation: does not radiate  Course of symptoms is worsening.    Severity moderate  Current and Associated symptoms: pain and stiffness  Denies: fecal incontinence, urinary incontinence, lower extremity numbness, lower extremity weakness and paresthesia    Aggravating Factors: walking and changing position  Therapies to improve symptoms include: physical therapy and Tramadol, ibuprofen  Past history: prior back problems and previous degenerative joint disease of the cervical spine    Review of Systems   Constitutional: Negative for chills and fever.   HENT: Negative for congestion, ear pain, rhinorrhea and sore throat.    Respiratory: Negative for cough and shortness of breath.    Gastrointestinal: Negative for diarrhea, nausea and vomiting.   Musculoskeletal: Positive for back pain.   Neurological: Negative for headaches.   All other systems reviewed and are negative.      Past Medical History:   Diagnosis Date     Female pelvic pain 2008    possible mild bicornuate or septate uterus     Migraines 2004    resolved      Preeclampsia      Recurrent UTI      Family History   Problem Relation Age of Onset     Hypertension Maternal Grandmother      Thyroid Disease Son         hypothyroidism, congenital     Hypertension Maternal Uncle      C.A.D. No family hx of      Diabetes No family hx of      Current Outpatient Medications   Medication Sig Dispense Refill     cyclobenzaprine (FLEXERIL) 10 MG  tablet Take 1 tablet (10 mg) by mouth 3 times daily as needed for muscle spasms 15 tablet 1     MULTIVITAMIN TABS   OR 1 TABLET DAILY       traMADol (ULTRAM) 50 MG tablet Take 1 tablet (50 mg) by mouth every 6 hours as needed for severe pain 15 tablet 0     meloxicam (MOBIC) 15 MG tablet Take 1 tablet (15 mg) by mouth daily (Patient not taking: Reported on 3/3/2019) 90 tablet 1     nitroFURantoin macrocrystal-monohydrate (MACROBID) 100 MG capsule TAKE ONE CAPSULE BY MOUTH AFTER INTERCOURSE AS NEEDED (Patient not taking: Reported on 3/3/2019) 90 capsule 0     Social History     Tobacco Use     Smoking status: Former Smoker     Types: Cigarettes     Last attempt to quit: 2006     Years since quittin.1     Smokeless tobacco: Never Used   Substance Use Topics     Alcohol use: Yes     Comment: rare       OBJECTIVE  /84 (BP Location: Left arm, Patient Position: Chair, Cuff Size: Adult Regular)   Pulse 107   Temp 97.9  F (36.6  C) (Oral)   Wt 63.7 kg (140 lb 8 oz)   SpO2 100%   Breastfeeding? No   BMI 22.68 kg/m      Physical Exam   Constitutional: No distress.   HENT:   Head: Normocephalic and atraumatic.   Right Ear: Tympanic membrane and external ear normal.   Left Ear: Tympanic membrane and external ear normal.   Mouth/Throat: Oropharynx is clear and moist.   Eyes: EOM are normal. Pupils are equal, round, and reactive to light.   Neck: Normal range of motion. Neck supple.   Pulmonary/Chest: Effort normal and breath sounds normal. No respiratory distress.   Musculoskeletal:   Lumbosacral spine area reveals no focal tenderness or mass.  Normal lumbosacral range of motion. Straight leg raise is negative.  Neuro: DTR's, motor strength and sensation normal.     Lymphadenopathy:     She has no cervical adenopathy.   Neurological: She is alert. No cranial nerve deficit.   Skin: Skin is warm and dry. She is not diaphoretic.   Psychiatric: She has a normal mood and affect.   Nursing note and vitals  reviewed.        ASSESSMENT:      ICD-10-CM    1. Acute bilateral low back pain without sciatica M54.5           Differential Diagnosis:  Back Pain: lumbosacral strain, degenerative disc disease, possible herniated disc  and acute sciatica    Serious Comorbid Conditions:  Adult:  None    PLAN:  The patient would like to have tramadol for pain because she will took the last dose at 3 AM this morning which is 7 hours ago. I have advised to use Toradol injection and a muscle relaxant for pain but the  would like a narcotic state.  I have advised to use ibuprofen and call her PCP in the morning.  The  again interjected and said he prefers to go with patient r to the emergency room for pain medicine.

## 2019-03-04 ENCOUNTER — OFFICE VISIT (OUTPATIENT)
Dept: PEDIATRICS | Facility: CLINIC | Age: 39
End: 2019-03-04
Payer: COMMERCIAL

## 2019-03-04 VITALS
TEMPERATURE: 98.3 F | SYSTOLIC BLOOD PRESSURE: 115 MMHG | DIASTOLIC BLOOD PRESSURE: 82 MMHG | OXYGEN SATURATION: 99 % | HEART RATE: 103 BPM | BODY MASS INDEX: 22.77 KG/M2 | WEIGHT: 141.1 LBS

## 2019-03-04 DIAGNOSIS — Z98.890 S/P LUMBAR SPINE OPERATION: ICD-10-CM

## 2019-03-04 DIAGNOSIS — M54.16 LUMBAR BACK PAIN WITH RADICULOPATHY AFFECTING LEFT LOWER EXTREMITY: Primary | ICD-10-CM

## 2019-03-04 PROCEDURE — 99214 OFFICE O/P EST MOD 30 MIN: CPT | Performed by: FAMILY MEDICINE

## 2019-03-04 RX ORDER — PREDNISONE 20 MG/1
40 TABLET ORAL DAILY
Qty: 10 TABLET | Refills: 0 | Status: SHIPPED | OUTPATIENT
Start: 2019-03-04 | End: 2019-03-08

## 2019-03-04 ASSESSMENT — PAIN SCALES - GENERAL: PAINLEVEL: MODERATE PAIN (5)

## 2019-03-04 NOTE — PATIENT INSTRUCTIONS
Start on prednisone for 5 days    Schedule for lumbar MRI (make sure to check with your insurance for coverage)

## 2019-03-04 NOTE — PROGRESS NOTES
SUBJECTIVE:   Jaylin Day is a 38 year old female who presents to clinic today for the following health issues:    Patient with past medical history significant for previous lumbar spinal fusion and decompression in 2017 is here with concerns for progressively worsening pain in the left leg radiating from the lower back for the past 8-9 weeks, associated with some tingling and numbness but no weakness of the left lower extremity, right-sided symptoms, new onset of bladder or bowel incontinence.    Patient denies previous history of fall or trauma.    Onset of pain was acute, happened the night before [place]  Patient was seen at the [facility] urgent [facility], was given oral prednisone and recommendation to start on physical therapy.  Patient has completed more than 1 month of PT with no relief of symptoms.  Denies abnormal skin rashes.  Patient works in pet grooming, had to stay off of work due to ongoing pain  Flexeril did not relieve the muscle spasm, stopped taking it due to drowsy feeling.  Has been taking ibuprofen which brings her pain level down a little bit but did not alleviate the pain    Left Leg Pain related to Back Pain   Patient has previously seen Maite Arboleda CNP for this. She is currently on her 4th week of PT but no improvement. Patient reports pain was severe over the weekend so she went to the [facility]. Patient would like to discuss getting an MRI or next steps.      Duration: 12/24        Specific cause: none    Description:   Location of pain: low back left  Character of pain: sharp, dull ache, stabbing, gnawing, burning, fullness, cramping and constant  Pain radiation:radiates into the left leg  New numbness or weakness in legs, not attributed to pain:  YES    Intensity: Currently 5/10    History:   Pain interferes with job: YES, currently off of work due to ongoing pain  History of back problems: previous spinal decompression at [facility] 2017  Any previous  MRI or X-rays: Yes- at [facility].  Date 2019  Sees a specialist for back pain:  No  Therapies tried without relief: acetaminophen (Tylenol) and Physical Therapy    Alleviating factors:   Improved by: NSAIDs, takes the edge off    Precipitating factors:  Worsened by: Lifting, Bending and Walking          Accompanying Signs & Symptoms:  Risk of Fracture:  None  Risk of Cauda Equina:  None  Risk of Infection:  None  Risk of Cancer:  None  Risk of Ankylosing Spondylitis:  Onset at age <35, male, AND morning back stiffness. no                       Problem list and histories reviewed & adjusted, as indicated.  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     DUB (dysfunctional uterine bleeding)     S/P lumbar spine operation     Bilateral low back pain without sciatica, unspecified chronicity     History of lumbar surgery     Family history of thyroid disorder     Bilateral low back pain with left-sided sciatica     History of recurrent UTI (urinary tract infection)     Irregular menses     Migraine headache     Varicose vein of leg     Lumbar back pain with radiculopathy affecting left lower extremity     Past Surgical History:   Procedure Laterality Date     DECOMPRESSION, FUSION LUMBAR POSTERIOR THREE + LEVELS, COMBINED         Social History     Tobacco Use     Smoking status: Former Smoker     Types: Cigarettes     Last attempt to quit: 2006     Years since quittin.1     Smokeless tobacco: Never Used   Substance Use Topics     Alcohol use: Yes     Comment: rare     Family History   Problem Relation Age of Onset     Hypertension Maternal Grandmother      Thyroid Disease Son         hypothyroidism, congenital     Hypertension Maternal Uncle      C.A.D. No family hx of      Diabetes No family hx of          Current Outpatient Medications   Medication Sig  Dispense Refill     MULTIVITAMIN TABS   OR 1 TABLET DAILY       predniSONE (DELTASONE) 20 MG tablet Take 40 mg by mouth daily for 5 days. 10 tablet 0     cyclobenzaprine (FLEXERIL) 10 MG tablet Take 1 tablet (10 mg) by mouth 3 times daily as needed for muscle spasms 15 tablet 1     meloxicam (MOBIC) 15 MG tablet Take 1 tablet (15 mg) by mouth daily (Patient not taking: Reported on 3/3/2019) 90 tablet 1     nitroFURantoin macrocrystal-monohydrate (MACROBID) 100 MG capsule TAKE ONE CAPSULE BY MOUTH AFTER INTERCOURSE AS NEEDED (Patient not taking: Reported on 3/3/2019) 90 capsule 0     traMADol (ULTRAM) 50 MG tablet Take 1 tablet (50 mg) by mouth every 6 hours as needed for severe pain (Patient not taking: Reported on 3/4/2019) 15 tablet 0     Allergies   Allergen Reactions     Methocarbamol Hives     Recent Labs   Lab Test 01/30/19  1409 02/21/18  1005 04/27/12  0825 10/29/11  0940   LDL  --  103* 97  --    HDL  --  69 66  --    TRIG  --  83 84  --    ALT 29 21  --  27   CR  --  0.61  --  0.73   GFRESTIMATED  --  >90  --  >90   GFRESTBLACK  --  >90  --  >90   POTASSIUM  --  4.2  --  4.3   TSH  --  1.46 1.51  --       BP Readings from Last 3 Encounters:   03/04/19 115/82   03/03/19 123/84   01/30/19 117/77    Wt Readings from Last 3 Encounters:   03/04/19 64 kg (141 lb 1.6 oz)   03/03/19 63.7 kg (140 lb 8 oz)   01/30/19 65.8 kg (145 lb)                  Labs reviewed in EPIC    Reviewed and updated as needed this visit by clinical staff       Reviewed and updated as needed this visit by Provider         ROS:  CONSTITUTIONAL: NEGATIVE for fever, chills, change in weight  INTEGUMENTARY/SKIN: NEGATIVE for worrisome rashes, moles or lesions  GI: NEGATIVE for nausea, abdominal pain, heartburn, or change in bowel habits  : Negative for urinary incontinence  MUSCULOSKELETAL: as above  NEURO: as above  PSYCHIATRIC: NEGATIVE for changes in mood or affect    OBJECTIVE:     /82 (BP Location: Right arm, Patient Position:  Sitting, Cuff Size: Adult Regular)   Pulse 103   Temp 98.3  F (36.8  C) (Oral)   Wt 64 kg (141 lb 1.6 oz)   SpO2 99%   BMI 22.77 kg/m    Body mass index is 22.77 kg/m .  GENERAL: healthy, alert and no distress  MS: Antalgic gait.  SKIN: no suspicious lesions or rashes  NEURO: Normal strength and tone, mentation intact and speech normal  BACK: no CVA tenderness, no paralumbar tenderness  Comprehensive back pain exam:  Tenderness of Bilateral paralumbar muscles and sacral iliac joints, Pain limits the following motions: Flexion, extension, lateral rotation, Lower extremity strength functional and equal on both sides, Lower extremity reflexes within normal limits bilaterally, Lower extremity sensation normal and equal on both sides and Straight leg positive on  left, indicating possible ipsilateral radiculopathy  PSYCH: mentation appears normal, affect normal/bright    Diagnostic Test Results:  none     ASSESSMENT/PLAN:             1. Lumbar back pain with radiculopathy affecting left lower extremity  Worsening radicular apathy symptoms despite trial of conservative treatment in the past 2 months including physical therapy and medications  Recommended to schedule for lumbar MRI for further evaluation  Will f/u on results and call with recommendations.  We will consider spine surgery referral based on the MRI results  Recommended  local ice and heat, avoid triggering activities,   Recommended to start on prednisone 40 mg once daily in the morning next 5 days  Dosing and potential medication side effects discussed.  Patient verbalised understanding and is agreeable to the plan.    - MR Lumbar Spine w/o & w Contrast; Future  - predniSONE (DELTASONE) 20 MG tablet; Take 40 mg by mouth daily for 5 days.  Dispense: 10 tablet; Refill: 0    2. S/P lumbar spine operation  as above    - MR Lumbar Spine w/o & w Contrast; Future  - predniSONE (DELTASONE) 20 MG tablet; Take 40 mg by mouth daily for 5 days.  Dispense: 10 tablet;  Refill: 0    Chart documentation done in part with Dragon Voice recognition Software. Although reviewed after completion, some word and grammatical error may remain.    See Patient Instructions    Parminder Taveras MD  [facility]

## 2019-03-06 ENCOUNTER — ANCILLARY PROCEDURE (OUTPATIENT)
Dept: MRI IMAGING | Facility: CLINIC | Age: 39
End: 2019-03-06
Attending: FAMILY MEDICINE
Payer: COMMERCIAL

## 2019-03-06 DIAGNOSIS — M54.16 LUMBAR BACK PAIN WITH RADICULOPATHY AFFECTING LEFT LOWER EXTREMITY: ICD-10-CM

## 2019-03-06 DIAGNOSIS — Z98.890 S/P LUMBAR SPINE OPERATION: ICD-10-CM

## 2019-03-06 DIAGNOSIS — M54.42 ACUTE BILATERAL LOW BACK PAIN WITH LEFT-SIDED SCIATICA: ICD-10-CM

## 2019-03-06 DIAGNOSIS — Z98.890 S/P LUMBAR SPINE OPERATION: Primary | ICD-10-CM

## 2019-03-06 DIAGNOSIS — M51.16 LUMBAR DISC HERNIATION WITH RADICULOPATHY: ICD-10-CM

## 2019-03-06 DIAGNOSIS — M54.50 BILATERAL LOW BACK PAIN WITHOUT SCIATICA, UNSPECIFIED CHRONICITY: ICD-10-CM

## 2019-03-06 PROCEDURE — A9585 GADOBUTROL INJECTION: HCPCS

## 2019-03-06 PROCEDURE — 72158 MRI LUMBAR SPINE W/O & W/DYE: CPT | Mod: TC

## 2019-03-06 RX ORDER — GADOBUTROL 604.72 MG/ML
7.5 INJECTION INTRAVENOUS ONCE
Status: COMPLETED | OUTPATIENT
Start: 2019-03-06 | End: 2019-03-06

## 2019-03-06 RX ADMIN — GADOBUTROL 6.5 ML: 604.72 INJECTION INTRAVENOUS at 08:45

## 2019-03-06 NOTE — RESULT ENCOUNTER NOTE
Dear Phyllis,  Your lumbar MRI showed bulging disc at 2 levels, causing concerns with radiculopathy and leg symptoms .  As we discussed, I would recommend to consult spine surgery for further evaluation given your previous history of surgery before considering epidural steroid injections .  I placed a referral, please wait to hear from our clinic scheduler for the appointment.   Let me know if you have any questions. Take care.  Parminder Taveras MD

## 2019-03-06 NOTE — RESULT ENCOUNTER NOTE
Spoke with patient, she will decide which location she wants to go to and Mychart us back.  We will need to fax information to [facility] if she chooses them.    Nury Vincent  Primary Care   [facility]

## 2019-03-17 ENCOUNTER — MYC MEDICAL ADVICE (OUTPATIENT)
Dept: PEDIATRICS | Facility: CLINIC | Age: 39
End: 2019-03-17

## 2019-03-17 DIAGNOSIS — M54.42 ACUTE BILATERAL LOW BACK PAIN WITH LEFT-SIDED SCIATICA: ICD-10-CM

## 2019-03-17 ASSESSMENT — ENCOUNTER SYMPTOMS
SPEECH CHANGE: 1
EYE WATERING: 1
EYE REDNESS: 0
WEIGHT LOSS: 0
HEMATURIA: 0
MYALGIAS: 1
DECREASED CONCENTRATION: 1
DIZZINESS: 1
POLYPHAGIA: 0
SYNCOPE: 0
LEG PAIN: 1
ALTERED TEMPERATURE REGULATION: 0
EYE PAIN: 0
HYPOTENSION: 0
HYPERTENSION: 0
INSOMNIA: 1
SWOLLEN GLANDS: 0
POLYDIPSIA: 0
JOINT SWELLING: 1
INCREASED ENERGY: 1
DECREASED APPETITE: 0
NECK PAIN: 0
DEPRESSION: 1
NERVOUS/ANXIOUS: 1
LOSS OF CONSCIOUSNESS: 0
SEIZURES: 0
TREMORS: 0
MUSCLE WEAKNESS: 1
FATIGUE: 1
MEMORY LOSS: 0
MUSCLE CRAMPS: 1
BACK PAIN: 1
PANIC: 0
EXERCISE INTOLERANCE: 1
WEIGHT GAIN: 1
FEVER: 0
WEAKNESS: 1
DYSURIA: 0
BRUISES/BLEEDS EASILY: 1
DOUBLE VISION: 0
HEADACHES: 1
LIGHT-HEADEDNESS: 1
HALLUCINATIONS: 0
NIGHT SWEATS: 1
EYE IRRITATION: 0
STIFFNESS: 1
NUMBNESS: 1
TINGLING: 1
PALPITATIONS: 1
FLANK PAIN: 1
SLEEP DISTURBANCES DUE TO BREATHING: 0
ORTHOPNEA: 0
DIFFICULTY URINATING: 0
ARTHRALGIAS: 1
CHILLS: 1
DISTURBANCES IN COORDINATION: 1
PARALYSIS: 0

## 2019-03-18 RX ORDER — CYCLOBENZAPRINE HCL 10 MG
10 TABLET ORAL 3 TIMES DAILY PRN
Qty: 15 TABLET | Refills: 1 | Status: SHIPPED | OUTPATIENT
Start: 2019-03-18 | End: 2019-04-16

## 2019-03-18 NOTE — TELEPHONE ENCOUNTER
Pending Prescriptions:                       Disp   Refills    cyclobenzaprine (FLEXERIL) 10 MG tablet   15 tab*1            Sig: Take 1 tablet (10 mg) by mouth 3 times daily as           needed for muscle spasms      Last Written Prescription Date:  2/13/19  Last Fill Quantity: 15,  # refills: 1   Last office visit: 3/4/2019 with prescribing provider:  Dr. Taveras   Future Office Visit:      Routing refill request to provider for review/approval because:  Drug not on the [facility], [facility] or [facility] refill protocol or controlled substance

## 2019-03-21 ENCOUNTER — OFFICE VISIT (OUTPATIENT)
Dept: NEUROSURGERY | Facility: CLINIC | Age: 39
End: 2019-03-21
Payer: COMMERCIAL

## 2019-03-21 VITALS
HEART RATE: 133 BPM | SYSTOLIC BLOOD PRESSURE: 122 MMHG | BODY MASS INDEX: 23.3 KG/M2 | DIASTOLIC BLOOD PRESSURE: 88 MMHG | OXYGEN SATURATION: 99 % | HEIGHT: 66 IN | WEIGHT: 145 LBS

## 2019-03-21 DIAGNOSIS — M51.16 LUMBAR DISC HERNIATION WITH RADICULOPATHY: Primary | ICD-10-CM

## 2019-03-21 RX ORDER — IBUPROFEN 800 MG/1
800 TABLET, FILM COATED ORAL EVERY 8 HOURS PRN
Status: ON HOLD | COMMUNITY
End: 2019-04-02

## 2019-03-21 RX ORDER — ACETAMINOPHEN 500 MG
1000 TABLET ORAL EVERY 6 HOURS PRN
COMMUNITY
End: 2019-04-16

## 2019-03-21 ASSESSMENT — MIFFLIN-ST. JEOR: SCORE: 1354.47

## 2019-03-21 ASSESSMENT — PAIN SCALES - GENERAL: PAINLEVEL: SEVERE PAIN (6)

## 2019-03-21 NOTE — LETTER
3/21/2019       RE: Jaylin Day  [address] [place] [address]     Dear Colleague,    Thank you for referring your patient, Jaylin Day, to the [facility] Please see a copy of my visit note below.    Clinic note dictated #[id number]    Y    Service Date: 03/21/2019      CHIEF COMPLAINT:  Back pain.      HISTORY OF PRESENT ILLNESS:  Ms. Day is a 38-year-old woman with a history of left leg pain, status post left minimally invasive L4-L5 microdiskectomy by Dr. Aguayo in 09/2016. Intra-op, she had a small dural tear that was repaired with Duragen according to the op note by Dr. Aguayo. The patient did well following the surgery; however, over the past about 3 months she had recurrence of her symptoms.  She reports left leg pain along the lateral aspect of the thigh and leg.  The pain is worse during ambulation and gets better with sitting down.  She reports no bowel or bladder issues.  She denies any weakness or other deficits.  A new MRI was done and she was then referred here for further evaluation.  She also underwent physical therapy which did not improve her symptoms.      PAST MEDICAL HISTORY:  The patient is otherwise healthy.      PAST SURGICAL HISTORY:  Left L4-L5 MIS microdiskectomy.      SOCIAL HISTORY:  The patient is a nonsmoker.      PHYSICAL EXAMINATION:  The patient is awake, alert and oriented x3.  Her cranial nerves are grossly intact.  Strength is 5/5 in upper and lower extremities bilaterally.  Sensation is intact to light touch.  Gait is within normal limits.      IMAGING:  MRI of the lumbar spine on 03/06/2019 reveals a large disk herniation at the L4-L5 level with compression of the left L5 nerve root.  There is also a smaller disk at L5-S1, but it is more protruding on the right side.      IMPRESSION:  Ms. Day is a 38-year-old woman who presented with recurrent left leg pain in L5 distribution.   Imaging reveals a large disk at L4-L5 paracentral to the left side with compression of the left L5 nerve root. We discussed options of treatment.  The patient may benefit from redo MIS left L4-L5 hemilaminectomy and microdiskectomy.  discussed risks including dural tear or CSF leak, nerve damage, infection, bleeding.  The patient would like to proceed with the surgery.        I have seen this patient with the resident and formulated a plan and agree with this note.  ANUSHA DELANEY MD       As dictated by ÁNGEL GOMEZ MD            D: 2019   T: 2019   MT: UJANJOSE      Name:     ALAN KAISER   MRN:      [id number]        Account:      [id number]   :      1980           Service Date: 2019      Document: [id number]

## 2019-03-21 NOTE — PROGRESS NOTES
Service Date: 03/21/2019      CHIEF COMPLAINT:  Back pain.      HISTORY OF PRESENT ILLNESS:  Ms. Day is a 38-year-old woman with a history of left leg pain, status post left minimally invasive L4-L5 microdiskectomy by Dr. Aguayo in 09/2016. Intra-op, she had a small dural tear that was repaired with Duragen according to the op note by Dr. Aguayo. The patient did well following the surgery; however, over the past about 3 months she had recurrence of her symptoms.  She reports left leg pain along the lateral aspect of the thigh and leg.  The pain is worse during ambulation and gets better with sitting down.  She reports no bowel or bladder issues.  She denies any weakness or other deficits.  A new MRI was done and she was then referred here for further evaluation.  She also underwent physical therapy which did not improve her symptoms.      PAST MEDICAL HISTORY:  The patient is otherwise healthy.      PAST SURGICAL HISTORY:  Left L4-L5 MIS microdiskectomy.      SOCIAL HISTORY:  The patient is a nonsmoker.      PHYSICAL EXAMINATION:  The patient is awake, alert and oriented x3.  Her cranial nerves are grossly intact.  Strength is 5/5 in upper and lower extremities bilaterally.  Sensation is intact to light touch.  Gait is within normal limits.      IMAGING:  MRI of the lumbar spine on 03/06/2019 reveals a large disk herniation at the L4-L5 level with compression of the left L5 nerve root.  There is also a smaller disk at L5-S1, but it is more protruding on the right side.      IMPRESSION:  Ms. Day is a 38-year-old woman who presented with recurrent left leg pain in L5 distribution.  Imaging reveals a large disk at L4-L5 paracentral to the left side with compression of the left L5 nerve root. We discussed options of treatment.  The patient may benefit from redo MIS left L4-L5 hemilaminectomy and microdiskectomy.  discussed risks including dural tear or CSF leak, nerve damage, infection, bleeding.  The  patient would like to proceed with the surgery.        I have seen this patient with the resident and formulated a plan and agree with this note.  ANUSHA DELANEY MD       As dictated by ÁNGEL GOMEZ MD            D: 2019   T: 2019   MT: JUANJOSE      Name:     ALAN KAISER   MRN:      [id number]        Account:      [id number]   :      1980           Service Date: 2019      Document: [id number]

## 2019-03-21 NOTE — NURSING NOTE
Chief Complaint   Patient presents with     RECHECK     LOW BACK PAIN WITH RADICULOPATHY       Tiesha Whitten MA

## 2019-03-26 ENCOUNTER — PATIENT OUTREACH (OUTPATIENT)
Dept: NEUROSURGERY | Facility: CLINIC | Age: 39
End: 2019-03-26

## 2019-03-26 NOTE — PROGRESS NOTES
Pre-op packet sent via US Mail with instructions to call upon receipt.    PAC:  4/22/19  DOS: 4/29/19

## 2019-03-31 ENCOUNTER — TELEPHONE (OUTPATIENT)
Dept: NEUROSURGERY | Facility: CLINIC | Age: 39
End: 2019-03-31

## 2019-03-31 ENCOUNTER — HOSPITAL ENCOUNTER (EMERGENCY)
Facility: CLINIC | Age: 39
Discharge: HOME | End: 2019-03-31
Attending: EMERGENCY MEDICINE | Admitting: EMERGENCY MEDICINE
Payer: COMMERCIAL

## 2019-03-31 VITALS
OXYGEN SATURATION: 99 % | HEIGHT: 66 IN | HEART RATE: 110 BPM | DIASTOLIC BLOOD PRESSURE: 73 MMHG | SYSTOLIC BLOOD PRESSURE: 121 MMHG | TEMPERATURE: 98 F | WEIGHT: 140 LBS | BODY MASS INDEX: 22.5 KG/M2 | RESPIRATION RATE: 18 BRPM

## 2019-03-31 DIAGNOSIS — M54.16 LUMBAR RADICULOPATHY: ICD-10-CM

## 2019-03-31 PROCEDURE — 99285 EMERGENCY DEPT VISIT HI MDM: CPT | Mod: 25 | Performed by: EMERGENCY MEDICINE

## 2019-03-31 PROCEDURE — 96374 THER/PROPH/DIAG INJ IV PUSH: CPT | Performed by: EMERGENCY MEDICINE

## 2019-03-31 PROCEDURE — 25000128 H RX IP 250 OP 636: Performed by: EMERGENCY MEDICINE

## 2019-03-31 PROCEDURE — 99285 EMERGENCY DEPT VISIT HI MDM: CPT | Mod: Z6 | Performed by: EMERGENCY MEDICINE

## 2019-03-31 PROCEDURE — 96361 HYDRATE IV INFUSION ADD-ON: CPT | Performed by: EMERGENCY MEDICINE

## 2019-03-31 RX ORDER — METHYLPREDNISOLONE 4 MG
TABLET, DOSE PACK ORAL
Qty: 21 TABLET | Refills: 0 | Status: ON HOLD | OUTPATIENT
Start: 2019-03-31 | End: 2019-04-02

## 2019-03-31 RX ORDER — OXYCODONE HYDROCHLORIDE 5 MG/1
5 TABLET ORAL EVERY 6 HOURS PRN
Qty: 18 TABLET | Refills: 0 | Status: ON HOLD | OUTPATIENT
Start: 2019-03-31 | End: 2019-04-02

## 2019-03-31 RX ORDER — HYDROMORPHONE HYDROCHLORIDE 1 MG/ML
0.5 INJECTION, SOLUTION INTRAMUSCULAR; INTRAVENOUS; SUBCUTANEOUS
Status: COMPLETED | OUTPATIENT
Start: 2019-03-31 | End: 2019-03-31

## 2019-03-31 RX ADMIN — SODIUM CHLORIDE 500 ML: 9 INJECTION, SOLUTION INTRAVENOUS at 16:02

## 2019-03-31 RX ADMIN — Medication 0.5 MG: at 16:01

## 2019-03-31 ASSESSMENT — ENCOUNTER SYMPTOMS
WEAKNESS: 1
SHORTNESS OF BREATH: 0
DYSURIA: 0
ABDOMINAL PAIN: 0
HEMATURIA: 0
FEVER: 0
DIFFICULTY URINATING: 0
BACK PAIN: 1
NUMBNESS: 1
COUGH: 0
FREQUENCY: 0
COLOR CHANGE: 0

## 2019-03-31 ASSESSMENT — MIFFLIN-ST. JEOR: SCORE: 1331.79

## 2019-03-31 NOTE — ED NOTES
Patient signed out to me at 1730. Awaiting result of neurosurgery consult. Neurosurgery was considering observation admission for pain control and early surgery, however staff unable to fit into schedule. Neurosurgery discussed options with the patient and her family. They have elected trial of discharge with medrol dosepack and oxycodone for breakthrough pain. They are to contact the neurosurgery dept tomorrow to try to reschedule the surgery for an earlier date. Patient was agreeable to this plan.     Rivas Cho MD  03/31/19 [id number]

## 2019-03-31 NOTE — ED PROVIDER NOTES
History     Chief Complaint   Patient presents with     Back Pain     Hip Pain     HPI  Jaylin Day is a 38 year old female with a history of left leg pain, status post left minimally invasive L4-L5 microdiskectomy (09/2016), who presents to the Emergency Department for evaluation of back pain and left hip pain. Patient complains of progressively worsening back and left hip pain over the past three days. Additionally, she complains of worsening left lower extremity weakness as well as intermittent numbness in the same leg. Patient reports that all her symptoms were present before, but reports that everything seems worse over the last 3 dasy and heightened today. She reportedly used Flexeril once last night and once this morning. She also took an ibuprofen this morning. She denies any abdominal pain, urinary symptoms, changes in bowel habits, upper respiratory symptoms, chest pain, shortness of breath, or skin rashes. She denies any recent injuries or trauma. She denies any saddle anesthesia, or loss of any bowel or bladder control. Patient reports she has been using a cane in order to ambulate at home. She was recently on a prednisone taper which she did not find helpful. She has not found much relief using ibuprofen, Flexeril or Tylenol.     I have reviewed the Medications, Allergies, Past Medical and Surgical History, and Social History in the [facility] system.    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 No family hx of      Diabetes No family hx of   "      Social History     Tobacco Use     Smoking status: Former Smoker     Types: Cigarettes     Last attempt to quit: 2006     Years since quittin.2     Smokeless tobacco: Never Used   Substance Use Topics     Alcohol use: Yes     Comment: 3-4 drinks per week     Current Facility-Administered Medications   Medication     0.9% sodium chloride BOLUS     Current Outpatient Medications   Medication     acetaminophen (TYLENOL) 500 MG tablet     cyclobenzaprine (FLEXERIL) 10 MG tablet     ibuprofen (ADVIL/MOTRIN) 800 MG tablet     nitroFURantoin macrocrystal-monohydrate (MACROBID) 100 MG capsule     meloxicam (MOBIC) 15 MG tablet     MULTIVITAMIN TABS   OR     traMADol (ULTRAM) 50 MG tablet        Allergies   Allergen Reactions     Methocarbamol Hives       Review of Systems   Constitutional: Negative for fever.   HENT: Negative for postnasal drip.    Respiratory: Negative for cough and shortness of breath.    Cardiovascular: Negative for chest pain.   Gastrointestinal: Negative for abdominal pain.   Genitourinary: Negative for difficulty urinating, dyspareunia, dysuria, frequency, hematuria and urgency.   Musculoskeletal: Positive for back pain.        Positive for left hip pain.   Skin: Negative for color change and rash.   Neurological: Positive for weakness (LLE) and numbness (LLE).   All other systems reviewed and are negative.      Physical Exam   BP: 126/79  Pulse: 110  Temp: 98  F (36.7  C)  Resp: 18  Height: 167.6 cm (5' 6\")  Weight: 63.5 kg (140 lb)  SpO2: 100 %      Physical Exam   General: patient is alert and oriented, uncomfortable appearing  Head: atraumatic and normocephalic   EENT: moist mucus membranes, pupils equal, sclerae anicteric  Neck: supple with full range of motion   cardiovascular: regular rate and rhythm, extremities warm and well perfused, no lower extremity edema, 2+ DP pulses bilaterally  Pulmonary: lungs clear to auscultation bilaterally   Abdomen: soft, non-tender "   Musculoskeletal: No gross deformity, no midline lumbar tenderness to palpation, left lumbar paraspinal tenderness and into the left hip  Neurological: alert and oriented, no facial droop, normal speech, strength is 4 out of 5 with left hip flexion/extension, knee flexion/extension and ankle plantar/dorsiflexion, sensation to light touch in lower extremities intact, antalgic gait   skin: warm, dry, no areas of erythema or vescicular rash    ED Course        Procedures             Critical Care time:  none             Labs Ordered and Resulted from Time of ED Arrival Up to the Time of Departure from the ED - No data to display         Assessments & Plan (with Medical Decision Making)   Ms. Day is a 38-year-old female who is status post minimally invasive L4-L5 microdiscectomy in 2016 and presents to the Emergency Department with worsening left lower extremity pain. She did have an MRI on March 6 of this year which showed a large L4-L5 paracentral disc herniation with compression of the L5 nerve root.  She has not sustained any new traumatic injury and denies any infectious symptoms.  She has had progression of her pain in the left lower extremity and now has worsening numbness as well as some weakness into the left leg.  She denies any loss of bowel or bladder control.  She was given IV Dilaudid in the emergency department. I discussed with Neurosurgery and awaiting consultation at time of sign out to evening provider.      I have reviewed the nursing notes.    I have reviewed the findings, diagnosis, plan and need for follow up with the patient.       Medication List      ASK your doctor about these medications    predniSONE 20 MG tablet  Commonly known as:  DELTASONE  Take 60 mg by mouth daily for 3 days, THEN 40 mg daily for 3 days, THEN 20 mg daily for 3 days, THEN 10 mg daily for 3 days.  Start taking on:  3/8/2019  Ask about: Should I take this medication?            Final diagnoses:   Lumbar radiculopathy      I, Fanta Shelley, am serving as a trained medical scribe to document services personally performed by Anu Sanz MD, based on the provider's statements to me.   I, Anu Sanz MD, was physically present and have reviewed and verified the accuracy of this note documented by Fanta Shelley.    3/31/2019   [facility], [place], EMERGENCY DEPARTMENT     Anu Samson MD  03/31/19 4800

## 2019-03-31 NOTE — ED AVS SNAPSHOT
[facility], [place], Emergency Department  [address] [place] [id number]  Phone:  [phone]                                    Jaylin Day   MRN: [id number]    Department:  [facility], Emergency Department   Date of Visit:  3/31/2019           After Visit Summary Signature Page    I have received my discharge instructions, and my questions have been answered. I have discussed any challenges I see with this plan with the nurse or doctor.    ..........................................................................................................................................  Patient/Patient Representative Signature      ..........................................................................................................................................  Patient Representative Print Name and Relationship to Patient    ..................................................               ................................................  Date                                   Time    ..........................................................................................................................................  Reviewed by Signature/Title    ...................................................              ..............................................  Date                                               Time          22EPIC Rev 08/18

## 2019-03-31 NOTE — CONSULTS
Neurosurgery Consult Note     CC: back/leg pain     HPI: Patient is a 38-year-old with a history of left sided radicular type pain who  underwent a L4-L5 microdiscectomy in 2016 by Dr. Aguayo.      She was most recently seen in Dr. Greenberg's clinic on March 21.  An MRI from March 6 showed a large disc herniation at L4-5 with compression of the left L5 nerve root.  There are plans for her to undergo another microdiscectomy surgery (MIS L4-5 hemilaminectomy/microdiskectomy)  at the end of April.    She has been experiencing left lateral leg pain involving the lateral hip, thigh and distal leg crossing medially (L4-5 distribution)  really ever since Gracy leary (2018).  This pain is worse during ambulation and improves when sitting.  She has denied any bowel, bladder or motor symptoms.  She has been tried on physical therapy and has been treating the pain conservatively with ibuprofen and Flexeril. She did receive of a course of prednisone ~1.5 weeks ago.      Unfortunately the pain is become more unbearable in the past 3 days.  This severely limiting her quality of life. It is taking her 45 minutes to get out of bed. She is having sever pain while walking and is unable to work (as a ) because of her pain. She is also having difficulty with house work. She received IV dilaudid in the ER and the pain became more bearable.       Past Medical/Surgical History  Past Medical History:   Diagnosis Date     Female pelvic pain 2008    possible mild bicornuate or septate uterus     Migraines 2004    resolved      Preeclampsia      Recurrent UTI      Surgical History:   Left L4-L5 microdiscectomy in 2016 by Dr. Aguayo.        Family History  Family History   Problem Relation Age of Onset     Hypertension Maternal Grandmother      Thyroid Disease Son         hypothyroidism, congenital     Hypertension Maternal Uncle      C.A.D. No family hx of      Diabetes No family hx of        Social History  ,  self-employed    Social History     Tobacco Use     Smoking status: Former Smoker     Types: Cigarettes     Last attempt to quit: 2006     Years since quittin.2     Smokeless tobacco: Never Used   Substance Use Topics     Alcohol use: Yes     Comment: 3-4 drinks per week       Medications  Current Outpatient Medications   Medication Sig Dispense Refill     acetaminophen (TYLENOL) 500 MG tablet Take 1,000 mg by mouth every 6 hours as needed for mild pain       cyclobenzaprine (FLEXERIL) 10 MG tablet Take 1 tablet (10 mg) by mouth 3 times daily as needed for muscle spasms 15 tablet 1     ibuprofen (ADVIL/MOTRIN) 800 MG tablet Take 800 mg by mouth every 8 hours as needed for moderate pain       nitroFURantoin macrocrystal-monohydrate (MACROBID) 100 MG capsule TAKE ONE CAPSULE BY MOUTH AFTER INTERCOURSE AS NEEDED 90 capsule 0     meloxicam (MOBIC) 15 MG tablet Take 1 tablet (15 mg) by mouth daily (Patient not taking: Reported on 3/3/2019) 90 tablet 1     MULTIVITAMIN TABS   OR 1 TABLET DAILY       traMADol (ULTRAM) 50 MG tablet Take 1 tablet (50 mg) by mouth every 6 hours as needed for severe pain (Patient not taking: Reported on 3/4/2019) 15 tablet 0       Allergies  Allergies   Allergen Reactions     Methocarbamol Hives       ROS: 10 point ROS of systems including Constitutional, Eyes, Respiratory, Cardiovascular, Gastroenterology, Genitourinary, Integumentary, Muscularskeletal, Psychiatric were all negative except for pertinent positives noted in my HPI.     Physical Exam:     General:   Comfortable respiratory effort; normal cardiac rhythm.     Mental Status:   awake, alert and oriented x3.   fluent, communicative, intact comprehension.    Cranial Nerves:  equal and reactive pupils   extraocular movements preserved   no dysarthria     Motor/Sensory:  5/5 strength throughout upper extremities  5/5 right LE strength with hip flexion/extension, knee flexion/extension, dorsi/plantar flexion, EHL  Left 5/5 in  above muscle groups with some pain limitation with knee flexion/extension but strong for brief periods of effort. Clear 4/5 left EHL weakness. No overt weakness with foot inversion.   No deficits to pain, soft touch.     Reflexes: symmetric, brisk at the knees, no clonus, preserved ankle reflexes bilaterally     The labs and imaging for this patient have been reviewed    MRI 3/06  IMPRESSION:  1. L4-L5 large central/left lateral recess 0.9 cm disc extrusion which  appears to posteriorly displace and likely compresses the left L5  nerve root.  2. L5-S1 small to moderate 0.5 cm central disc protrusion, appearing  slightly more prominent to the right of midline than on 3/31/2016, but  without apparent mass effect upon the thecal sac or S1 nerve root  displacement.      Assessment/Plan:  The patient is a 38-year-old female with a recurrent disc herniation at L4-5 on the left.  She has clear symptoms attributable to this.  Fortunately there are no alarm signs, and her only objective deficit is slight left extensor hallucis longus weakness.  Her pain has become quite debilitating, however.  We discussed the plan of care in depth with the patient.  Given the inability to guarantee an earlier operative time and the patient's preference to avoid hospitalization, she will be tried on a repeat Medrol Dosepak and some oxycodone for breakthrough pain.  We will discuss with Dr. Greenberg when able tomorrow, in order to see if her operative schedule would allow for an expedited operation of the one planned for the end of April.  The patient expressed gratitude and understanding.  They know to call the clinic if they have not heard from us by late morning.    - Medrol dose pack   - Oxycodone   - we will arrange surgery scheduling with Dr. Greenberg  - ok to discharge from the ER       John (Jack) M. Leschke, M.D.      Please call me directly for additional questions/concerns   (pager )    The patient was discussed with   Phan Livingston

## 2019-03-31 NOTE — TELEPHONE ENCOUNTER
The patient calls with severe radiculopathy as described previously without overt weakness. She says the symptoms have become debilitating. I instructed her to seek immediate care for worsening of pain or development of weakness. I advised additional doses of flexeril but to call back if symptoms do not improve. She may need short term narcotic pain medications prior to her operation, and perhaps an earlier surgery date if the pain is further debilitating. She expressed understanding.

## 2019-03-31 NOTE — DISCHARGE INSTRUCTIONS
Medrol dosepack as directed.  Oxycodone 5 mg every 6 hours as needed for breakthrough pain.  Contact Neurosurgery clinic tomorrow to discuss moving up your surgery date.  Return as needed for new or worsening symptoms.

## 2019-04-01 ENCOUNTER — PATIENT OUTREACH (OUTPATIENT)
Dept: NEUROSURGERY | Facility: CLINIC | Age: 39
End: 2019-04-01

## 2019-04-01 ENCOUNTER — ANESTHESIA EVENT (OUTPATIENT)
Dept: SURGERY | Facility: CLINIC | Age: 39
End: 2019-04-01
Payer: COMMERCIAL

## 2019-04-01 ASSESSMENT — LIFESTYLE VARIABLES: TOBACCO_USE: 1

## 2019-04-01 NOTE — PROGRESS NOTES
Telephone Pre-op Teaching    Procedure: Left L4/5 Microdisc  Planned Surgery Date: 4/2/2019  Surgeon: Dionicio  PAC(if applicable): N/a                    Pre-op folder with specific written instructions mailed to patient on:3/26/19 for review.    Patient in ED over the weekend with exacerbation of sympoms.  Evaluated by Neurosurgery and case moved up to 4/2/19.    Call to discuss pre-op instructions/routine and requirements to include:  surgical procedure, post-op recovery and expectations, need for H&P/PAC visit, NPO prior to OR, pre-op antibacterial showers, pain control and importance of follow-up visits.  Surgery scheduling will coordinate OR time/date and update patient as appropriate.  Ample time was provided for patient questions and in-depth discussion of topics of heightened interest.  Reviewed medication list and provided instructions regarding what medications to stop prior to surgery: Motrin, Narcotic pain medications, muscle relaxants.       Patient/Spouse were provided triage contact number for questions or concerns that may arise prior to surgery.

## 2019-04-01 NOTE — ANESTHESIA PREPROCEDURE EVALUATION
Anesthesia Pre-Procedure Evaluation    Patient: Jaylin Day   MRN:     [id number] Gender:   female   Age:    38 year old :      1980        Preoperative Diagnosis: Lumbar Disc Herniation With Radiculopathy   Procedure(s):  Redo Minimally Invasive Left Lumbar 4/5 Microdiscectomy     Past Medical History:   Diagnosis Date     Female pelvic pain 2008    possible mild bicornuate or septate uterus     Migraines 2004    resolved      Preeclampsia      Recurrent UTI       Past Surgical History:   Procedure Laterality Date     BACK SURGERY       left L4-5 hemilaminectomy, microdiskectomy  2016          Anesthesia Evaluation     . Pt has had prior anesthetic. Type: General    No history of anesthetic complications          ROS/MED HX    ENT/Pulmonary:     (+)tobacco use, Past use , . .    Neurologic:  - neg neurologic ROS     Cardiovascular:  - neg cardiovascular ROS       METS/Exercise Tolerance:  >4 METS   Hematologic:  - neg hematologic  ROS       Musculoskeletal: Comment: Low back pain  Lumbar disc herniation         GI/Hepatic:  - neg GI/hepatic ROS       Renal/Genitourinary:  - ROS Renal section negative       Endo:  - neg endo ROS       Psychiatric:  - neg psychiatric ROS       Infectious Disease:  - neg infectious disease ROS       Malignancy:      - no malignancy   Other:    (+) C-spine cleared: N/A, H/O Chronic Pain,H/O chronic opiod use , no other significant disability                        PHYSICAL EXAM:   Mental Status/Neuro: A/A/O   Airway: Facies: Feasible   Respiratory:   Resp. Effort: Normal      CV:   Rate: Age appropriate   Comments:                    Lab Results   Component Value Date    WBC 5.7 2018    HGB 14.1 2018    HCT 43.8 2018     2018     2018    POTASSIUM 4.2 2018    CHLORIDE 105 2018    CO2 27 2018    BUN 14 2018    CR 0.61 2018    GLC 94 2018    NARCISA 8.5 2018    ALBUMIN 3.6 2018     "PROTTOTAL 7.2 02/21/2018    ALT 29 01/30/2019    AST 16 01/30/2019    ALKPHOS 51 02/21/2018    BILITOTAL 0.5 02/21/2018    TSH 1.46 02/21/2018    HCG Negative 08/16/2012       Preop Vitals  BP Readings from Last 3 Encounters:   03/31/19 121/73   03/21/19 122/88   03/04/19 115/82    Pulse Readings from Last 3 Encounters:   03/31/19 110   03/21/19 133   03/04/19 103      Resp Readings from Last 3 Encounters:   03/31/19 18   01/30/19 16   10/05/18 16    SpO2 Readings from Last 3 Encounters:   03/31/19 99%   03/21/19 99%   03/04/19 99%      Temp Readings from Last 1 Encounters:   03/31/19 36.7  C (98  F) (Oral)    Ht Readings from Last 1 Encounters:   03/31/19 1.676 m (5' 6\")      Wt Readings from Last 1 Encounters:   03/31/19 63.5 kg (140 lb)    Estimated body mass index is 22.6 kg/m  as calculated from the following:    Height as of 3/31/19: 1.676 m (5' 6\").    Weight as of 3/31/19: 63.5 kg (140 lb).     LDA:            Assessment:   ASA SCORE: 2    NPO Status: > 6 hours since completed Solid [employer]   Documentation: H&P complete; Preop Testing complete; Consents complete   Proceeding: Proceed without further delay  Tobacco Use:  NO Active use of Tobacco/UNKNOWN Tobacco use status     Plan:   Anes. Type:  General   Pre-Induction: Midazolam IV; Acetaminophen PO; Gabapentin PO   Induction:  IV (Standard)   Airway: Oral ETT   Access/Monitoring: PIV   Maintenance: Balanced   Emergence: Procedure Site   Logistics: Same Day Surgery     Postop Pain/Sedation Strategy:  Standard-Options: Opioids PRN     PONV Management:  Adult Risk Factors: Female, Non-Smoker, Postop Opioids  Prevention: Ondansetron; Dexamethasone     CONSENT: Direct conversation   Plan and risks discussed with: Patient                          "

## 2019-04-02 ENCOUNTER — ANESTHESIA (OUTPATIENT)
Dept: SURGERY | Facility: CLINIC | Age: 39
End: 2019-04-02
Payer: COMMERCIAL

## 2019-04-02 ENCOUNTER — APPOINTMENT (OUTPATIENT)
Dept: GENERAL RADIOLOGY | Facility: CLINIC | Age: 39
End: 2019-04-02
Attending: NEUROLOGICAL SURGERY
Payer: COMMERCIAL

## 2019-04-02 ENCOUNTER — HOSPITAL ENCOUNTER (OUTPATIENT)
Facility: CLINIC | Age: 39
Discharge: HOME | End: 2019-04-02
Attending: NEUROLOGICAL SURGERY | Admitting: NEUROLOGICAL SURGERY
Payer: COMMERCIAL

## 2019-04-02 VITALS
WEIGHT: 142.64 LBS | RESPIRATION RATE: 16 BRPM | DIASTOLIC BLOOD PRESSURE: 77 MMHG | TEMPERATURE: 98.2 F | BODY MASS INDEX: 22.92 KG/M2 | SYSTOLIC BLOOD PRESSURE: 125 MMHG | HEIGHT: 66 IN | HEART RATE: 94 BPM | OXYGEN SATURATION: 98 %

## 2019-04-02 DIAGNOSIS — M51.16 LUMBAR DISC HERNIATION WITH RADICULOPATHY: Primary | ICD-10-CM

## 2019-04-02 LAB
ABO + RH BLD: NORMAL
ABO + RH BLD: NORMAL
B-HCG SERPL-ACNC: <1 IU/L (ref 0–5)
BLD GP AB SCN SERPL QL: NORMAL
BLOOD BANK CMNT PATIENT-IMP: NORMAL
ERYTHROCYTE [DISTWIDTH] IN BLOOD BY AUTOMATED COUNT: 12.9 % (ref 10–15)
GLUCOSE BLDC GLUCOMTR-MCNC: 109 MG/DL (ref 70–99)
HCG UR QL: NEGATIVE
HCT VFR BLD AUTO: 40.1 % (ref 35–47)
HGB BLD-MCNC: 12.9 G/DL (ref 11.7–15.7)
MCH RBC QN AUTO: 31.7 PG (ref 26.5–33)
MCHC RBC AUTO-ENTMCNC: 32.2 G/DL (ref 31.5–36.5)
MCV RBC AUTO: 99 FL (ref 78–100)
PLATELET # BLD AUTO: 281 10E9/L (ref 150–450)
RBC # BLD AUTO: 4.07 10E12/L (ref 3.8–5.2)
SPECIMEN EXP DATE BLD: NORMAL
WBC # BLD AUTO: 7.8 10E9/L (ref 4–11)

## 2019-04-02 PROCEDURE — 25000128 H RX IP 250 OP 636: Performed by: NEUROLOGICAL SURGERY

## 2019-04-02 PROCEDURE — 25000125 ZZHC RX 250: Performed by: NURSE ANESTHETIST, CERTIFIED REGISTERED

## 2019-04-02 PROCEDURE — 25000128 H RX IP 250 OP 636: Performed by: NURSE ANESTHETIST, CERTIFIED REGISTERED

## 2019-04-02 PROCEDURE — 25000566 ZZH SEVOFLURANE, EA 15 MIN: Performed by: NEUROLOGICAL SURGERY

## 2019-04-02 PROCEDURE — 25000128 H RX IP 250 OP 636: Performed by: ANESTHESIOLOGY

## 2019-04-02 PROCEDURE — 27210995 ZZH RX 272: Performed by: NEUROLOGICAL SURGERY

## 2019-04-02 PROCEDURE — 40000277 XR SURGERY CARM FLUORO LESS THAN 5 MIN W STILLS: Mod: TC

## 2019-04-02 PROCEDURE — 25800030 ZZH RX IP 258 OP 636: Performed by: NURSE ANESTHETIST, CERTIFIED REGISTERED

## 2019-04-02 PROCEDURE — 25000125 ZZHC RX 250: Performed by: NEUROLOGICAL SURGERY

## 2019-04-02 PROCEDURE — 27210794 ZZH OR GENERAL SUPPLY STERILE: Performed by: NEUROLOGICAL SURGERY

## 2019-04-02 PROCEDURE — 40000171 ZZH STATISTIC PRE-PROCEDURE ASSESSMENT III: Performed by: NEUROLOGICAL SURGERY

## 2019-04-02 PROCEDURE — 71000014 ZZH RECOVERY PHASE 1 LEVEL 2 FIRST HR: Performed by: NEUROLOGICAL SURGERY

## 2019-04-02 PROCEDURE — 36000064 ZZH SURGERY LEVEL 4 EA 15 ADDTL MIN - UMMC: Performed by: NEUROLOGICAL SURGERY

## 2019-04-02 PROCEDURE — 71000015 ZZH RECOVERY PHASE 1 LEVEL 2 EA ADDTL HR: Performed by: NEUROLOGICAL SURGERY

## 2019-04-02 PROCEDURE — 25000128 H RX IP 250 OP 636

## 2019-04-02 PROCEDURE — 71000027 ZZH RECOVERY PHASE 2 EACH 15 MINS: Performed by: NEUROLOGICAL SURGERY

## 2019-04-02 PROCEDURE — 86901 BLOOD TYPING SEROLOGIC RH(D): CPT | Performed by: STUDENT IN AN ORGANIZED HEALTH CARE EDUCATION/TRAINING PROGRAM

## 2019-04-02 PROCEDURE — 37000009 ZZH ANESTHESIA TECHNICAL FEE, EACH ADDTL 15 MIN: Performed by: NEUROLOGICAL SURGERY

## 2019-04-02 PROCEDURE — 85027 COMPLETE CBC AUTOMATED: CPT | Performed by: STUDENT IN AN ORGANIZED HEALTH CARE EDUCATION/TRAINING PROGRAM

## 2019-04-02 PROCEDURE — 36415 COLL VENOUS BLD VENIPUNCTURE: CPT | Performed by: STUDENT IN AN ORGANIZED HEALTH CARE EDUCATION/TRAINING PROGRAM

## 2019-04-02 PROCEDURE — 81025 URINE PREGNANCY TEST: CPT | Performed by: ANESTHESIOLOGY

## 2019-04-02 PROCEDURE — 37000008 ZZH ANESTHESIA TECHNICAL FEE, 1ST 30 MIN: Performed by: NEUROLOGICAL SURGERY

## 2019-04-02 PROCEDURE — 36000066 ZZH SURGERY LEVEL 4 W FLUORO 1ST 30 MIN - UMMC: Performed by: NEUROLOGICAL SURGERY

## 2019-04-02 PROCEDURE — 84702 CHORIONIC GONADOTROPIN TEST: CPT | Performed by: STUDENT IN AN ORGANIZED HEALTH CARE EDUCATION/TRAINING PROGRAM

## 2019-04-02 PROCEDURE — 82962 GLUCOSE BLOOD TEST: CPT

## 2019-04-02 PROCEDURE — 86850 RBC ANTIBODY SCREEN: CPT | Performed by: STUDENT IN AN ORGANIZED HEALTH CARE EDUCATION/TRAINING PROGRAM

## 2019-04-02 PROCEDURE — 25000132 ZZH RX MED GY IP 250 OP 250 PS 637: Performed by: STUDENT IN AN ORGANIZED HEALTH CARE EDUCATION/TRAINING PROGRAM

## 2019-04-02 PROCEDURE — 86900 BLOOD TYPING SEROLOGIC ABO: CPT | Performed by: STUDENT IN AN ORGANIZED HEALTH CARE EDUCATION/TRAINING PROGRAM

## 2019-04-02 RX ORDER — CEFAZOLIN SODIUM 1 G/3ML
1 INJECTION, POWDER, FOR SOLUTION INTRAMUSCULAR; INTRAVENOUS SEE ADMIN INSTRUCTIONS
Status: DISCONTINUED | OUTPATIENT
Start: 2019-04-02 | End: 2019-04-02 | Stop reason: HOSPADM

## 2019-04-02 RX ORDER — FENTANYL CITRATE 50 UG/ML
25-50 INJECTION, SOLUTION INTRAMUSCULAR; INTRAVENOUS EVERY 5 MIN PRN
Status: DISCONTINUED | OUTPATIENT
Start: 2019-04-02 | End: 2019-04-02 | Stop reason: HOSPADM

## 2019-04-02 RX ORDER — DEXAMETHASONE SODIUM PHOSPHATE 4 MG/ML
INJECTION, SOLUTION INTRA-ARTICULAR; INTRALESIONAL; INTRAMUSCULAR; INTRAVENOUS; SOFT TISSUE PRN
Status: DISCONTINUED | OUTPATIENT
Start: 2019-04-02 | End: 2019-04-02

## 2019-04-02 RX ORDER — HYDROMORPHONE HYDROCHLORIDE 1 MG/ML
.3-.5 INJECTION, SOLUTION INTRAMUSCULAR; INTRAVENOUS; SUBCUTANEOUS EVERY 10 MIN PRN
Status: DISCONTINUED | OUTPATIENT
Start: 2019-04-02 | End: 2019-04-02 | Stop reason: HOSPADM

## 2019-04-02 RX ORDER — GABAPENTIN 300 MG/1
300 CAPSULE ORAL ONCE
Status: COMPLETED | OUTPATIENT
Start: 2019-04-02 | End: 2019-04-02

## 2019-04-02 RX ORDER — LIDOCAINE HYDROCHLORIDE 20 MG/ML
INJECTION, SOLUTION INFILTRATION; PERINEURAL PRN
Status: DISCONTINUED | OUTPATIENT
Start: 2019-04-02 | End: 2019-04-02

## 2019-04-02 RX ORDER — OXYCODONE HYDROCHLORIDE 5 MG/1
5 TABLET ORAL EVERY 4 HOURS PRN
Status: DISCONTINUED | OUTPATIENT
Start: 2019-04-02 | End: 2019-04-02 | Stop reason: HOSPADM

## 2019-04-02 RX ORDER — OXYCODONE HYDROCHLORIDE 5 MG/1
5 TABLET ORAL EVERY 6 HOURS PRN
Qty: 45 TABLET | Refills: 0 | Status: SHIPPED | OUTPATIENT
Start: 2019-04-02 | End: 2020-02-27

## 2019-04-02 RX ORDER — SODIUM CHLORIDE, SODIUM LACTATE, POTASSIUM CHLORIDE, CALCIUM CHLORIDE 600; 310; 30; 20 MG/100ML; MG/100ML; MG/100ML; MG/100ML
INJECTION, SOLUTION INTRAVENOUS CONTINUOUS PRN
Status: DISCONTINUED | OUTPATIENT
Start: 2019-04-02 | End: 2019-04-02

## 2019-04-02 RX ORDER — ONDANSETRON 2 MG/ML
INJECTION INTRAMUSCULAR; INTRAVENOUS PRN
Status: DISCONTINUED | OUTPATIENT
Start: 2019-04-02 | End: 2019-04-02

## 2019-04-02 RX ORDER — CEFAZOLIN SODIUM 2 G/100ML
2 INJECTION, SOLUTION INTRAVENOUS
Status: COMPLETED | OUTPATIENT
Start: 2019-04-02 | End: 2019-04-02

## 2019-04-02 RX ORDER — HYDRALAZINE HYDROCHLORIDE 20 MG/ML
2.5-5 INJECTION INTRAMUSCULAR; INTRAVENOUS EVERY 10 MIN PRN
Status: DISCONTINUED | OUTPATIENT
Start: 2019-04-02 | End: 2019-04-02 | Stop reason: HOSPADM

## 2019-04-02 RX ORDER — FENTANYL CITRATE 50 UG/ML
25-50 INJECTION, SOLUTION INTRAMUSCULAR; INTRAVENOUS EVERY 5 MIN PRN
Status: COMPLETED | OUTPATIENT
Start: 2019-04-02 | End: 2019-04-02

## 2019-04-02 RX ORDER — BUPIVACAINE HYDROCHLORIDE AND EPINEPHRINE 5; 5 MG/ML; UG/ML
INJECTION, SOLUTION PERINEURAL PRN
Status: DISCONTINUED | OUTPATIENT
Start: 2019-04-02 | End: 2019-04-02 | Stop reason: HOSPADM

## 2019-04-02 RX ORDER — IBUPROFEN 800 MG/1
800 TABLET, FILM COATED ORAL EVERY 8 HOURS PRN
COMMUNITY
Start: 2019-04-09 | End: 2019-04-02

## 2019-04-02 RX ORDER — FENTANYL CITRATE 50 UG/ML
25-50 INJECTION, SOLUTION INTRAMUSCULAR; INTRAVENOUS
Status: DISCONTINUED | OUTPATIENT
Start: 2019-04-02 | End: 2019-04-02 | Stop reason: HOSPADM

## 2019-04-02 RX ORDER — PROCHLORPERAZINE MALEATE 10 MG
10 TABLET ORAL EVERY 6 HOURS PRN
Qty: 40 TABLET | Refills: 3 | Status: SHIPPED | OUTPATIENT
Start: 2019-04-02 | End: 2019-04-16

## 2019-04-02 RX ORDER — SENNA AND DOCUSATE SODIUM 50; 8.6 MG/1; MG/1
1 TABLET, FILM COATED ORAL AT BEDTIME
Qty: 30 TABLET | Refills: 0 | Status: SHIPPED | OUTPATIENT
Start: 2019-04-02 | End: 2019-04-16

## 2019-04-02 RX ORDER — ACETAMINOPHEN 325 MG/1
975 TABLET ORAL ONCE
Status: COMPLETED | OUTPATIENT
Start: 2019-04-02 | End: 2019-04-02

## 2019-04-02 RX ORDER — LABETALOL HYDROCHLORIDE 5 MG/ML
5 INJECTION, SOLUTION INTRAVENOUS
Status: DISCONTINUED | OUTPATIENT
Start: 2019-04-02 | End: 2019-04-02 | Stop reason: HOSPADM

## 2019-04-02 RX ORDER — SODIUM CHLORIDE, SODIUM LACTATE, POTASSIUM CHLORIDE, CALCIUM CHLORIDE 600; 310; 30; 20 MG/100ML; MG/100ML; MG/100ML; MG/100ML
INJECTION, SOLUTION INTRAVENOUS CONTINUOUS
Status: DISCONTINUED | OUTPATIENT
Start: 2019-04-02 | End: 2019-04-02 | Stop reason: HOSPADM

## 2019-04-02 RX ORDER — ONDANSETRON 4 MG/1
4 TABLET, ORALLY DISINTEGRATING ORAL EVERY 30 MIN PRN
Status: DISCONTINUED | OUTPATIENT
Start: 2019-04-02 | End: 2019-04-02 | Stop reason: HOSPADM

## 2019-04-02 RX ORDER — PROPOFOL 10 MG/ML
INJECTION, EMULSION INTRAVENOUS PRN
Status: DISCONTINUED | OUTPATIENT
Start: 2019-04-02 | End: 2019-04-02

## 2019-04-02 RX ORDER — IBUPROFEN 800 MG/1
800 TABLET, FILM COATED ORAL EVERY 8 HOURS PRN
Qty: 60 TABLET | Refills: 11 | Status: SHIPPED | OUTPATIENT
Start: 2019-04-04 | End: 2022-12-16

## 2019-04-02 RX ORDER — ONDANSETRON 2 MG/ML
4 INJECTION INTRAMUSCULAR; INTRAVENOUS EVERY 30 MIN PRN
Status: DISCONTINUED | OUTPATIENT
Start: 2019-04-02 | End: 2019-04-02 | Stop reason: HOSPADM

## 2019-04-02 RX ORDER — LIDOCAINE 40 MG/G
CREAM TOPICAL
Status: DISCONTINUED | OUTPATIENT
Start: 2019-04-02 | End: 2019-04-02 | Stop reason: HOSPADM

## 2019-04-02 RX ORDER — NALOXONE HYDROCHLORIDE 0.4 MG/ML
.1-.4 INJECTION, SOLUTION INTRAMUSCULAR; INTRAVENOUS; SUBCUTANEOUS
Status: DISCONTINUED | OUTPATIENT
Start: 2019-04-02 | End: 2019-04-02 | Stop reason: HOSPADM

## 2019-04-02 RX ADMIN — FENTANYL CITRATE 250 MCG: 50 INJECTION INTRAMUSCULAR; INTRAVENOUS at 08:36

## 2019-04-02 RX ADMIN — GABAPENTIN 300 MG: 300 CAPSULE ORAL at 07:11

## 2019-04-02 RX ADMIN — HYDROMORPHONE HYDROCHLORIDE 0.3 MG: 1 INJECTION, SOLUTION INTRAMUSCULAR; INTRAVENOUS; SUBCUTANEOUS at 10:17

## 2019-04-02 RX ADMIN — Medication 0.5 MG: at 11:13

## 2019-04-02 RX ADMIN — Medication 0.5 MG: at 12:32

## 2019-04-02 RX ADMIN — SUGAMMADEX 150 MG: 100 INJECTION, SOLUTION INTRAVENOUS at 10:38

## 2019-04-02 RX ADMIN — DEXAMETHASONE SODIUM PHOSPHATE 8 MG: 4 INJECTION, SOLUTION INTRA-ARTICULAR; INTRALESIONAL; INTRAMUSCULAR; INTRAVENOUS; SOFT TISSUE at 09:15

## 2019-04-02 RX ADMIN — FENTANYL CITRATE 25 MCG: 50 INJECTION INTRAMUSCULAR; INTRAVENOUS at 07:48

## 2019-04-02 RX ADMIN — FENTANYL CITRATE 25 MCG: 50 INJECTION INTRAMUSCULAR; INTRAVENOUS at 08:06

## 2019-04-02 RX ADMIN — PROCHLORPERAZINE EDISYLATE 5 MG: 5 INJECTION INTRAMUSCULAR; INTRAVENOUS at 13:17

## 2019-04-02 RX ADMIN — ACETAMINOPHEN 975 MG: 325 TABLET, FILM COATED ORAL at 07:11

## 2019-04-02 RX ADMIN — ROCURONIUM BROMIDE 20 MG: 10 INJECTION INTRAVENOUS at 09:34

## 2019-04-02 RX ADMIN — Medication 0.5 MG: at 12:01

## 2019-04-02 RX ADMIN — FENTANYL CITRATE 50 MCG: 50 INJECTION INTRAMUSCULAR; INTRAVENOUS at 07:55

## 2019-04-02 RX ADMIN — SODIUM CHLORIDE, POTASSIUM CHLORIDE, SODIUM LACTATE AND CALCIUM CHLORIDE: 600; 310; 30; 20 INJECTION, SOLUTION INTRAVENOUS at 08:35

## 2019-04-02 RX ADMIN — ONDANSETRON 4 MG: 2 INJECTION INTRAMUSCULAR; INTRAVENOUS at 10:28

## 2019-04-02 RX ADMIN — CEFAZOLIN SODIUM 2 G: 2 INJECTION, SOLUTION INTRAVENOUS at 09:00

## 2019-04-02 RX ADMIN — MIDAZOLAM 2 MG: 1 INJECTION INTRAMUSCULAR; INTRAVENOUS at 08:35

## 2019-04-02 RX ADMIN — PROPOFOL 70 MG: 10 INJECTION, EMULSION INTRAVENOUS at 10:14

## 2019-04-02 RX ADMIN — Medication 0.5 MG: at 11:33

## 2019-04-02 RX ADMIN — ROCURONIUM BROMIDE 50 MG: 10 INJECTION INTRAVENOUS at 08:44

## 2019-04-02 RX ADMIN — LIDOCAINE HYDROCHLORIDE 100 MG: 20 INJECTION, SOLUTION INFILTRATION; PERINEURAL at 08:37

## 2019-04-02 RX ADMIN — PROPOFOL 130 MG: 10 INJECTION, EMULSION INTRAVENOUS at 08:44

## 2019-04-02 RX ADMIN — ROCURONIUM BROMIDE 10 MG: 10 INJECTION INTRAVENOUS at 09:58

## 2019-04-02 ASSESSMENT — MIFFLIN-ST. JEOR: SCORE: 1343.75

## 2019-04-02 NOTE — BRIEF OP NOTE
[facility], [place]    Brief Operative Note    Pre-operative diagnosis: Lumbar Disc Herniation With Radiculopathy  Post-operative diagnosis * No post-op diagnosis entered *  Procedure: Procedure(s):  Redo Minimally Invasive Left Lumbar 4/5 Microdiscectomy  Surgeon: Surgeon(s) and Role:     * Cecile Greenberg MD - Primary     * Marilynn Carmichael MD - Fellow - Assisting  Anesthesia: General   Estimated blood loss: Less than 10 ml  Drains: None  Specimens: * No specimens in log *  Findings:   Large disc fragment, level confirmed with xr.  Complications: None.  Implants: None    Plan:  - OOB as tolerate  - pain control  - reg diet  - anticipate dc home today

## 2019-04-02 NOTE — ANESTHESIA POSTPROCEDURE EVALUATION
Anesthesia POST Procedure Evaluation    Patient: Jaylin Day   MRN:     [id number] Gender:   female   Age:    38 year old :      1980        Preoperative Diagnosis: Lumbar Disc Herniation With Radiculopathy   Procedure(s):  Redo Minimally Invasive Left Lumbar 4/5 Microdiscectomy   Postop Comments: No value filed.       Anesthesia Type:  General    Reportable Event: NO     PAIN: Uncomplicated   Sign Out status: Comfortable, Well controlled pain     PONV: No PONV   Sign Out status:  No Nausea or Vomiting     Neuro/Psych: Uneventful perioperative course   Sign Out Status: Preoperative baseline; Age appropriate mentation     Airway/Resp.: Uneventful perioperative course   Sign Out Status: Non labored breathing, age appropriate RR; Resp. Status within EXPECTED Parameters     CV: Uneventful perioperative course   Sign Out status: Appropriate BP and perfusion indices; Appropriate HR/Rhythm     Disposition:   Sign Out in:  PACU  Disposition:  Phase II; Home  Recovery Course: Uneventful  Follow-Up: Not required           Last Anesthesia Record Vitals:  CRNA VITALS  2019 1022 - 2019 1122      2019             Resp Rate (observed):  3  (Abnormal)           Last PACU/Preop Vitals:  Vitals:    19 0748 19 0755 19 1100   BP:   (!) 124/96   Pulse: 102 101 93   Resp: 22 20 16   Temp:   36.7  C (98  F)   SpO2: 99% 100% 100%         Electronically Signed By: Rai Giles MD, 2019, 11:51 AM

## 2019-04-02 NOTE — DISCHARGE INSTRUCTIONS
[facility]   Adult Discharge Orders & Instructions     For 24 hours after surgery    1. Get plenty of rest.  A responsible adult must stay with you for at least 24 hours after you leave the hospital.   2. Do not drive or use heavy equipment.  If you have weakness or tingling, don't drive or use heavy equipment until this feeling goes away.  3. Do not drink alcohol.  4. Avoid strenuous or risky activities.  Ask for help when climbing stairs.   5. You may feel lightheaded.  IF so, sit for a few minutes before standing.  Have someone help you get up.   6. If you have nausea (feel sick to your stomach): Drink only clear liquids such as apple juice, ginger ale, broth or 7-Up.  Rest may also help.  Be sure to drink enough fluids.  Move to a regular diet as you feel able.  7. You may have a slight fever. Call the doctor if your fever is over 100 F (37.7 C) (taken under the tongue) or lasts longer than 24 hours.  8. You may have a dry mouth, a sore throat, muscle aches or trouble sleeping.  These should go away after 24 hours.  9. Do not make important or legal decisions.   Call your doctor for any of the followin.  Signs of infection (fever, growing tenderness at the surgery site, a large amount of drainage or bleeding, severe pain, foul-smelling drainage, redness, swelling).    2. It has been over 8 to 10 hours since surgery and you are still not able to urinate (pass water).    3.  Headache for over 24 hours.    4.  Numbness, tingling or weakness the day after surgery (if you had spinal anesthesia).  To contact a doctor, call Dr Greenberg at [phone] or if after hours, please call the hospital and ask the  for the Neuro resident on call

## 2019-04-02 NOTE — OR NURSING
Pt reports nasuea being resolved. Lower back incision CDI, pain controlled, VSS, no sob or chest pain reported. IV removed and pt taken to car via wheelchair.

## 2019-04-02 NOTE — PROVIDER NOTIFICATION
Pt c/o severe nausea, given 5 mg compazine, ice pack behind neck, aromatherapy sticker, and cool jimmy hugger.  Pt resting, neuro resident aware.  Requested a page when nausea resolved.

## 2019-04-02 NOTE — ANESTHESIA CARE TRANSFER NOTE
Patient: Jaylin Day    Procedure(s):  Redo Minimally Invasive Left Lumbar 4/5 Microdiscectomy    Diagnosis: Lumbar Disc Herniation With Radiculopathy  Diagnosis Additional Information: No value filed.    Anesthesia Type:   No value filed.     Note:  Airway :Face Mask  Patient transferred to:PACU  Handoff Report: Identifed the Patient, Identified the Reponsible Provider, Reviewed the pertinent medical history, Discussed the surgical course, Reviewed Intra-OP anesthesia mangement and issues during anesthesia, Set expectations for post-procedure period and Allowed opportunity for questions and acknowledgement of understanding      Vitals: (Last set prior to Anesthesia Care Transfer)    CRNA VITALS  4/2/2019 1022 - 4/2/2019 1058      4/2/2019             Resp Rate (observed):  3  (Abnormal)                 Electronically Signed By: BROWN Garcia CRNA  April 2, 2019  10:58 AM

## 2019-04-02 NOTE — OR NURSING
has been present in PreOp per pt request.  Present when Dr. Greenberg and resident spoke with pt.  Pt had taken pain meds at home at 0230 today. Pain meds wearing off in PreOp, pt experiencing severe left back and radiating left hip/leg  pain. Looking pale and almost tearful.  Dr. Tarvis notified. Orderded Fentanyl to be given in PreOP. Pt received total of 100mcg Fentanyl IV.  Reports getting decrease in the severe pain. Appears  calmer.  Placed on O2 sat monitor. O2 sats % on room air. Pt has been sitting up in recliner chair throughout PreOp.  Is too uncomfortable to lay in bed.  Pt wants to stay in chair until she goes into OR. CRNA, Amparo Adrian, updated on pt status in PreOP.

## 2019-04-02 NOTE — OR NURSING
Neuro team paged regarding ibuprofen.  Instructions states it can be used for pain control but the med reconciliation says to resume on 4/9/19.  Paged returned, pt can resume use in 2 days.   updated with information.  He verbally acknowledged understanding.

## 2019-04-02 NOTE — OR NURSING
Dr. Singleton notified that Comapazine was effective for patient's nausea. Per MD, he will send Rx for Compazine to discharge pharmacy for  by pt.

## 2019-04-02 NOTE — OP NOTE
Procedure Date: 04/02/2019      PREOPERATIVE DIAGNOSIS:  Left L4-5 disk herniation.      POSTOPERATIVE DIAGNOSIS:  Left L4-5 disk herniation.      PROCEDURE PERFORMED:   1.  Left L5-S1 minimally invasive hemilaminectomy and microdiskectomy.   2.  Use of intraoperative microscope.   3.  Use of intraoperative fluoroscopy.      ATTENDING SURGEON:  Cecile Greenberg MD      RESIDENT SURGEON:  Mariylnn Carmichael MD      ESTIMATED BLOOD LOSS:  Less than 5 mL.      ANESTHESIA:  General endotracheal anesthesia.      DRAINS:  No drains.      INDICATIONS FOR PROCEDURE:  Ms. Day is a 38-year-old woman who presented to our clinic with symptoms of left leg pain.  An MRI had been done and showed a large disk herniation.  She had a previous diskectomy at this level.  Unfortunately, her symptoms returned.  Because of this, she was scheduled for surgery at the end of April.  Unfortunately, her symptoms got significantly worse and she then presented to the ER on Sunday of this week.  We worked to get her on to the schedule for urgent treatment given that her pain was intractable and she presented today for the above-mentioned procedure.      DESCRIPTION OF PROCEDURE:  After informed consent was obtained, the patient was taken to the operating suite, where general endotracheal anesthesia was induced.  She was transferred prone to the Levi frame where all pressure points were padded adequately for the duration of the case.  Her previous incision was identified, but we used x-ray to confirm our level.  We then prepped and draped in the usual sterile fashion.  After an appropriate timeout, a #10 blade was used to make our skin incision.  The fascia was opened sharply.  We had also injected 10 mL of local anesthetic.  We then used the METRx dilating system to dilate over the L4-5 level.  When this was complete, we took another x-ray to confirm its localization.  We then mobilized the final tube with the snake arm at the edge of the bed.  We  then used a monopolar cautery to remove the muscle from the edge of the tube and we saw normal bone at the L4 level.  We used a high-speed drill to drill this off until we identified normal ligamentum flavum.  We then followed this inferiorly until we reached the L5 lamina.  When this was complete, we were able to identify the dura.  We continued resection carefully as there was significant scar tissue.  There was a small thin spot seen in the dura in between the nerve root and the thecal sac; however, there was no CSF leaking.  Thus, no repair was done.  We were able to identify the large disk fragment, which was displacing the nerve root and the thecal sac and sweep it to the side after breaking up some adhesions.  We then used a combination of curets and rongeurs to remove the disk fragment.  There were 2 large fragments that were removed.  We then used a hook to sweep under the thecal sac and felt no additional fragments.  We then irrigated copiously, obtained hemostasis and proceeded to close.  A small piece of Gelfoam was placed over the dural thin spot.  We then closed the fascia using an 0 Vicryl suture and then closed the deep dermal layer using 2-0 Vicryl suture and skin was closed using running subcuticular Monocryl.  Dermabond was applied to the skin and she was then transferred back to the surgical cart, extubated and taken to PACU in stable condition.      Dr. Delaney was present and scrubbed for the critical portion of the procedure and immediately available for the rest of the procedure.       I was present for the critical portions of the operation and immediately available for the remainder.  ANUSHA DELANEY MD       As dictated by VENANCIO RAMOS MD            D: 2019   T: 2019   MT: MELODY      Name:     ALAN KAISER   MRN:      [id number]        Account:        [id number]   :      1980           Procedure Date: 2019      Document: [id number]

## 2019-04-05 ENCOUNTER — DOCUMENTATION ONLY (OUTPATIENT)
Dept: OTHER | Facility: CLINIC | Age: 39
End: 2019-04-05

## 2019-04-11 ENCOUNTER — PATIENT OUTREACH (OUTPATIENT)
Dept: NEUROSURGERY | Facility: CLINIC | Age: 39
End: 2019-04-11

## 2019-04-11 NOTE — PROGRESS NOTES
Phone call to patient for routine post-op follow-up.      DOS: 4/2/2019  Procedure:  Left L4/5 MIS Microdisc  Surgeon:  Dionicio    No answer at listed number.  Left  with contact info and instructions to return call at patient's convenience.

## 2019-04-15 ENCOUNTER — PATIENT OUTREACH (OUTPATIENT)
Dept: NEUROSURGERY | Facility: CLINIC | Age: 39
End: 2019-04-15

## 2019-04-15 ASSESSMENT — ENCOUNTER SYMPTOMS
PARALYSIS: 0
MUSCLE CRAMPS: 1
BLOATING: 0
ARTHRALGIAS: 0
NAUSEA: 1
WEAKNESS: 1
JAUNDICE: 0
TINGLING: 1
STIFFNESS: 1
RECTAL PAIN: 0
NECK PAIN: 0
HEADACHES: 1
MUSCLE WEAKNESS: 1
NUMBNESS: 1
HEARTBURN: 0
LOSS OF CONSCIOUSNESS: 0
TREMORS: 0
CONSTIPATION: 0
MYALGIAS: 1
MEMORY LOSS: 0
JOINT SWELLING: 0
ABDOMINAL PAIN: 1
DISTURBANCES IN COORDINATION: 0
BLOOD IN STOOL: 0
DIZZINESS: 0
BACK PAIN: 0
SPEECH CHANGE: 0
SEIZURES: 0
BOWEL INCONTINENCE: 0
VOMITING: 0
DIARRHEA: 1

## 2019-04-15 NOTE — PROGRESS NOTES
Phone call to patient for routine post-op follow-up.       DOS: 4/2/2019  Procedure:  Left L4/5 MIS Microdisc  Surgeon:  Dionicio     No answer at listed number.  Left  with contact info and instructions to return call at patient's convenience.    Has scheduled post-op appointment 4/16/2019

## 2019-04-16 ENCOUNTER — OFFICE VISIT (OUTPATIENT)
Dept: NEUROSURGERY | Facility: CLINIC | Age: 39
End: 2019-04-16
Payer: COMMERCIAL

## 2019-04-16 VITALS
WEIGHT: 141.5 LBS | DIASTOLIC BLOOD PRESSURE: 74 MMHG | SYSTOLIC BLOOD PRESSURE: 107 MMHG | BODY MASS INDEX: 22.84 KG/M2 | OXYGEN SATURATION: 98 % | HEART RATE: 107 BPM

## 2019-04-16 DIAGNOSIS — Z98.890 POST-OPERATIVE STATE: ICD-10-CM

## 2019-04-16 DIAGNOSIS — M51.16 LUMBAR DISC HERNIATION WITH RADICULOPATHY: Primary | ICD-10-CM

## 2019-04-16 ASSESSMENT — PAIN SCALES - GENERAL: PAINLEVEL: MILD PAIN (2)

## 2019-04-16 NOTE — PROGRESS NOTES
NEUROSURGERY WOUND CHECK  April 16, 2019    ATTENDING:  ELAINA  PROCEDURE:04/02/2019   DIAGNOSIS:  Left L4-5 disk herniation.      PROCEDURE PERFORMED:   1.  Left  L4/5minimally invasive hemilaminectomy and microdiskectomy.       INDICATIONS FOR PROCEDURE:  Ms. Day is a 38-year-old woman who presented to our clinic with symptoms of left leg pain.  An MRI had been done and showed a large disk herniation.  She had a previous diskectomy at this level.  Unfortunately, her symptoms returned.  Because of this, she was scheduled for surgery at the end of April.  Unfortunately, her symptoms got significantly worse and she then presented to the ER on Sunday of this week.  We worked to get her on to the schedule for urgent treatment given that her pain was intractable and she presented today for the above-mentioned procedure.       HPI:  Jaylin Day is a pleasant 38 year old female now 2 weeks status post the above procedure.  The procedure itself was without incident.  She recovered well and was discharged home in good condition.  Since surgery her left leg pain is gone, still has minor numbness. Is off all medications.  She presents for routine post op visit.  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  No apparent distress. She is accompanied.  A&O X3.  Mood and affect WNL. Language and fund of knowledge intact.  Is able to sit and rise independently.   She has a nicely healing incision.  I removed the majority of the tissue glue in order to inspect the incision, no evidence of infection.  I gave her a couple of solvent packets to remove the rest of the tissue glue once she gets home.     ASSESSMENT/PLAN  1. Lumbar  disc herniation with radiculopathy    2. Post-operative state      Doing well now 2 weeks sp Left L4/5 minimally invasive hemilaminectomy and microdiskectomy.    Wound looks great.  May swim or immerse wound when all scabbing has disappeared.  Continue activity restrictions until 6 weeks post op.  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  Follow up NS:   See me in 4 weeks.  No  imaging           It has been a pleasure looking after this very nice patient. We appreciate your confidence in the [facility], Department of Neurosurgery.    Susan Barker PA-C  [facility]  Department of Neurosurgery  Phone: [phone]  Fax: [phone]      This note was generated using voice recognition software. While edited for content some inaccurate phrasing may be found.

## 2019-04-16 NOTE — LETTER
4/16/2019       RE: Jaylin Day  [address] [place] [address]     Dear Colleague,    Thank you for referring your patient, Jaylin Day, to the [facility] NEUROSURGERY at [facility]. Please see a copy of my visit note below.    NEUROSURGERY WOUND CHECK  April 16, 2019    ATTENDING:  ELAINA  PROCEDURE:04/02/2019   DIAGNOSIS:  Left L4-5 disk herniation.      PROCEDURE PERFORMED:   1.  Left   L4/5minimally invasive hemilaminectomy and microdiskectomy.       INDICATIONS FOR PROCEDURE:  Ms. Day is a 38-year-old woman who presented to our clinic with symptoms of left leg pain.  An MRI had been done and showed a large disk herniation.  She had a previous diskectomy at this level.  Unfortunately, her symptoms returned.  Because of this, she was scheduled for surgery at the end of April.  Unfortunately, her symptoms got significantly worse and she then presented to the ER on Sunday of this week.  We worked to get her on to the schedule for urgent treatment given that her pain was intractable and she presented today for the above-mentioned procedure.     HPI:  Jaylin Day is a pleasant 38 year old female now 2 weeks status post the above procedure.  The procedure itself was without incident.  She recovered well and was discharged home in good condition.  Since surgery her left leg pain is gone, still has minor numbness. Is off all medications.  She presents for routine post op visit.  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  No apparent distress. She is accompanied.  A&O X3.  Mood and affect WNL. Language and fund of knowledge intact.  Is able to sit and  rise independently.   She has a nicely healing incision.  I removed the majority of the tissue glue in order to inspect the incision, no evidence of infection.  I gave her a couple of solvent packets to remove the rest of the tissue glue once she gets home.     ASSESSMENT/PLAN  1. Lumbar disc herniation with radiculopathy    2. Post-operative state      Doing well now 2 weeks sp Left L4/5 minimally invasive hemilaminectomy and microdiskectomy.    Wound looks great.  May swim or immerse wound when all scabbing has disappeared.  Continue activity restrictions until 6 weeks post op.  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  Follow up NS:   See me in 4 weeks.  No  imaging           It has been a pleasure looking after this very nice patient. We appreciate your confidence in the [facility], Department of Neurosurgery.    Susan Barker PA-C  [facility]  Department of Neurosurgery  Phone: [phone]  Fax: [phone]    This note was generated using voice recognition software. While edited for content some inaccurate phrasing may be found.

## 2019-05-07 ENCOUNTER — PRE VISIT (OUTPATIENT)
Dept: NEUROSURGERY | Facility: CLINIC | Age: 39
End: 2019-05-07

## 2019-05-10 ENCOUNTER — OFFICE VISIT (OUTPATIENT)
Dept: PEDIATRICS | Facility: CLINIC | Age: 39
End: 2019-05-10
Payer: COMMERCIAL

## 2019-05-10 VITALS
WEIGHT: 149.2 LBS | DIASTOLIC BLOOD PRESSURE: 82 MMHG | BODY MASS INDEX: 24.08 KG/M2 | TEMPERATURE: 98 F | OXYGEN SATURATION: 99 % | HEART RATE: 91 BPM | SYSTOLIC BLOOD PRESSURE: 120 MMHG

## 2019-05-10 DIAGNOSIS — F43.22 ADJUSTMENT DISORDER WITH ANXIOUS MOOD: Primary | ICD-10-CM

## 2019-05-10 DIAGNOSIS — F51.02 ADJUSTMENT INSOMNIA: ICD-10-CM

## 2019-05-10 PROCEDURE — 99213 OFFICE O/P EST LOW 20 MIN: CPT | Performed by: FAMILY MEDICINE

## 2019-05-10 RX ORDER — HYDROXYZINE HYDROCHLORIDE 25 MG/1
12.5-25 TABLET, FILM COATED ORAL
Qty: 60 TABLET | Refills: 0 | Status: SHIPPED | OUTPATIENT
Start: 2019-05-10 | End: 2020-02-27

## 2019-05-10 RX ORDER — NITROFURANTOIN 25; 75 MG/1; MG/1
CAPSULE ORAL PRN
Refills: 0 | COMMUNITY
Start: 2019-01-02 | End: 2022-12-26

## 2019-05-10 ASSESSMENT — ANXIETY QUESTIONNAIRES
2. NOT BEING ABLE TO STOP OR CONTROL WORRYING: MORE THAN HALF THE DAYS
6. BECOMING EASILY ANNOYED OR IRRITABLE: SEVERAL DAYS
1. FEELING NERVOUS, ANXIOUS, OR ON EDGE: NEARLY EVERY DAY
3. WORRYING TOO MUCH ABOUT DIFFERENT THINGS: MORE THAN HALF THE DAYS
7. FEELING AFRAID AS IF SOMETHING AWFUL MIGHT HAPPEN: NOT AT ALL
5. BEING SO RESTLESS THAT IT IS HARD TO SIT STILL: SEVERAL DAYS
GAD7 TOTAL SCORE: 11

## 2019-05-10 ASSESSMENT — PAIN SCALES - GENERAL: PAINLEVEL: NO PAIN (0)

## 2019-05-10 ASSESSMENT — PATIENT HEALTH QUESTIONNAIRE - PHQ9
SUM OF ALL RESPONSES TO PHQ QUESTIONS 1-9: 13
5. POOR APPETITE OR OVEREATING: MORE THAN HALF THE DAYS

## 2019-05-10 NOTE — PROGRESS NOTES
SUBJECTIVE:   Jaylin Day is a 38 year old female who presents to clinic today for the following   health issues:    Abnormal Mood Symptoms    Patient who recently had lumbar surgery for lumbar radiculopathy days ago is here with unexplainable anxiety symptoms for the past few weeks since her surgery.  Patient states that she has healed from the surgery well with no complications, denies current pain  Denies concerns for depression but feels her  and children are noticing that she has been feeling irritable since her surgery  Patient finds it harder to get to sleep due to this restlessness, has tried melatonin with no relief of symptoms.  She denies previous history of anxiety, depression  Denies current use of alcohol, recreational drugs, panic attacks, suicidal ideations    Onset: since Dec 2019, back surgery 6 weeks ago and worse since then    Description:   Depression: YES- not sure, lack of motivation  Anxiety: YES    Accompanying Signs & Symptoms:  Still participating in activities that you used to enjoy: YES  Fatigue: YES- hard to get motivated, possibly due to lack of sleep  Irritability: YES  Difficulty concentrating: YES  Changes in appetite: YES- appetite increased  Problems with sleep: YES  Heart racing/beating fast : YES  Thoughts of hurting yourself or others: none    History:   Recent stress: YES- Back surgery 6 weeks ago  Prior depression hospitalization: None  Family history of depression: no  Family history of anxiety: no    Precipitating factors:   Alcohol/drug use: no    Alleviating factors:  None    Therapies Tried and outcome: about 20 years patient was on medication         Additional history: as documented    Reviewed  and updated as needed this visit by clinical staff         Reviewed and updated as needed this visit by Provider         Patient Active Problem List   Diagnosis     Recurrent UTI     CARDIOVASCULAR SCREENING; LDL GOAL LESS THAN 160     Disorder of bursae and  tendons in shoulder region     DUB (dysfunctional uterine bleeding)     S/P lumbar spine operation     Bilateral low back pain without sciatica, unspecified chronicity     History of lumbar surgery     Family history of thyroid disorder     Bilateral low back pain with left-sided sciatica     History of recurrent UTI (urinary tract infection)     Irregular menses     Migraine headache     Varicose vein of leg     Lumbar back pain with radiculopathy affecting left lower extremity     Lumbar disc herniation with radiculopathy     Past Surgical History:   Procedure Laterality Date     BACK SURGERY       DISCECTOMY LUMBAR MINIMALLY INVASIVE ONE LEVEL Left 2019    Procedure: Redo Minimally Invasive Left Lumbar 4/5 Microdiscectomy;  Surgeon: Cecile Greenberg MD;  Location: [facility] OR     left L4-5 hemilaminectomy, microdiskectomy         Social History     Tobacco Use     Smoking status: Former Smoker     Types: Cigarettes     Last attempt to quit: 2006     Years since quittin.3     Smokeless tobacco: Never Used   Substance Use Topics     Alcohol use: Yes     Comment: 3-4 drinks per week     Family History   Problem Relation Age of Onset     Hypertension Maternal Grandmother      Thyroid Disease Son         hypothyroidism, congenital     Hypertension Maternal Uncle      C.A.D. No family hx of      Diabetes No family hx of          Current Outpatient Medications   Medication Sig Dispense Refill     hydrOXYzine (ATARAX) 25 MG tablet Take 0.5-1 tablets (12.5-25 mg) by mouth nightly as needed for itching 60 tablet 0     nitroFURantoin macrocrystal-monohydrate (MACROBID) 100 MG capsule as needed  0     ibuprofen (ADVIL/MOTRIN) 800 MG tablet Take 1 tablet (800 mg) by mouth every 8 hours as needed for moderate pain (Patient not taking: Reported on 5/10/2019) 60 tablet 11     oxyCODONE (ROXICODONE) 5 MG tablet Take 1 tablet (5 mg) by mouth every 6 hours as needed for pain (Patient not taking: Reported on 5/10/2019)  45 tablet 0     Allergies   Allergen Reactions     Methocarbamol Hives     Recent Labs   Lab Test 01/30/19  1409 02/21/18  1005 04/27/12  0825 10/29/11  0940   LDL  --  103* 97  --    HDL  --  69 66  --    TRIG  --  83 84  --    ALT 29 21  --  27   CR  --  0.61  --  0.73   GFRESTIMATED  --  >90  --  >90   GFRESTBLACK  --  >90  --  >90   POTASSIUM  --  4.2  --  4.3   TSH  --  1.46 1.51  --       BP Readings from Last 3 Encounters:   05/10/19 120/82   04/16/19 107/74   04/02/19 125/77    Wt Readings from Last 3 Encounters:   05/10/19 67.7 kg (149 lb 3.2 oz)   04/16/19 64.2 kg (141 lb 8 oz)   04/02/19 64.7 kg (142 lb 10.2 oz)                  Labs reviewed in EPIC    ROS:  CONSTITUTIONAL: NEGATIVE for fever, chills, change in weight  RESP: NEGATIVE for significant cough or SOB  CV: NEGATIVE for chest pain, palpitations or peripheral edema  ENDOCRINE: NEGATIVE for temperature intolerance, skin/hair changes  Musculoskeletal-status post spine surgery 6 weeks ago  PSYCHIATRIC: as above    OBJECTIVE:     /82 (BP Location: Right arm, Patient Position: Sitting, Cuff Size: Adult Regular)   Pulse 91   Temp 98  F (36.7  C) (Oral)   Wt 67.7 kg (149 lb 3.2 oz)   LMP 04/21/2019 (Exact Date)   SpO2 99%   BMI 24.08 kg/m    Body mass index is 24.08 kg/m .  GENERAL: healthy, alert and no distress  NECK: no adenopathy, no asymmetry, masses, or scars and thyroid normal to palpation  RESP: lungs clear to auscultation - no rales, rhonchi or wheezes  CV: regular rate and rhythm, normal S1 S2, no S3 or S4, no murmur, click or rub, no peripheral edema and peripheral pulses strong  PSYCH: mentation appears normal, affect normal/bright    Diagnostic Test Results:  none     ASSESSMENT/PLAN:             1. Adjustment disorder with anxious mood  Likely from her recent postsurgical status and staying at home not working  I would recommend to try hydroxyzine to help with anxiety and sleep at night  She will my chart in 2 weeks on update  on her symptoms  Will consider small dose of Paxil or Prozac at bedtime  for persistent or worsening concerns  Dosing and potential medication side effects discussed.  Reviewed relaxation techniques  I do not feel she needs counseling at this time,  We will readdress and consider at the next visit in 5 to 6 weeks  for persistent or worsening concerns  - patient verbalised understanding and is agreeable to the plan.    - hydrOXYzine (ATARAX) 25 MG tablet; Take 0.5-1 tablets (12.5-25 mg) by mouth nightly as needed for itching  Dispense: 60 tablet; Refill: 0    2. Adjustment insomnia  as above    - hydrOXYzine (ATARAX) 25 MG tablet; Take 0.5-1 tablets (12.5-25 mg) by mouth nightly as needed for itching  Dispense: 60 tablet; Refill: 0    Chart documentation done in part with Dragon Voice recognition Software. Although reviewed after completion, some word and grammatical error may remain.    See Patient Instructions    Parminder Taveras MD  [facility]

## 2019-05-11 ASSESSMENT — ANXIETY QUESTIONNAIRES: GAD7 TOTAL SCORE: 11

## 2019-05-16 ENCOUNTER — OFFICE VISIT (OUTPATIENT)
Dept: NEUROSURGERY | Facility: CLINIC | Age: 39
End: 2019-05-16
Payer: COMMERCIAL

## 2019-05-16 VITALS
OXYGEN SATURATION: 99 % | RESPIRATION RATE: 16 BRPM | SYSTOLIC BLOOD PRESSURE: 128 MMHG | WEIGHT: 149.8 LBS | TEMPERATURE: 98.1 F | BODY MASS INDEX: 24.18 KG/M2 | HEART RATE: 91 BPM | DIASTOLIC BLOOD PRESSURE: 86 MMHG

## 2019-05-16 DIAGNOSIS — Z98.890 H/O LUMBOSACRAL SPINE SURGERY: ICD-10-CM

## 2019-05-16 DIAGNOSIS — M51.16 LUMBAR DISC HERNIATION WITH RADICULOPATHY: Primary | ICD-10-CM

## 2019-05-16 DIAGNOSIS — Z98.890 POST-OPERATIVE STATE: ICD-10-CM

## 2019-05-16 ASSESSMENT — ENCOUNTER SYMPTOMS
CHILLS: 0
POLYPHAGIA: 0
PANIC: 0
NIGHT SWEATS: 0
INCREASED ENERGY: 0
DECREASED CONCENTRATION: 1
FATIGUE: 0
FEVER: 1
WEIGHT GAIN: 1
INSOMNIA: 1
WEIGHT LOSS: 0
POLYDIPSIA: 0
HALLUCINATIONS: 0
NERVOUS/ANXIOUS: 1
ALTERED TEMPERATURE REGULATION: 0
DEPRESSION: 0
DECREASED APPETITE: 0

## 2019-05-16 ASSESSMENT — PAIN SCALES - GENERAL: PAINLEVEL: NO PAIN (0)

## 2019-05-16 NOTE — LETTER
5/16/2019       RE: Jaylin Day  [address] [place] [address]     Dear Colleague,    Thank you for referring your patient, Jaylin Day, to the [facility] NEUROSURGERY at [facility]. Please see a copy of my visit note below.    NEUROSURGERY PROGRESS NOTE    REASON FOR VISIT: 6 weeks postop follow up    HISTORY OF PRESENT ILLNESS: Ms. Day is a 38-year-old female with a history of left leg pain a/w a L4-5 disc herniation and s/p minimally invasive L4-5 discectomy with dura tear repaired by Dr. Aguayo in 9/2016. She did well after this until early 1/2019 when she developed recurrent pain in the left leg.  A  subsequent MRI showed a large left L4-5 disc herniation. She was scheduled for surgery at the end of 4/2019 but then presented to the [facility] ED on 3/31/2019 with worsening symptoms. She saw Dr. Greenberg and elected to undergo the surgery earlier on 4/2/2019 without complications.    The patient denies pain and/or weakness except of some numbness in the left leg. She has occasional back ache with increased activities. 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 She otherwise is fairly pleased with the outcome of surgery and wonders if she can increase physical activities from this point forward.    INTERVAL HEALTH HISTORY:  Past Medical History:   Diagnosis Date     Female pelvic pain 2008    possible mild bicornuate or septate uterus     Migraines 2004    resolved      Preeclampsia      Recurrent UTI          CURRENT MEDICATIONS:    Current Outpatient Medications:      hydrOXYzine (ATARAX) 25 MG tablet, Take 0.5-1 tablets (12.5-25 mg) by mouth nightly as needed for itching, Disp: 60 tablet, Rfl: 0     nitroFURantoin macrocrystal-monohydrate (MACROBID) 100 MG capsule, as needed, Disp: , Rfl: 0     ibuprofen (ADVIL/MOTRIN) 800 MG tablet, Take 1 tablet (800 mg) by mouth every 8 hours as needed for  moderate pain (Patient not taking: Reported on 5/10/2019), Disp: 60 tablet, Rfl: 11     oxyCODONE (ROXICODONE) 5 MG tablet, Take 1 tablet (5 mg) by mouth every 6 hours as needed for pain (Patient not taking: Reported on 5/10/2019), Disp: 45 tablet, Rfl: 0    ALLERGIES:  Methocarbamol    PHYSICAL EXAMINATION:  Filed Vitals:  /86 (BP Location: Left arm, Patient Position: Sitting, Cuff Size: Adult Regular)   Pulse 91   Temp 98.1  F (36.7  C) (Oral)   Resp 16   Wt 67.9 kg (149 lb 12.8 oz)   LMP 04/21/2019 (Exact Date)   SpO2 99%   BMI 24.18 kg/m        Patient Supplied Answers To the [facility] Pain Questionnaire  [facility] Pain -  Patient Entered Questionnaire/Answers 5/16/2019   What number best describes your pain right now:  0 = No pain  to  10 = Worst pain imaginable 1   How would you describe the pain? numbness, dull, aching, pressure   Which of the following worsen your pain? nothing worsens the pain   Which of the following improve or reduce your pain?  -   What number best describes your average pain for the past week:  0 = No pain  to  10 = Worst pain imaginable 1   What number best describes your LOWEST pain in past 24 hours:  0 = No pain  to  10 = Worst pain imaginable 1   What number best describes your WORST pain in past 24 hours:  0 = No pain  to  10 = Worst pain imaginable 1   When is your pain worst? Night   What non-medicine treatments have you already had for your pain? none   Have you tried treating your pain with medication?  No   Are you currently taking medications for your pain? No   General: Comfortable and relaxed  Focus Neurologic Exam:  Good lumbar ROM. Strength is 5/5 in lower extremities. DTRs in lower extremities are symmetric. Sensation is intact to light touch. Toes are downgoing. Station and gait are normal.  Incision: Nicely healed.      IMPRESSION AND PLAN: This is a 38 year old female who underwent left L4-5 minimally invasive hemilaminectomy and microdiskectomy for left lower  extremity is doing well.    I told her that she may resume normal daily activities, but continue to keep weight lift restriction under 10 lbs until she return for the next follow up visit with Dr. Greenberg in 1.5-2 months. She may also begin walking exercise, hiking on flat surface, do trade mills, and/or elliptical at the gym .    All the patient's questions have been answered and they demonstrate good understanding of the above.The patient has our contact information and is aware that she should call if she has questions comments or concerns.    Thank you very much for allowing us to participate in the care of this patient. Please do not hesitate to contact us with questions. We will keep you informed of her progress.      Tanna Mckoy DNP, APRN, FNP-BC  Department of Neurosurgery

## 2019-05-16 NOTE — PATIENT INSTRUCTIONS
Follow up with Dr. Greenberg in 6/2019.    Stay with 10 lbs weight lift restriction until you return to see Dr. Greenberg.    Call [phone] for concerns or questions.

## 2019-05-18 NOTE — PROGRESS NOTES
NEUROSURGERY PROGRESS NOTE      REASON FOR VISIT: 6 weeks postop follow up      HISTORY OF PRESENT ILLNESS: Ms. Day is a 38-year-old female with a history of left leg pain a/w a L4-5 disc herniation and s/p minimally invasive L4-5 discectomy with dura tear repaired by Dr. Aguayo in 9/2016. She did well after this until early 1/2019 when she developed recurrent pain in the left leg.  A subsequent MRI showed a large left L4-5 disc herniation. She was scheduled for surgery at the end of 4/2019 but then presented to the [facility] ED on 3/31/2019 with worsening symptoms. She saw Dr. Greenberg and elected to undergo the surgery earlier on 4/2/2019 without complications.    The patient denies pain and/or weakness except of some numbness in the left leg. She has occasional back ache with increased activities. 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 She otherwise is fairly pleased with the outcome of surgery and wonders if she can increase physical activities from this point forward.      INTERVAL HEALTH HISTORY:  Past Medical History:   Diagnosis Date     Female pelvic pain 2008    possible mild bicornuate or septate uterus     Migraines 2004    resolved      Preeclampsia      Recurrent UTI          CURRENT MEDICATIONS:    Current Outpatient Medications:      hydrOXYzine (ATARAX) 25 MG tablet, Take 0.5-1 tablets (12.5-25 mg) by mouth nightly as needed for itching, Disp: 60 tablet, Rfl: 0     nitroFURantoin macrocrystal-monohydrate (MACROBID) 100 MG capsule, as needed, Disp: , Rfl: 0     ibuprofen (ADVIL/MOTRIN) 800 MG tablet, Take 1 tablet (800 mg) by mouth every 8 hours as needed for moderate pain (Patient not taking: Reported on 5/10/2019), Disp: 60 tablet, Rfl: 11     oxyCODONE (ROXICODONE) 5 MG tablet, Take 1 tablet (5 mg) by mouth every 6 hours as needed for pain (Patient not taking: Reported on 5/10/2019), Disp: 45 tablet, Rfl:  0      ALLERGIES:  Methocarbamol    REVIEW OF SYSTEMS:  10 point ROS negative other than the symptoms noted above in the HPI and as entered by the patient below.  Answers for HPI/ROS submitted by the patient on 5/16/2019   Fever: Yes  Weight gain: Yes  Nervous or Anxious: Yes  Trouble sleeping: Yes  Trouble thinking or concentrating: Yes  Mood changes: Yes      PHYSICAL EXAMINATION:  Filed Vitals:  /86 (BP Location: Left arm, Patient Position: Sitting, Cuff Size: Adult Regular)   Pulse 91   Temp 98.1  F (36.7  C) (Oral)   Resp 16   Wt 67.9 kg (149 lb 12.8 oz)   LMP 04/21/2019 (Exact Date)   SpO2 99%   BMI 24.18 kg/m       Patient Supplied Answers To the [facility] Pain Questionnaire  [facility] Pain -  Patient Entered Questionnaire/Answers 5/16/2019   What number best describes your pain right now:  0 = No pain  to  10 = Worst pain imaginable 1   How would you describe the pain? numbness, dull, aching, pressure   Which of the following worsen your pain? nothing worsens the pain   Which of the following improve or reduce your pain?  -   What number best describes your average pain for the past week:  0 = No pain  to  10 = Worst pain imaginable 1   What number best describes your LOWEST pain in past 24 hours:  0 = No pain  to  10 = Worst pain imaginable 1   What number best describes your WORST pain in past 24 hours:  0 = No pain  to  10 = Worst pain imaginable 1   When is your pain worst? Night   What non-medicine treatments have you already had for your pain? none   Have you tried treating your pain with medication?  No   Are you currently taking medications for your pain? No   General: Comfortable and relaxed  Focus Neurologic Exam:  Good lumbar ROM. Strength is 5/5 in lower extremities. DTRs in lower extremities are symmetric. Sensation is intact to light touch. Toes are downgoing. Station and gait are normal.  Incision: Nicely healed.      IMPRESSION AND PLAN: This is a 38 year old female who underwent left L4-5  minimally invasive hemilaminectomy and microdiskectomy for left lower extremity is doing well.    I told her that she may resume normal daily activities, but continue to keep weight lift restriction under 10 lbs until she return for the next follow up visit with Dr. Greenberg in 1.5-2 months. She may also begin walking exercise, hiking on flat surface, do trade mills, and/or elliptical at the gym .    All the patient's questions have been answered and they demonstrate good understanding of the above.The patient has our contact information and is aware that she should call if she has questions comments or concerns.    Thank you very much for allowing us to participate in the care of this patient. Please do not hesitate to contact us with questions. We will keep you informed of her progress.        Tanna Mckoy DNP, APRN, FNP-BC  Department of Neurosurgery

## 2019-06-20 ENCOUNTER — OFFICE VISIT (OUTPATIENT)
Dept: NEUROSURGERY | Facility: CLINIC | Age: 39
End: 2019-06-20
Payer: COMMERCIAL

## 2019-06-20 VITALS
DIASTOLIC BLOOD PRESSURE: 84 MMHG | BODY MASS INDEX: 23.95 KG/M2 | OXYGEN SATURATION: 96 % | SYSTOLIC BLOOD PRESSURE: 110 MMHG | HEART RATE: 90 BPM | RESPIRATION RATE: 16 BRPM | WEIGHT: 149 LBS | HEIGHT: 66 IN | TEMPERATURE: 98 F

## 2019-06-20 DIAGNOSIS — M51.16 LUMBAR DISC HERNIATION WITH RADICULOPATHY: ICD-10-CM

## 2019-06-20 DIAGNOSIS — Z98.890 H/O LUMBOSACRAL SPINE SURGERY: ICD-10-CM

## 2019-06-20 DIAGNOSIS — Z98.890 POST-OPERATIVE STATE: ICD-10-CM

## 2019-06-20 ASSESSMENT — MIFFLIN-ST. JEOR: SCORE: 1359.67

## 2019-06-20 ASSESSMENT — PAIN SCALES - GENERAL: PAINLEVEL: NO PAIN (0)

## 2019-06-20 NOTE — NURSING NOTE
Chief Complaint   Patient presents with     RECHECK     [facility] RETURN 4 WK F/U       Hermes Zambrano, EMT

## 2019-06-20 NOTE — PROGRESS NOTES
NEUROSURGERY PROGRESS NOTE      REASON FOR VISIT: 2.5 months postop follow up      HISTORY OF PRESENT ILLNESS: Ms. Day is a 38-year-old female with a history of left leg pain a/w a L4-5 disc herniation and s/p minimally invasive L4-5 discectomy with dura tear repaired by Dr. Aguayo in 9/2016. She did well after this until early 1/2019 when she developed recurrent pain in the left leg.  A subsequent MRI showed a large left L4-5 disc herniation. She was scheduled for surgery at the end of 4/2019 but then presented to the [facility] ED on 3/31/2019 with worsening symptoms. She saw Dr. Greenberg and underwent Left L5-S1 minimally invasive hemilaminectomy and microdiskectomy on 4/2/2019 without complications.    Interval History: The patient states that she has been careful with her activties and restrict her weight lift to 10 lbs. She denies back or leg pain and/or weakness except of some numbness in the left leg. She is very pleased with the outcome of surgery. 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      INTERVAL HEALTH HISTORY:    Past Medical History:   Diagnosis Date     Female pelvic pain 2008    possible mild bicornuate or septate uterus     Migraines 2004    resolved      Preeclampsia      Recurrent UTI          CURRENT MEDICATIONS:    Current Outpatient Medications:      ibuprofen (ADVIL/MOTRIN) 800 MG tablet, Take 1 tablet (800 mg) by mouth every 8 hours as needed for moderate pain, Disp: 60 tablet, Rfl: 11     escitalopram (LEXAPRO) 10 MG tablet, Take 1 tablet (10 mg) by mouth daily (Patient not taking: Reported on 6/20/2019), Disp: 30 tablet, Rfl: 1     hydrOXYzine (ATARAX) 25 MG tablet, Take 0.5-1 tablets (12.5-25 mg) by mouth nightly as needed for itching (Patient not taking: Reported on 6/20/2019), Disp: 60 tablet, Rfl: 0     nitroFURantoin macrocrystal-monohydrate  "(MACROBID) 100 MG capsule, as needed, Disp: , Rfl: 0     oxyCODONE (ROXICODONE) 5 MG tablet, Take 1 tablet (5 mg) by mouth every 6 hours as needed for pain (Patient not taking: Reported on 5/10/2019), Disp: 45 tablet, Rfl: 0      ALLERGIES:  Methocarbamol    REVIEW OF SYSTEMS:  10 point ROS negative other than the symptoms noted above in the HPI and as entered by the patient below.  Answers for HPI/ROS submitted by the patient on 5/16/2019   Fever: Yes  Weight gain: Yes  Nervous or Anxious: Yes  Trouble sleeping: Yes  Trouble thinking or concentrating: Yes  Mood changes: Yes      PHYSICAL EXAMINATION:  Filed Vitals:  /84   Pulse 90   Temp 98  F (36.7  C) (Oral)   Resp 16   Ht 1.664 m (5' 5.5\")   Wt 67.6 kg (149 lb)   SpO2 96%   BMI 24.42 kg/m       General: Comfortable and relaxed  Focus Neurologic Exam:  Good lumbar ROM. Strength is 5/5 in lower extremities. DTRs in lower extremities are symmetric. Sensation is intact to light touch. Toes are downgoing. Station and gait are normal.  Incision: Nicely healed.      IMPRESSION AND PLAN: This is a 38 year old female who underwent left L4-5 minimally invasive hemilaminectomy and microdiskectomy for left lower extremity is doing well.    I told her that she may resume normal daily activities and no weight lift restrictions. She may go hiking or kayaking as able. We are comfortable in releasing her to PRN follow up.    All the patient's questions have been answered and they demonstrate good understanding of the above.The patient has our contact information and is aware that she should call if she has questions comments or concerns.    Thank you very much for allowing us to participate in the care of this patient. Please do not hesitate to contact us with questions. We will keep you informed of her progress.        Tanna Mckoy, DNP, APRN, FNP-BC  Department of Neurosurgery  "

## 2019-06-20 NOTE — LETTER
6/20/2019       RE: Jaylin Day  [address] [place] [address]     Dear Colleague,    Thank you for referring your patient, Jaylin Day, to the [facility] NEUROSURGERY at [facility]. Please see a copy of my visit note below.    NEUROSURGERY PROGRESS NOTE    REASON FOR VISIT: 2.5 months postop follow up    HISTORY OF PRESENT ILLNESS: Ms. Day is a 38-year-old female with a history of left leg pain a/w a L4-5 disc herniation and s/p minimally invasive L4-5 discectomy with dura tear repaired by Dr. Aguayo in 9/2016. She did well after this until early 1/2019 when she developed recurrent pain in the left leg.  A subsequent MRI showed a large left L4-5 disc herniation. She was scheduled for surgery at the end of 4/2019 but then presented to the [facility] ED on 3/31/2019 with worsening symptoms. She saw Dr. Greenberg and underwent Left L5-S1 minimally invasive hemilaminectomy and microdiskectomy on 4/2/2019 without complications.    Interval History: The patient states that she has been careful with her activties and restrict her weight lift to 10 lbs. She denies back or leg pain and/or weakness except of some numbness in the left leg. She is very pleased with the outcome of surgery. 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      INTERVAL HEALTH HISTORY:    Past Medical History:   Diagnosis Date     Female pelvic pain 2008    possible mild bicornuate or septate uterus     Migraines 2004    resolved      Preeclampsia      Recurrent UTI          CURRENT MEDICATIONS:    Current Outpatient Medications:      ibuprofen (ADVIL/MOTRIN) 800 MG tablet, Take 1 tablet (800 mg) by mouth every 8 hours as needed for moderate pain, Disp: 60 tablet, Rfl: 11     escitalopram (LEXAPRO) 10 MG tablet, Take 1 tablet (10 mg) by mouth daily  "(Patient not taking: Reported on 6/20/2019), Disp: 30 tablet, Rfl: 1     hydrOXYzine (ATARAX) 25 MG tablet, Take 0.5-1 tablets (12.5-25 mg) by mouth nightly as needed for itching (Patient not taking: Reported on 6/20/2019), Disp: 60 tablet, Rfl: 0     nitroFURantoin macrocrystal-monohydrate (MACROBID) 100 MG capsule, as needed, Disp: , Rfl: 0     oxyCODONE (ROXICODONE) 5 MG tablet, Take 1 tablet (5 mg) by mouth every 6 hours as needed for pain (Patient not taking: Reported on 5/10/2019), Disp: 45 tablet, Rfl: 0    ALLERGIES:  Methocarbamol    PHYSICAL EXAMINATION:  Filed Vitals:  /84   Pulse 90   Temp 98  F (36.7  C) (Oral)   Resp 16   Ht 1.664 m (5' 5.5\")   Wt 67.6 kg (149 lb)   SpO2 96%   BMI 24.42 kg/m        General: Comfortable and relaxed  Focus Neurologic Exam:  Good lumbar ROM. Strength is 5/5 in lower extremities. DTRs in lower extremities are symmetric. Sensation is intact to light touch. Toes are downgoing. Station and gait are normal.  Incision: Nicely healed.    IMPRESSION AND PLAN: This is a 38 year old female who underwent left L4-5 minimally invasive hemilaminectomy and microdiskectomy for left lower extremity is doing well.    I told her that she may resume normal daily activities and no weight lift restrictions. She may go hiking or kayaking as able. We are comfortable in releasing her to PRN follow up.     All the patient's questions have been answered and they demonstrate good understanding of the above.The patient has our contact information and is aware that she should call if she has questions comments or concerns.    Thank you very much for allowing us to participate in the care of this patient. Please do not hesitate to contact us with questions. We will keep you informed of her progress.      Tanna Mckoy, DNP, APRN, FNP-BC  Department of Neurosurgery    "

## 2019-08-09 NOTE — TELEPHONE ENCOUNTER
"\"I [facility] an appointment [facility] the travel clinic on Tuesday and they need my immunization records.  I recently changed from [facility] to [facility].\"  Writer gave pt the information in [facility] as requested.      Kendra Scanlon RN/FNA    " Statement Selected

## 2019-10-14 PROBLEM — M54.16 LUMBAR BACK PAIN WITH RADICULOPATHY AFFECTING LEFT LOWER EXTREMITY: Status: RESOLVED | Noted: 2019-02-11 | Resolved: 2019-10-14

## 2019-10-14 NOTE — PROGRESS NOTES
"Subjective:  HPI                    Objective:  System    Physical Exam    General     ROS    Assessment/Plan:    DISCHARGE REPORT    Progress reporting period is from 2/11/2019 to 2/27/2019.       SUBJECTIVE  Pt reports hip/leg is about the same as last week. Had a bad night and woke with 6-7/10 but after some Tylenol this am now down to a 4/10. Difficulty wanting to put weight on it but when does it doesn't like it. Hard to find a good position. Feels very tight/\"colin horse\" at L hip with tightness and won't relax. SL with flexion/rotation does feel like it opens her up and gives temporary relief but is not long lasting at all. Pt frustrated with lack of progress.    Current Pain level: 4/10.     Initial Pain level: (8-9/10 at worst).   Changes in function:  None  Adverse reaction to treatment or activity: None    OBJECTIVE  Changes noted in objective findings:  None  Objective: + B SLR with dec ROM on L; laciing 40 degeres on L;; LROM Ext moaj loss; B SGIS min loss with stinging R>L; Flex mod loss     ASSESSMENT/PLAN  Updated problem list and treatment plan: Diagnosis 1:  Lumbar radiuculopathy    Pain -  home program  Decreased ROM/flexibility - home program  Decreased function - home program  STG/LTGs have been met or progress has been made towards goals:  None  Assessment of Progress: The patient's condition is unchanged.  Self Management Plans:  Patient has been instructed in a home treatment program. Return to MD as no changes  Jaylin continues to require the following intervention to meet STG and LTG's:  PT intervention is no longer required to meet STG/LTG.    Recommendations:  This patient would benefit from further evaluation.  discontinue PT    Please refer to the daily flowsheet for treatment today, total treatment time and time spent performing 1:1 timed codes.          "

## 2020-02-23 ENCOUNTER — HEALTH MAINTENANCE LETTER (OUTPATIENT)
Age: 40
End: 2020-02-23

## 2020-02-27 ENCOUNTER — OFFICE VISIT (OUTPATIENT)
Dept: FAMILY MEDICINE | Facility: CLINIC | Age: 40
End: 2020-02-27
Payer: COMMERCIAL

## 2020-02-27 VITALS
DIASTOLIC BLOOD PRESSURE: 85 MMHG | SYSTOLIC BLOOD PRESSURE: 135 MMHG | OXYGEN SATURATION: 99 % | HEIGHT: 66 IN | TEMPERATURE: 98.2 F | BODY MASS INDEX: 26.2 KG/M2 | RESPIRATION RATE: 16 BRPM | HEART RATE: 110 BPM | WEIGHT: 163 LBS

## 2020-02-27 DIAGNOSIS — F51.05 INSOMNIA DUE TO OTHER MENTAL DISORDER: ICD-10-CM

## 2020-02-27 DIAGNOSIS — F99 INSOMNIA DUE TO OTHER MENTAL DISORDER: ICD-10-CM

## 2020-02-27 DIAGNOSIS — F41.8 SITUATIONAL ANXIETY: Primary | ICD-10-CM

## 2020-02-27 PROCEDURE — 99214 OFFICE O/P EST MOD 30 MIN: CPT | Performed by: PHYSICIAN ASSISTANT

## 2020-02-27 RX ORDER — SERTRALINE HYDROCHLORIDE 25 MG/1
25 TABLET, FILM COATED ORAL DAILY
Qty: 30 TABLET | Refills: 1 | Status: SHIPPED | OUTPATIENT
Start: 2020-02-27 | End: 2020-10-06

## 2020-02-27 ASSESSMENT — PAIN SCALES - GENERAL: PAINLEVEL: NO PAIN (0)

## 2020-02-27 ASSESSMENT — MIFFLIN-ST. JEOR: SCORE: 1423.17

## 2020-02-27 NOTE — PROGRESS NOTES
Subjective     Jaylin Day is a 39 year old female who presents to clinic today for the following health issues:    HPI     Anxiety Follow-Up    How are you doing with your anxiety since your last visit? Worsened , a lot of stress at work    Are you having other symptoms that might be associated with anxiety? Yes:  stress, eye is twitshing, insomnia    Have you had a significant life event? No     Are you feeling depressed? No    Do you have any concerns with your use of alcohol or other drugs? No    Social History     Tobacco Use     Smoking status: Former Smoker     Types: Cigarettes     Last attempt to quit: 2006     Years since quittin.1     Smokeless tobacco: Never Used   Substance Use Topics     Alcohol use: Yes     Comment: 3-4 drinks per week     Drug use: No     HARLEEN-7 SCORE 2018 5/10/2019 2020   Total Score 4 11 7     PHQ 2018 5/10/2019 2020   PHQ-9 Total Score 3 13 5   Q9: Thoughts of better off dead/self-harm past 2 weeks Not at all Not at all Not at all           Patient Active Problem List   Diagnosis     Recurrent UTI     CARDIOVASCULAR SCREENING; LDL GOAL LESS THAN 160     Disorder of bursae and tendons in shoulder region     DUB (dysfunctional uterine bleeding)     S/P lumbar spine operation     Bilateral low back pain without sciatica, unspecified chronicity     History of lumbar surgery     Family history of thyroid disorder     Bilateral low back pain with left-sided sciatica     History of recurrent UTI (urinary tract infection)     Irregular menses     Migraine headache     Varicose vein of leg     Lumbar disc herniation with radiculopathy     Past Surgical History:   Procedure Laterality Date     BACK SURGERY       DISCECTOMY LUMBAR MINIMALLY INVASIVE ONE LEVEL Left 2019    Procedure: Redo Minimally Invasive Left Lumbar 4/5 Microdiscectomy;  Surgeon: Cecile Greenberg MD;  Location: [facility] OR     left L4-5 hemilaminectomy, microdiskectomy  2016       Social  "History     Tobacco Use     Smoking status: Former Smoker     Types: Cigarettes     Last attempt to quit: 2006     Years since quittin.1     Smokeless tobacco: Never Used   Substance Use Topics     Alcohol use: Yes     Comment: 3-4 drinks per week     Family History   Problem Relation Age of Onset     Hypertension Maternal Grandmother      Thyroid Disease Son         hypothyroidism, congenital     Hypertension Maternal Uncle      C.A.D. No family hx of      Diabetes No family hx of          Current Outpatient Medications   Medication Sig Dispense Refill     ibuprofen (ADVIL/MOTRIN) 800 MG tablet Take 1 tablet (800 mg) by mouth every 8 hours as needed for moderate pain 60 tablet 11     nitroFURantoin macrocrystal-monohydrate (MACROBID) 100 MG capsule as needed  0     sertraline (ZOLOFT) 25 MG tablet Take 1 tablet (25 mg) by mouth daily 30 tablet 1     Allergies   Allergen Reactions     Methocarbamol Hives         Reviewed and updated as needed this visit by Provider  Tobacco  Allergies  Meds  Problems  Med Hx  Surg Hx  Fam Hx         Review of Systems   ROS COMP: Constitutional, HEENT, cardiovascular, pulmonary, gi and gu systems are negative, except as otherwise noted.      Objective    /85 (BP Location: Left arm, Patient Position: Sitting, Cuff Size: Adult Large)   Pulse 110   Temp 98.2  F (36.8  C) (Oral)   Resp 16   Ht 1.664 m (5' 5.5\")   Wt 73.9 kg (163 lb)   LMP 2020 (Exact Date)   SpO2 99%   BMI 26.71 kg/m    Body mass index is 26.71 kg/m .     Physical Exam   GENERAL: healthy, alert and no distress  EYES: Eyes grossly normal to inspection, PERRL and conjunctivae and sclerae normal  HENT: ear canals and TM's normal, nose and mouth without ulcers or lesions  NECK: no adenopathy, no asymmetry, masses, or scars and thyroid normal to palpation  RESP: lungs clear to auscultation - no rales, rhonchi or wheezes  CV: regular rate and rhythm, normal S1 S2, no S3 or S4, no murmur, " "click or rub, no peripheral edema and peripheral pulses strong  ABDOMEN: soft, nontender, no hepatosplenomegaly, no masses and bowel sounds normal  MS: no gross musculoskeletal defects noted, no edema  NEURO: Normal strength and tone, mentation intact and speech normal  PSYCH: mentation appears normal, affect normal/bright    Diagnostic Test Results:  Labs reviewed in Epic        Assessment & Plan       ICD-10-CM    1. Situational anxiety  F41.8 sertraline (ZOLOFT) 25 MG tablet   2. Insomnia due to other mental disorder  F51.05     F99      Sertraline 25 mg in evening  melatonin 5 mg at bedtime   Follow up in 6 weeks   Black box warning and SE were discussed    BMI:   Estimated body mass index is 26.71 kg/m  as calculated from the following:    Height as of this encounter: 1.664 m (5' 5.5\").    Weight as of this encounter: 73.9 kg (163 lb).   Weight management plan: Discussed healthy diet and exercise guidelines      Return in about 6 weeks (around 4/9/2020).    Leti Dozier PA-C  [facility]      "

## 2020-02-28 ENCOUNTER — MYC MEDICAL ADVICE (OUTPATIENT)
Dept: FAMILY MEDICINE | Facility: CLINIC | Age: 40
End: 2020-02-28

## 2020-02-28 ASSESSMENT — ANXIETY QUESTIONNAIRES
7. FEELING AFRAID AS IF SOMETHING AWFUL MIGHT HAPPEN: NOT AT ALL
3. WORRYING TOO MUCH ABOUT DIFFERENT THINGS: MORE THAN HALF THE DAYS
GAD7 TOTAL SCORE: 7
2. NOT BEING ABLE TO STOP OR CONTROL WORRYING: SEVERAL DAYS
6. BECOMING EASILY ANNOYED OR IRRITABLE: SEVERAL DAYS
IF YOU CHECKED OFF ANY PROBLEMS ON THIS QUESTIONNAIRE, HOW DIFFICULT HAVE THESE PROBLEMS MADE IT FOR YOU TO DO YOUR WORK, TAKE CARE OF THINGS AT HOME, OR GET ALONG WITH OTHER PEOPLE: SOMEWHAT DIFFICULT
5. BEING SO RESTLESS THAT IT IS HARD TO SIT STILL: NOT AT ALL
1. FEELING NERVOUS, ANXIOUS, OR ON EDGE: SEVERAL DAYS

## 2020-02-28 ASSESSMENT — PATIENT HEALTH QUESTIONNAIRE - PHQ9
SUM OF ALL RESPONSES TO PHQ QUESTIONS 1-9: 5
5. POOR APPETITE OR OVEREATING: MORE THAN HALF THE DAYS

## 2020-02-29 ASSESSMENT — ANXIETY QUESTIONNAIRES: GAD7 TOTAL SCORE: 7

## 2020-03-25 PROBLEM — M54.42 BILATERAL LOW BACK PAIN WITH LEFT-SIDED SCIATICA: Status: RESOLVED | Noted: 2018-03-07 | Resolved: 2020-03-25

## 2020-03-25 NOTE — PROGRESS NOTES
Patient did not return for further treatment and no additional progress was noted.  Please refer to the progress note and goal flowsheet completed on 05/02/18 for discharge information.

## 2020-10-03 DIAGNOSIS — F41.8 SITUATIONAL ANXIETY: ICD-10-CM

## 2020-10-06 RX ORDER — SERTRALINE HYDROCHLORIDE 25 MG/1
TABLET, FILM COATED ORAL
Qty: 30 TABLET | Refills: 1 | Status: SHIPPED | OUTPATIENT
Start: 2020-10-06 | End: 2020-10-19 | Stop reason: DRUGHIGH

## 2020-10-06 NOTE — TELEPHONE ENCOUNTER
PHQ-9 score:    PHQ 2/28/2020   PHQ-9 Total Score 5   Q9: Thoughts of better off dead/self-harm past 2 weeks Not at all     Prescription approved per [facility].      Warnings Override History for sertraline (ZOLOFT) 25 MG tablet [[id number]]    Overridden by Leti Dozier PA-C on Feb 27, 2020 12:07 PM   Drug-Drug   1. IBUPROFEN / SELECTIVE SEROTONIN REUPTAKE INHIBITORS [Level: Major]   Other Orders: ibuprofen (ADVIL/MOTRIN) 800 MG tablet          Saima Shane RN  [facility]

## 2020-10-19 ENCOUNTER — VIRTUAL VISIT (OUTPATIENT)
Dept: PEDIATRICS | Facility: CLINIC | Age: 40
End: 2020-10-19
Payer: COMMERCIAL

## 2020-10-19 DIAGNOSIS — F43.22 ADJUSTMENT DISORDER WITH ANXIOUS MOOD: Primary | ICD-10-CM

## 2020-10-19 DIAGNOSIS — N39.0 RECURRENT UTI: ICD-10-CM

## 2020-10-19 DIAGNOSIS — F51.02 ADJUSTMENT INSOMNIA: ICD-10-CM

## 2020-10-19 PROCEDURE — 99214 OFFICE O/P EST MOD 30 MIN: CPT | Mod: 95 | Performed by: FAMILY MEDICINE

## 2020-10-19 RX ORDER — HYDROXYZINE HYDROCHLORIDE 25 MG/1
12.5-25 TABLET, FILM COATED ORAL
Qty: 30 TABLET | Refills: 1 | Status: SHIPPED | OUTPATIENT
Start: 2020-10-19 | End: 2022-12-16

## 2020-10-19 RX ORDER — NITROFURANTOIN 25; 75 MG/1; MG/1
CAPSULE ORAL
Qty: 90 CAPSULE | Refills: 1 | Status: SHIPPED | OUTPATIENT
Start: 2020-10-19 | End: 2022-12-16

## 2020-10-19 NOTE — PATIENT INSTRUCTIONS
Increase Zoloft from 25 mg to 50 mg daily in the evening  Take hydroxyzine half to 1 tablet as needed at bedtime for sleep  Schedule for recheck through video visit in 2 months  Schedule for physical, fasting labs and mammogram in February 2021

## 2020-10-19 NOTE — PROGRESS NOTES
"Jaylin Day is a 40 year old female who is being evaluated via a billable video visit.      The patient has been notified of following:     \"This video visit will be conducted via a call between you and your physician/provider. We have found that certain health care needs can be provided without the need for an in-person physical exam.  This service lets us provide the care you need with a video conversation.  If a prescription is necessary we can send it directly to your pharmacy.  If lab work is needed we can place an order for that and you can then stop by our lab to have the test done at a later time.    Video visits are billed at different rates depending on your insurance coverage.  Please reach out to your insurance provider with any questions.    If during the course of the call the physician/provider feels a video visit is not appropriate, you will not be charged for this service.\"    Patient has given verbal consent for Video visit? Yes  How would you like to obtain your AVS? MyChart  If you are dropped from the video visit, the video invite should be resent to: Text to cell phone: [phone]  Will anyone else be joining your video visit? No    Subjective     Jaylin Day is a 40 year old female who presents today via video visit for the following health issues:    HPI    Patient is here for video visit instead of in person visit due to the current COVID-19 pandemic.  Patient is here for follow-up on her anxiety  She just started taking the Zoloft 25 mg for the past month, at the same time, need changes and lifestyle including decreasing caffeine and, started on regular exercises and has been feeling slightly better  Occasionally has trouble getting to sleep.  Does not have depression, panic attacks  Patient noticed left eye twitching and some chest tightness and anxiety was for last month, feels symptoms have been better since she started on Zoloft.  Drinks 1 to 2 glasses of wine over the " weekends  Denies use of tobacco, recreational drugs  Patient uses Macrobid after sex to avoid recurrent UTI, is requesting refills on that.    Anxiety Follow-Up    How are you doing with your depression since your last visit? Started taking Zoloft 20. Decreases caffeine and decreased carbs. Felt that helped some but still having eye twitching and chest tightness so she started the medication. Thinks she may need to increase dose as all symptoms have not resolved although feeling somewhat better.     How are you doing with your anxiety since your last visit?  See above    Are you having other symptoms that might be associated with depression or anxiety? Yes:  left eye twitching and chest tightness    Have you had a significant life event? No     Do you have any concerns with your use of alcohol or other drugs? No    Social History     Tobacco Use     Smoking status: Former Smoker     Types: Cigarettes     Quit date: 2006     Years since quittin.8     Smokeless tobacco: Never Used   Substance Use Topics     Alcohol use: Yes     Comment: 3-4 drinks per week     Drug use: No     PHQ 2018 5/10/2019 2020   PHQ-9 Total Score 3 13 5   Q9: Thoughts of better off dead/self-harm past 2 weeks Not at all Not at all Not at all     HARLEEN-7 SCORE 2018 5/10/2019 2020   Total Score 4 11 7         Suicide Assessment Five-step Evaluation and Treatment (SAFE-T)               Video Start Time: 1:38pm        Review of Systems   CONSTITUTIONAL: NEGATIVE for fever, chills, change in weight  RESP: NEGATIVE for significant cough or SOB  CV: NEGATIVE for chest pain, palpitations or peripheral edema  GI: NEGATIVE for nausea, abdominal pain, heartburn, or change in bowel habits  : History of recurrent UTI  MUSCULOSKELETAL: NEGATIVE for significant arthralgias or myalgia  PSYCHIATRIC: as above      Objective           Vitals:  No vitals were obtained today due to virtual visit.    Physical Exam     GENERAL:  "Healthy, alert and no distress  RESP: No audible wheeze, cough, or visible cyanosis.  No visible retractions or increased work of breathing.    PSYCH: Mentation appears normal, affect normal/bright, judgement and insight intact, normal speech and appearance well-groomed.              Assessment & Plan     Adjustment disorder with anxious mood  Needing improvement  Recommended to increase the dose of Zoloft from 25 mg to 50 mg daily  Improve sleep  as mentioned below  Follow-up for recheck in 2 months video visit or sooner if needed  - sertraline (ZOLOFT) 50 MG tablet; Take 1 tablet (50 mg) by mouth daily New dose    Adjustment insomnia  Expect improvement with improving anxiety as mentioned above  Prescription sent for hydroxyzine to take half to 1 tablet as needed at bedtime for sleep  Dosing and potential medication side effects discussed.  Patient verbalised understanding and is agreeable to the plan.    - hydrOXYzine (ATARAX) 25 MG tablet; Take 0.5-1 tablets (12.5-25 mg) by mouth nightly as needed for anxiety or other (sleep)    Recurrent UTI  Prescription refill sent on Macrobid for history of recurrent UTI  - nitroFURantoin macrocrystal-monohydrate (MACROBID) 100 MG capsule; Take one capsule as needed after intercourse     BMI:   Estimated body mass index is 26.71 kg/m  as calculated from the following:    Height as of 2/27/20: 1.664 m (5' 5.5\").    Weight as of 2/27/20: 73.9 kg (163 lb).            Chart documentation done in part with Dragon Voice recognition Software. Although reviewed after completion, some word and grammatical error may remain.    See Patient Instructions    Return in about 2 months (around 12/19/2020).    Parminder Taveras MD  [facility]      Video-Visit Details    Type of service:  Video Visit    Video End Time:1:50 PM    Originating Location (pt. Location): Home    Distant Location (provider location):  [facility]     Platform used " for Video Visit: Taryn

## 2020-10-21 ENCOUNTER — TELEPHONE (OUTPATIENT)
Dept: PEDIATRICS | Facility: CLINIC | Age: 40
End: 2020-10-21

## 2020-10-21 NOTE — TELEPHONE ENCOUNTER
Left message for patient to return clinic call regarding scheduling. Patient needs a virtual return  appointment for 2 month recheck with Parminder Taveras MD around 12/19/2020. Number to clinic and Mychart option given, please assist in scheduling once patient returns clinic call.     Call Center OKAY TO SCHEDULE.    Thanks,   Nury Vincent  Primary Care   [facility] [place]

## 2020-11-24 ENCOUNTER — MYC MEDICAL ADVICE (OUTPATIENT)
Dept: PEDIATRICS | Facility: CLINIC | Age: 40
End: 2020-11-24

## 2020-11-24 NOTE — LETTER
December 1, 2020      Jaylin Day  [address] [place] [address]        Dear Jaylin Day,    In order to ensure that we are providing the best quality care, we would like to remind you that you are due for a Virtual Return appointment with Parminder Taveras MD around 12/19/2020.      We have been unable to reach you by phone or [facility] [employer]. Please call your clinic or use [facility] to make an appointment with your provider. Please let us know if you have any questions and we would be happy to help.     Thank you for trusting us with your care.    Sincerely,     [facility]  [phone]

## 2020-12-01 NOTE — TELEPHONE ENCOUNTER
3rd attempt, chart letter created and sent.    Nury Vincent  Primary Care   [facility] [place]

## 2020-12-12 ENCOUNTER — HEALTH MAINTENANCE LETTER (OUTPATIENT)
Age: 40
End: 2020-12-12

## 2021-04-11 ENCOUNTER — HEALTH MAINTENANCE LETTER (OUTPATIENT)
Age: 41
End: 2021-04-11

## 2021-04-11 DIAGNOSIS — F43.22 ADJUSTMENT DISORDER WITH ANXIOUS MOOD: ICD-10-CM

## 2021-04-12 NOTE — TELEPHONE ENCOUNTER
3-month supply sent.    Patient is due for physical, Pap fasting labs , med recheck  please schedule for annual physical and fasting labs

## 2021-04-12 NOTE — TELEPHONE ENCOUNTER
Routing refill request to provider for review/approval because:  Drug interaction warning    Juliann Noe BSN, RN

## 2021-04-13 ENCOUNTER — ANCILLARY PROCEDURE (OUTPATIENT)
Dept: GENERAL RADIOLOGY | Facility: CLINIC | Age: 41
End: 2021-04-13
Attending: PHYSICIAN ASSISTANT
Payer: COMMERCIAL

## 2021-04-13 ENCOUNTER — OFFICE VISIT (OUTPATIENT)
Dept: URGENT CARE | Facility: URGENT CARE | Age: 41
End: 2021-04-13
Payer: COMMERCIAL

## 2021-04-13 VITALS
HEART RATE: 85 BPM | DIASTOLIC BLOOD PRESSURE: 80 MMHG | OXYGEN SATURATION: 99 % | TEMPERATURE: 97.5 F | BODY MASS INDEX: 28.12 KG/M2 | WEIGHT: 171.6 LBS | SYSTOLIC BLOOD PRESSURE: 128 MMHG

## 2021-04-13 DIAGNOSIS — S69.91XA WRIST INJURY, RIGHT, INITIAL ENCOUNTER: ICD-10-CM

## 2021-04-13 DIAGNOSIS — S69.91XA WRIST INJURY, RIGHT, INITIAL ENCOUNTER: Primary | ICD-10-CM

## 2021-04-13 PROCEDURE — 73110 X-RAY EXAM OF WRIST: CPT | Mod: RT | Performed by: RADIOLOGY

## 2021-04-13 PROCEDURE — 99213 OFFICE O/P EST LOW 20 MIN: CPT | Performed by: PHYSICIAN ASSISTANT

## 2021-04-13 ASSESSMENT — PAIN SCALES - GENERAL: PAINLEVEL: MODERATE PAIN (5)

## 2021-04-13 NOTE — PROGRESS NOTES
Chief Complaint   Patient presents with     Musculoskeletal Problem     Pt fell and hurt right wrist about 1 hour ago             Medical Decision Making:    Differential Diagnosis:  MS Injury Pain: sprain, fracture, tendonitis, muscle strain, contusion and dislocation    Xrays- I see no obvious fracture.   ? Increased space between ulnar and carpal bone    Results for orders placed or performed in visit on 04/13/21   XR Wrist Right G/E 3 Views     Status: None    Narrative    XR WRIST RT G/E 3 VW 4/13/2021 6:43 PM     HISTORY: fell. Pain ulnar wrist and 5th metacarpal; Wrist injury,  right, initial encounter      Impression    IMPRESSION: Negative exam.    KENNY PONCE MD           ASSESSMENT:    ICD-10-CM    1. Wrist injury, right, initial encounter  S69.91XA XR Wrist Right G/E 3 Views           PLAN: Ice, elevate, splint, NSAIDs. F/U Ortho 1 week. Unable to rule out ligamentous injury to ulnar wrist.  Advised about symptoms which might herald more serious problems.            Maite Campos PA-C      SUBJECTIVE:   Jaylin Day is an 40 year old female who presents right wrist injury that occurred tonight.  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  She is right-handed.  She reports pain over the ulnar wrist and fifth metacarpal.  No fever.      Allergies   Allergen Reactions     Methocarbamol Hives       Past Medical History:   Diagnosis Date     Female pelvic pain 2008    possible mild bicornuate or septate uterus     Migraines 2004    resolved      Preeclampsia      Recurrent UTI        ibuprofen (ADVIL/MOTRIN) 800 MG tablet, Take 1 tablet (800 mg) by mouth every 8 hours as needed for moderate pain  nitroFURantoin macrocrystal-monohydrate (MACROBID) 100 MG capsule, Take one capsule as needed after intercourse  sertraline (ZOLOFT) 50 MG tablet, TAKE 1 TABLET(50 MG) BY MOUTH DAILY  hydrOXYzine (ATARAX) 25 MG tablet, Take 0.5-1 tablets (12.5-25 mg) by mouth  nightly as needed for anxiety or other (sleep) (Patient not taking: Reported on 4/13/2021)  nitroFURantoin macrocrystal-monohydrate (MACROBID) 100 MG capsule, as needed    No current facility-administered medications on file prior to visit.       Social History     Tobacco Use     Smoking status: Former Smoker     Types: Cigarettes     Quit date: 1/1/2006     Years since quitting: 15.2     Smokeless tobacco: Never Used   Substance Use Topics     Alcohol use: Yes     Comment: 3-4 drinks per week     Drug use: No       ROS:  Gen: no fevers  Musculoskel: + as above  Skin: as above    OBJECTIVE:  /80 (BP Location: Left arm, Patient Position: Sitting, Cuff Size: Adult Regular)   Pulse 85   Temp 97.5  F (36.4  C) (Tympanic)   Wt 77.8 kg (171 lb 9.6 oz)   SpO2 99%   BMI 28.12 kg/m     General:   awake, alert, and cooperative.  NAD.   Head: Normocephalic, atraumatic.  Eyes: Conjunctiva clear,   MS: Right wrist is with swelling over the ulnar volar wrist.  Mild decreased range of motion of the wrist in all planes.  Tenderness in this location.  Also tender along the fifth metacarpal up to the pinky proximal phalanx.  Full range of motion at the pinky MCP, PIP and DIP joints..  Good palpable radial pulse.  Capillary refill intact.  Sensation to soft touch intact.  Neuro: Alert and oriented - normal speech.      Maite Campos PA-C

## 2021-05-31 ENCOUNTER — OFFICE VISIT (OUTPATIENT)
Dept: URGENT CARE | Facility: URGENT CARE | Age: 41
End: 2021-05-31
Payer: COMMERCIAL

## 2021-05-31 ENCOUNTER — ANCILLARY PROCEDURE (OUTPATIENT)
Dept: GENERAL RADIOLOGY | Facility: CLINIC | Age: 41
End: 2021-05-31
Attending: PREVENTIVE MEDICINE
Payer: COMMERCIAL

## 2021-05-31 VITALS
DIASTOLIC BLOOD PRESSURE: 79 MMHG | TEMPERATURE: 98.7 F | SYSTOLIC BLOOD PRESSURE: 124 MMHG | BODY MASS INDEX: 27.65 KG/M2 | HEART RATE: 108 BPM | WEIGHT: 168.7 LBS | OXYGEN SATURATION: 98 % | RESPIRATION RATE: 20 BRPM

## 2021-05-31 DIAGNOSIS — R05.9 COUGH: Primary | ICD-10-CM

## 2021-05-31 PROCEDURE — 99213 OFFICE O/P EST LOW 20 MIN: CPT | Performed by: PREVENTIVE MEDICINE

## 2021-05-31 PROCEDURE — 71046 X-RAY EXAM CHEST 2 VIEWS: CPT | Performed by: RADIOLOGY

## 2021-05-31 RX ORDER — CODEINE PHOSPHATE AND GUAIFENESIN 10; 100 MG/5ML; MG/5ML
1 SOLUTION ORAL EVERY 6 HOURS PRN
Qty: 118 ML | Refills: 0 | Status: SHIPPED | OUTPATIENT
Start: 2021-05-31 | End: 2022-12-16

## 2021-05-31 RX ORDER — ALBUTEROL SULFATE 90 UG/1
2 AEROSOL, METERED RESPIRATORY (INHALATION) EVERY 6 HOURS PRN
Qty: 6.7 G | Refills: 1 | Status: SHIPPED | OUTPATIENT
Start: 2021-05-31 | End: 2022-12-16

## 2021-05-31 ASSESSMENT — PAIN SCALES - GENERAL: PAINLEVEL: NO PAIN (0)

## 2021-05-31 NOTE — PROGRESS NOTES
Assessment & Plan       1. Cough  Likely bronchospasm and post viral  Honey, albuterol and robitussin ac advised  Follow up if not improving in 2 weeks  - XR Chest 2 Views  - albuterol (PROAIR HFA/PROVENTIL HFA/VENTOLIN HFA) 108 (90 Base) MCG/ACT inhaler; Inhale 2 puffs into the lungs every 6 hours as needed for shortness of breath / dyspnea or wheezing  Dispense: 6.7 g; Refill: 1  - guaiFENesin-codeine (ROBITUSSIN AC) 100-10 MG/5ML solution; Take 5 mLs by mouth every 6 hours as needed for cough  Dispense: 118 mL; Refill: 0         No follow-ups on file.    Braulio Kuo MD  [facility]    Subjective     Jaylin Day is a 41 year old year old female who presents to clinic today for the following health issues:    Patient presents with:  Cough: had positive covid in end April-still cough and heavy in chest, SOB when talking, want to make sure lungs are okay-tried OTC mucunex DM, severe cold/flu cough, left over benzonatate from daughter     This is a 42 yo female with a history of covid 3 weeks ago who presents with cough that has been ongoing since she got covid.  She currently has no fever.  Cough is dry, no hemoptysis, wheezing, sob, cp, pleurisy.  Has tried tessalon perles, robitussin dm without help.  No congestion, post nasal drip  No leg swelling.  No personal or family hx of dvt/pe.    Patient Active Problem List   Diagnosis     Recurrent UTI     CARDIOVASCULAR SCREENING; LDL GOAL LESS THAN 160     Disorder of bursae and tendons in shoulder region     DUB (dysfunctional uterine bleeding)     S/P lumbar spine operation     Bilateral low back pain without sciatica, unspecified chronicity     History of lumbar surgery     Family history of thyroid disorder     History of recurrent UTI (urinary tract infection)     Irregular menses     Migraine headache     Varicose vein of leg     Lumbar disc herniation with radiculopathy       Current Outpatient Medications   Medication      albuterol (PROAIR HFA/PROVENTIL HFA/VENTOLIN HFA) 108 (90 Base) MCG/ACT inhaler     guaiFENesin-codeine (ROBITUSSIN AC) 100-10 MG/5ML solution     ibuprofen (ADVIL/MOTRIN) 800 MG tablet     nitroFURantoin macrocrystal-monohydrate (MACROBID) 100 MG capsule     hydrOXYzine (ATARAX) 25 MG tablet     nitroFURantoin macrocrystal-monohydrate (MACROBID) 100 MG capsule     sertraline (ZOLOFT) 50 MG tablet     No current facility-administered medications for this visit.        Past Medical History:   Diagnosis Date     Female pelvic pain 2008    possible mild bicornuate or septate uterus     Migraines 2004    resolved      Preeclampsia      Recurrent UTI        Social History   reports that she quit smoking about 15 years ago. Her smoking use included cigarettes. She has never used smokeless tobacco. She reports current alcohol use. She reports that she does not use drugs.    Family History   Problem Relation Age of Onset     Hypertension Maternal Grandmother      Thyroid Disease Son         hypothyroidism, congenital     Hypertension Maternal Uncle      C.A.D. No family hx of      Diabetes No family hx of        Review of Systems  Constitutional, HEENT, cardiovascular, pulmonary, GI, , musculoskeletal, neuro, skin, endocrine and psych systems are negative, except as otherwise noted.      Objective    /79 (BP Location: Right arm, Patient Position: Sitting, Cuff Size: Adult Regular)   Pulse 108   Temp 98.7  F (37.1  C) (Oral)   Resp 20   Wt 76.5 kg (168 lb 11.2 oz)   LMP 05/13/2021 (Exact Date)   SpO2 98%   BMI 27.65 kg/m    Physical Exam   GENERAL: healthy, alert and no distress  EYES: Eyes grossly normal to inspection, PERRL and conjunctivae and sclerae normal  HENT: ear canals and TM's normal, nose and mouth without ulcers or lesions  NECK: no adenopathy, no asymmetry, masses, or scars and thyroid normal to palpation  RESP: lungs clear to auscultation - no rales, rhonchi or wheezes  CV: regular rate and  rhythm, normal S1 S2, no S3 or S4, no murmur, click or rub, no peripheral edema and peripheral pulses strong  ABDOMEN: soft, nontender, no hepatosplenomegaly, no masses and bowel sounds normal  MS: no gross musculoskeletal defects noted, no edema  SKIN: no suspicious lesions or rashes  NEURO: Normal strength and tone, mentation intact and speech normal  PSYCH: mentation appears normal, affect normal/bright    CXR - Reviewed and interpreted by me Normal- no infiltrates, effusions, pneumothoraces, cardiomegaly or masses

## 2021-05-31 NOTE — PATIENT INSTRUCTIONS
1. Cough  Likely bronchospasm and post viral  Honey, albuterol and robitussin ac advised  Follow up if not improving in 2 weeks  - XR Chest 2 Views  - albuterol (PROAIR HFA/PROVENTIL HFA/VENTOLIN HFA) 108 (90 Base) MCG/ACT inhaler; Inhale 2 puffs into the lungs every 6 hours as needed for shortness of breath / dyspnea or wheezing  Dispense: 6.7 g; Refill: 1  - guaiFENesin-codeine (ROBITUSSIN AC) 100-10 MG/5ML solution; Take 5 mLs by mouth every 6 hours as needed for cough  Dispense: 118 mL; Refill: 0

## 2021-09-26 ENCOUNTER — HEALTH MAINTENANCE LETTER (OUTPATIENT)
Age: 41
End: 2021-09-26

## 2021-10-23 NOTE — ED TRIAGE NOTES
Patient presents with c/o increasing back pain, left hip pain, left leg pain and intermittent numbness in left leg x 3 days. Pt is scheduled to have surgery on L4 and L5 on April 29th. Last 3 days. Taking flexeril and ibuprofen and tylenol at home with little relief.    23-Oct-2021 13:38

## 2022-04-14 NOTE — TELEPHONE ENCOUNTER
Procedure(s):  URO LIFT.     general    Anesthesia Post Evaluation      Multimodal analgesia: multimodal analgesia used between 6 hours prior to anesthesia start to PACU discharge  Patient location during evaluation: PACU  Patient participation: complete - patient participated  Level of consciousness: awake and alert  Pain management: adequate  Airway patency: patent  Anesthetic complications: no  Cardiovascular status: acceptable  Respiratory status: acceptable  Hydration status: acceptable  Post anesthesia nausea and vomiting:  controlled  Final Post Anesthesia Temperature Assessment:  Normothermia (36.0-37.5 degrees C)      INITIAL Post-op Vital signs:   Vitals Value Taken Time   /79 04/14/22 1004   Temp 36.7 °C (98 °F) 04/14/22 1004   Pulse 61 04/14/22 1004   Resp 18 04/14/22 1004   SpO2 98 % 04/14/22 1004 Sent patient Pallavi's response through a My Chart message.  Adia Rangel MA/  [employer] [facility] [place]

## 2022-05-08 ENCOUNTER — HEALTH MAINTENANCE LETTER (OUTPATIENT)
Age: 42
End: 2022-05-08

## 2022-09-30 ENCOUNTER — ANCILLARY PROCEDURE (OUTPATIENT)
Dept: MAMMOGRAPHY | Facility: CLINIC | Age: 42
End: 2022-09-30
Attending: FAMILY MEDICINE
Payer: COMMERCIAL

## 2022-09-30 DIAGNOSIS — Z12.31 VISIT FOR SCREENING MAMMOGRAM: ICD-10-CM

## 2022-09-30 PROCEDURE — 77063 BREAST TOMOSYNTHESIS BI: CPT | Mod: GC

## 2022-09-30 PROCEDURE — 77067 SCR MAMMO BI INCL CAD: CPT | Mod: GC

## 2022-12-15 ASSESSMENT — ENCOUNTER SYMPTOMS
PARESTHESIAS: 0
DYSURIA: 0
COUGH: 0
SORE THROAT: 0
MYALGIAS: 0
ARTHRALGIAS: 0
HEARTBURN: 0
FEVER: 0
FREQUENCY: 0
DIZZINESS: 0
HEADACHES: 0
HEMATURIA: 0
JOINT SWELLING: 0
NERVOUS/ANXIOUS: 0
WEAKNESS: 0
SHORTNESS OF BREATH: 0
DIARRHEA: 0
BREAST MASS: 0
EYE PAIN: 0
CONSTIPATION: 0
ABDOMINAL PAIN: 0
CHILLS: 0
HEMATOCHEZIA: 0
PALPITATIONS: 0
NAUSEA: 0

## 2022-12-16 ENCOUNTER — OFFICE VISIT (OUTPATIENT)
Dept: OBGYN | Facility: CLINIC | Age: 42
End: 2022-12-16
Payer: COMMERCIAL

## 2022-12-16 VITALS
WEIGHT: 164.8 LBS | HEIGHT: 66 IN | SYSTOLIC BLOOD PRESSURE: 119 MMHG | BODY MASS INDEX: 26.48 KG/M2 | HEART RATE: 80 BPM | DIASTOLIC BLOOD PRESSURE: 82 MMHG

## 2022-12-16 DIAGNOSIS — Z01.419 WELL FEMALE EXAM WITH ROUTINE GYNECOLOGICAL EXAM: Primary | ICD-10-CM

## 2022-12-16 DIAGNOSIS — Z12.4 ENCOUNTER FOR SCREENING FOR CERVICAL CANCER: ICD-10-CM

## 2022-12-16 DIAGNOSIS — Z13.6 CARDIOVASCULAR SCREENING; LDL GOAL LESS THAN 160: ICD-10-CM

## 2022-12-16 DIAGNOSIS — Z28.21 INFLUENZA VACCINATION DECLINED: ICD-10-CM

## 2022-12-16 DIAGNOSIS — Z83.49 FAMILY HISTORY OF THYROID DISORDER: ICD-10-CM

## 2022-12-16 LAB
CHOLEST SERPL-MCNC: 172 MG/DL
FASTING STATUS PATIENT QL REPORTED: NO
HBA1C MFR BLD: 5.6 % (ref 0–5.6)
HDLC SERPL-MCNC: 75 MG/DL
LDLC SERPL CALC-MCNC: 78 MG/DL
NONHDLC SERPL-MCNC: 97 MG/DL
TRIGL SERPL-MCNC: 97 MG/DL
TSH SERPL DL<=0.005 MIU/L-ACNC: 1.26 MU/L (ref 0.4–4)

## 2022-12-16 PROCEDURE — 87624 HPV HI-RISK TYP POOLED RSLT: CPT | Performed by: OBSTETRICS & GYNECOLOGY

## 2022-12-16 PROCEDURE — G0145 SCR C/V CYTO,THINLAYER,RESCR: HCPCS | Performed by: OBSTETRICS & GYNECOLOGY

## 2022-12-16 PROCEDURE — 99386 PREV VISIT NEW AGE 40-64: CPT | Performed by: OBSTETRICS & GYNECOLOGY

## 2022-12-16 PROCEDURE — 83036 HEMOGLOBIN GLYCOSYLATED A1C: CPT | Performed by: OBSTETRICS & GYNECOLOGY

## 2022-12-16 PROCEDURE — 80061 LIPID PANEL: CPT | Performed by: OBSTETRICS & GYNECOLOGY

## 2022-12-16 PROCEDURE — 36415 COLL VENOUS BLD VENIPUNCTURE: CPT | Performed by: OBSTETRICS & GYNECOLOGY

## 2022-12-16 PROCEDURE — 84443 ASSAY THYROID STIM HORMONE: CPT | Performed by: OBSTETRICS & GYNECOLOGY

## 2022-12-16 ASSESSMENT — ENCOUNTER SYMPTOMS
HEARTBURN: 0
JOINT SWELLING: 0
PALPITATIONS: 0
MYALGIAS: 0
NERVOUS/ANXIOUS: 0
HEADACHES: 0
HEMATURIA: 0
FREQUENCY: 0
CONSTIPATION: 0
EYE PAIN: 0
DIARRHEA: 0
PARESTHESIAS: 0
SHORTNESS OF BREATH: 0
NAUSEA: 0
CHILLS: 0
WEAKNESS: 0
SORE THROAT: 0
COUGH: 0
DYSURIA: 0
HEMATOCHEZIA: 0
ABDOMINAL PAIN: 0
FEVER: 0
ARTHRALGIAS: 0
BREAST MASS: 0
DIZZINESS: 0

## 2022-12-16 NOTE — PATIENT INSTRUCTIONS
If you have labs or imaging done, the results will automatically release in [email or web address] without an interpretation.  Your health care professional will review those results and send an interpretation with recommendations as soon as possible, but this may be 1-3 business days.    If you have any questions regarding your visit, please contact your care team.     [employer] [facility]: [phone]  [facility] CLINIC HOURS TELEPHONE NUMBER       Sayra Juan MD  Assistant Medical Director    [employer] - Certified Medical Assistant     Renaldo Booth-JERRY Olivo-  Sera-     Monday- [place]  8:00 a.m - 5:00 p.m    Tuesday- Surgery        Thursday- [place]  8:00 a.m - 5:00 p.m.    Friday- [place]  7:30 a.m - 4:00 p.m. [facility]  [address]  [place], [place] [address] [phone] Fax  Imaging Scheduling [phone]    [facility]  [address] [place] [address] [address].  [place], [place] [address] [phone]    [address] [address] [phone] Fax  Imaging Scheduling [phone]     Urgent Care locations:  [facility] Monday-Friday  10 am - 8 pm  Saturday and Sunday   9 am - 5 pm  Monday-Friday   10 am - 8 pm  Saturday and Sunday   9 am - 5 pm   [phone]     **Surgeries** Our Surgery Schedulers will contact you to schedule. If you do not receive a call within 3 business days, please call [phone].    If you need a medication refill, please contact your pharmacy. Please allow 3 business days for your refill to be completed.    As always, thank you for trusting us with your healthcare needs!

## 2022-12-16 NOTE — PROGRESS NOTES
SUBJECTIVE:   CC: Shelley is an 42 year old who presents for preventive health visit.   Patient has been advised of split billing requirements and indicates understanding: Yes  Healthy Habits:     Getting at least 3 servings of Calcium per day:  Yes    Bi-annual eye exam:  Yes    Dental care twice a year:  Yes    Sleep apnea or symptoms of sleep apnea:  None    Diet:  Regular (no restrictions)    Frequency of exercise:  2-3 days/week    Duration of exercise:  30-45 minutes    Taking medications regularly:  Yes    Medication side effects:  Not applicable    PHQ-2 Total Score: 0    Additional concerns today:  Yes     - History of GHTN. No history of GDM.  Her periods are normal.  She has irritability 1-2 weeks before her period.  Her sex drive has slowly decreased over the last year, more so in the last 6 months.   Contraception - vasectomy         Today's PHQ-2 Score:   PHQ-2 (  [employer]) 12/15/2022   Q1: Little interest or pleasure in doing things 0   Q2: Feeling down, depressed or hopeless 0   PHQ-2 Score 0   PHQ-2 Total Score (12-17 Years)- Positive if 3 or more points; Administer PHQ-A if positive -   Q1: Little interest or pleasure in doing things Not at all   Q2: Feeling down, depressed or hopeless Not at all   PHQ-2 Score 0           Social History     Tobacco Use     Smoking status: Former     Types: Cigarettes     Quit date: 2006     Years since quittin.9     Smokeless tobacco: Never   Substance Use Topics     Alcohol use: Yes     Comment: 3-4 drinks per week     If you drink alcohol do you typically have >3 drinks per day or >7 drinks per week? No    No flowsheet data found.    BP Readings from Last 3 Encounters:   22 119/82   21 124/79   21 128/80    Wt Readings from Last 3 Encounters:   22 74.8 kg (164 lb 12.8 oz)   21 76.5 kg (168 lb 11.2 oz)   21 77.8 kg (171 lb 9.6 oz)                  Patient Active Problem List   Diagnosis     Recurrent UTI      CARDIOVASCULAR SCREENING; LDL GOAL LESS THAN 160     Disorder of bursae and tendons in shoulder region     DUB (dysfunctional uterine bleeding)     S/P lumbar spine operation     Bilateral low back pain without sciatica, unspecified chronicity     History of lumbar surgery     Family history of thyroid disorder     History of recurrent UTI (urinary tract infection)     Irregular menses     Migraine headache     Varicose vein of leg     Lumbar disc herniation with radiculopathy     Past Surgical History:   Procedure Laterality Date     BACK SURGERY       DISCECTOMY LUMBAR MINIMALLY INVASIVE ONE LEVEL Left 2019    Procedure: Redo Minimally Invasive Left Lumbar 4/5 Microdiscectomy;  Surgeon: Cecile Greenberg MD;  Location: [facility] OR     left L4-5 hemilaminectomy, microdiskectomy         Social History     Tobacco Use     Smoking status: Former     Types: Cigarettes     Quit date: 2006     Years since quittin.9     Smokeless tobacco: Never   Substance Use Topics     Alcohol use: Yes     Comment: 3-4 drinks per week     Family History   Problem Relation Age of Onset     Hypertension Maternal Grandmother      Thyroid Disease Son         hypothyroidism, congenital     Hypertension Maternal Uncle      C.A.D. No family hx of      Diabetes No family hx of          Current Outpatient Medications   Medication Sig Dispense Refill     nitroFURantoin macrocrystal-monohydrate (MACROBID) 100 MG capsule as needed  0     Allergies   Allergen Reactions     Methocarbamol Hives       Breast Cancer Screening:    FHS-7:   Breast CA Risk Assessment (FHS-7) 2022   Did any of your first-degree relatives have breast or ovarian cancer? No   Did any of your relatives have bilateral breast cancer? No   Did any man in your family have breast cancer? No   Did any woman in your family have breast and ovarian cancer? No   Did any woman in your family have breast cancer before age 50 y? No   Do you have 2 or more relatives with  "breast and/or ovarian cancer? No   Do you have 2 or more relatives with breast and/or bowel cancer? No       Mammogram Screening - Offered annual screening and updated Health Maintenance for [place] plan based on risk factor consideration    Pertinent mammograms are reviewed under the imaging tab.    History of abnormal Pap smear: NO - age 30-65 PAP every 5 years with negative HPV co-testing recommended  PAP / HPV Latest Ref Rng & Units 2/7/2018 4/27/2012   PAP (Historical) - NIL NIL   HPV16 NEG:Negative Negative -   HPV18 NEG:Negative Negative -   HRHPV NEG:Negative Negative -     Review of Systems   Constitutional: Negative for chills and fever.   HENT: Negative for congestion, ear pain, hearing loss and sore throat.    Eyes: Negative for pain and visual disturbance.   Respiratory: Negative for cough and shortness of breath.    Cardiovascular: Negative for chest pain, palpitations and peripheral edema.   Gastrointestinal: Negative for abdominal pain, constipation, diarrhea, heartburn, hematochezia and nausea.   Breasts:  Negative for tenderness, breast mass and discharge.   Genitourinary: Negative for dysuria, frequency, genital sores, hematuria, pelvic pain, urgency, vaginal bleeding and vaginal discharge.   Musculoskeletal: Negative for arthralgias, joint swelling and myalgias.   Skin: Negative for rash.   Neurological: Negative for dizziness, weakness, headaches and paresthesias.   Psychiatric/Behavioral: Negative for mood changes. The patient is not nervous/anxious.           OBJECTIVE:   /82 (BP Location: Right arm, Cuff Size: Adult Regular)   Pulse 80   Ht 1.664 m (5' 5.5\")   Wt 74.8 kg (164 lb 12.8 oz)   LMP 12/01/2022 (Exact Date)   BMI 27.01 kg/m    Physical Exam  Gen: Alert and oriented times 3, no acute distress.  Well developed, well nourished, pleasant.    Neck: Supple, no masses.  No thyromegaly.  Breast: Symmetrical without lesions.  No dimpling, nipple discharge, or discrete masses.  No " lymphadenopathy.  Chest:  Non labored.  Clear to auscultation bilaterally.    Heart: Regular, normal S1, S2.  No murmurs.   Abdomen: Soft, nontender, nondistended.  No hepatosplenomegaly.    :  Normal female external genitalia.  No lesions.  Urethral meatus normal.  Speculum exam reveals a normal vaginal vault, normal cervix.  No abnormal discharge.  Bimanual exam reveals a normal, mobile, nontender uterus.  No cervical motion tenderness.  Adnexa nontender with no palpable masses.    Extremities:  Nontender, no edema.    Pap obtained:  Yes     ASSESSMENT/PLAN:       ICD-10-CM    1. Well female exam with routine gynecological exam  Z01.419 Hemoglobin A1c      2. Encounter for screening for cervical cancer  Z12.4 Pap screen with HPV - recommended age 30 - 65 years      3. Influenza vaccination declined  Z28.21       4. CARDIOVASCULAR SCREENING; LDL GOAL LESS THAN 160  Z13.6 Lipid panel reflex to direct LDL Non-fasting      5. Family history of thyroid disorder  Z83.49 TSH with free T4 reflex              COUNSELING:  Reviewed preventive health counseling, as reflected in patient instructions        She reports that she quit smoking about 16 years ago. Her smoking use included cigarettes. She has never used smokeless tobacco.          Sayra Juan MD  [facility] [place]

## 2022-12-20 LAB
BKR LAB AP GYN ADEQUACY: NORMAL
BKR LAB AP GYN INTERPRETATION: NORMAL
BKR LAB AP HPV REFLEX: NORMAL
BKR LAB AP PREVIOUS ABNORMAL: NORMAL
PATH REPORT.COMMENTS IMP SPEC: NORMAL
PATH REPORT.COMMENTS IMP SPEC: NORMAL
PATH REPORT.RELEVANT HX SPEC: NORMAL

## 2022-12-22 LAB
HUMAN PAPILLOMA VIRUS 16 DNA: NEGATIVE
HUMAN PAPILLOMA VIRUS 18 DNA: NEGATIVE
HUMAN PAPILLOMA VIRUS FINAL DIAGNOSIS: NORMAL
HUMAN PAPILLOMA VIRUS OTHER HR: NEGATIVE

## 2022-12-26 ENCOUNTER — MYC REFILL (OUTPATIENT)
Dept: FAMILY MEDICINE | Facility: CLINIC | Age: 42
End: 2022-12-26

## 2022-12-26 DIAGNOSIS — N39.0 RECURRENT UTI: Primary | ICD-10-CM

## 2022-12-29 NOTE — TELEPHONE ENCOUNTER
Please ask patient how much, how often, and what she takes this medicine for.  I saw her once for her annual preventative health visit and she did not mention this at the time of the visit.

## 2022-12-29 NOTE — TELEPHONE ENCOUNTER
Pt saw Dr. Juan.  She has not seen Dr. Taveras in person since 2019, had one virtual vist in 2020. Not appropriate for refill.   Will route to Ob-gyn for refill.

## 2022-12-30 RX ORDER — NITROFURANTOIN 25; 75 MG/1; MG/1
100 CAPSULE ORAL PRN
Qty: 30 CAPSULE | Refills: 3 | Status: SHIPPED | OUTPATIENT
Start: 2022-12-30

## 2023-01-08 ENCOUNTER — HEALTH MAINTENANCE LETTER (OUTPATIENT)
Age: 43
End: 2023-01-08

## 2023-04-26 ENCOUNTER — OFFICE VISIT (OUTPATIENT)
Dept: URGENT CARE | Facility: URGENT CARE | Age: 43
End: 2023-04-26
Payer: COMMERCIAL

## 2023-04-26 VITALS
BODY MASS INDEX: 27.94 KG/M2 | OXYGEN SATURATION: 98 % | SYSTOLIC BLOOD PRESSURE: 117 MMHG | WEIGHT: 170.5 LBS | TEMPERATURE: 98 F | HEART RATE: 91 BPM | DIASTOLIC BLOOD PRESSURE: 74 MMHG

## 2023-04-26 DIAGNOSIS — H69.91 DYSFUNCTION OF RIGHT EUSTACHIAN TUBE: ICD-10-CM

## 2023-04-26 DIAGNOSIS — H66.91 RIGHT ACUTE OTITIS MEDIA: ICD-10-CM

## 2023-04-26 DIAGNOSIS — R07.0 THROAT PAIN: Primary | ICD-10-CM

## 2023-04-26 LAB
DEPRECATED S PYO AG THROAT QL EIA: NEGATIVE
GROUP A STREP BY PCR: NOT DETECTED

## 2023-04-26 PROCEDURE — 87651 STREP A DNA AMP PROBE: CPT | Performed by: STUDENT IN AN ORGANIZED HEALTH CARE EDUCATION/TRAINING PROGRAM

## 2023-04-26 PROCEDURE — 99213 OFFICE O/P EST LOW 20 MIN: CPT | Performed by: STUDENT IN AN ORGANIZED HEALTH CARE EDUCATION/TRAINING PROGRAM

## 2023-04-26 RX ORDER — FLUTICASONE PROPIONATE 50 MCG
1 SPRAY, SUSPENSION (ML) NASAL DAILY
Qty: 11.1 ML | Refills: 0 | Status: SHIPPED | OUTPATIENT
Start: 2023-04-26

## 2023-04-26 NOTE — PROGRESS NOTES
ASSESSMENT & PLAN:   Diagnoses and all orders for this visit:  Throat pain  -     Streptococcus A Rapid Screen w/Reflex to PCR - Clinic Collect  -     Group A Streptococcus PCR Throat Swab  Right acute otitis media  -     amoxicillin-clavulanate (AUGMENTIN) 875-125 MG tablet; Take 1 tablet by mouth 2 times daily for 5 days  Dysfunction of right eustachian tube  -     fluticasone (FLONASE) 50 MCG/ACT nasal spray; Spray 1 spray into both nostrils daily    Right AOM - start augmentin x 5 days. Likely some eustachian tube dysfunction from recent flight - advised flonase daily. Rapid strep negative, PCR pending. Follow-up with new/worsening symptoms.    No follow-ups on file.    There are no Patient Instructions on file for this visit.    At the end of the encounter, I discussed results, diagnosis, medications. Discussed red flags for immediate return to clinic/ER, as well as indications for follow up if no improvement. Patient and/or caregiver understood and agreed to plan. Patient was stable for discharge.    ------------------------------------------------------------------------  SUBJECTIVE  Patient presents with:  Pharyngitis: Sore throat started Saturday. Two negative covid test on Saturday and Monday. Pt returned from [place] on Saturday  Ear Problem: Right ear pain, drainage     HPI  Jaylin Day is a(n) 42 year old female presenting to urgent care for sore throat x 4 days. Also has right ear pain, post nasal drip. No congestion, fever, cough. Recent travel to [place], flew back the morning of symptom onset.    Review of Systems    Current Outpatient Medications   Medication Sig Dispense Refill     amoxicillin-clavulanate (AUGMENTIN) 875-125 MG tablet Take 1 tablet by mouth 2 times daily for 5 days 10 tablet 0     fluticasone (FLONASE) 50 MCG/ACT nasal spray Spray 1 spray into both nostrils daily 11.1 mL 0     nitroFURantoin macrocrystal-monohydrate (MACROBID) 100 MG capsule Take 1 capsule (100 mg) by mouth as  needed (UTI prevention) (Patient not taking: Reported on 4/26/2023) 30 capsule 3     Problem List:  2019-03: Lumbar disc herniation with radiculopathy  2019-02: Lumbar back pain with radiculopathy affecting left lower   extremity  2018-03: Bilateral low back pain with left-sided sciatica  2018-02: S/P lumbar spine operation  2018-02: Bilateral low back pain without sciatica, unspecified   chronicity  2018-02: History of lumbar surgery  2018-02: Family history of thyroid disorder  2014-04: History of recurrent UTI (urinary tract infection)  2014-04: Irregular menses  2014-04: Migraine headache  2014-04: Varicose vein of leg  2012-08: DUB (dysfunctional uterine bleeding)  2011-05: Disorder of bursae and tendons in shoulder region  2010-10: CARDIOVASCULAR SCREENING; LDL GOAL LESS THAN 160  Recurrent UTI    Allergies   Allergen Reactions     Methocarbamol Hives         OBJECTIVE  Vitals:    04/26/23 1528   BP: 117/74   BP Location: Left arm   Patient Position: Sitting   Cuff Size: Adult Regular   Pulse: 91   Temp: 98  F (36.7  C)   TempSrc: Tympanic   SpO2: 98%   Weight: 77.3 kg (170 lb 8 oz)     Physical Exam   GENERAL: healthy, alert, no acute distress.   HEAD: normocephalic, atraumatic.  EYE: PERRL. EOMs intact. No scleral injection bilaterally.   EAR: external ear normal. Bilateral ear canals normal and nonpainful. Right TM slightly erythematous and bulging. Left TM intact, pearly, translucent without bulging.  NOSE: external nose atraumatic without lesions.  OROPHARYNX: moist mucous membranes. Posterior oropharynx with mild erythema. No exudate. Uvula midline. Patent airway.  LUNGS: no increased work of breathing. Clear lung sounds bilaterally. No wheezing, rhonchi, or rales.   CV: regular rate and rhythm. No clicks, murmurs, or rubs.    Results for orders placed or performed in visit on 04/26/23   Streptococcus A Rapid Screen w/Reflex to PCR - Clinic Collect     Status: Normal    Specimen: Throat; Swab   Result  Value Ref Range    Group A Strep antigen Negative Negative

## 2023-11-12 ENCOUNTER — ANCILLARY PROCEDURE (OUTPATIENT)
Dept: GENERAL RADIOLOGY | Facility: CLINIC | Age: 43
End: 2023-11-12
Attending: STUDENT IN AN ORGANIZED HEALTH CARE EDUCATION/TRAINING PROGRAM
Payer: COMMERCIAL

## 2023-11-12 ENCOUNTER — OFFICE VISIT (OUTPATIENT)
Dept: URGENT CARE | Facility: URGENT CARE | Age: 43
End: 2023-11-12
Payer: COMMERCIAL

## 2023-11-12 VITALS
HEART RATE: 90 BPM | OXYGEN SATURATION: 98 % | DIASTOLIC BLOOD PRESSURE: 80 MMHG | BODY MASS INDEX: 27.33 KG/M2 | SYSTOLIC BLOOD PRESSURE: 115 MMHG | TEMPERATURE: 98.6 F | WEIGHT: 166.8 LBS

## 2023-11-12 DIAGNOSIS — M54.6 ACUTE BILATERAL THORACIC BACK PAIN: Primary | ICD-10-CM

## 2023-11-12 DIAGNOSIS — M54.6 ACUTE BILATERAL THORACIC BACK PAIN: ICD-10-CM

## 2023-11-12 PROCEDURE — 72070 X-RAY EXAM THORAC SPINE 2VWS: CPT | Mod: TC | Performed by: RADIOLOGY

## 2023-11-12 PROCEDURE — 99213 OFFICE O/P EST LOW 20 MIN: CPT | Performed by: STUDENT IN AN ORGANIZED HEALTH CARE EDUCATION/TRAINING PROGRAM

## 2023-11-12 RX ORDER — METHYLPREDNISOLONE 4 MG
TABLET, DOSE PACK ORAL
Qty: 21 TABLET | Refills: 0 | Status: SHIPPED | OUTPATIENT
Start: 2023-11-12

## 2023-11-12 RX ORDER — CYCLOBENZAPRINE HCL 10 MG
10 TABLET ORAL 3 TIMES DAILY PRN
Qty: 30 TABLET | Refills: 0 | Status: SHIPPED | OUTPATIENT
Start: 2023-11-12

## 2023-11-12 NOTE — PROGRESS NOTES
Assessment & Plan     Acute bilateral thoracic back pain  Patient presents to  with thoracic back pain in the region of T7-T9 that radiates bilaterally, worse upon waking, improves to mild pain as she starts moving throughout the day. No radiation of pain to abdomen or GI symptoms. Has history of lumbar spinal stenosis and had microdiskectomy L4-L5 2016 and L4-L5 diskectomy 2019. No known injuries or recent strenuous activities though works as a  so repetitive motion of this region may be a factor. I recommended icing, rest, steroid burst with medrol dosepak, cyclobenzaprine as needed to relax muscles advising not to drive after taking this and referral to spine to further evaluate if symptoms are no improving with conservative management.      - XR Thoracic Spine 2 Views  - methylPREDNISolone (MEDROL DOSEPAK) 4 MG tablet therapy pack  Dispense: 21 tablet; Refill: 0  - Spine  Referral  - cyclobenzaprine (FLEXERIL) 10 MG tablet  Dispense: 30 tablet; Refill: 0     No follow-ups on file.    BROWN [facility] URGENT CARE [facility]    Cortez Rosa is a 43 year old female who presents to clinic today for the following health issues:  Chief Complaint   Patient presents with    Urgent Care    Back Pain     Upper back pain started on Wednesday and didn't get any sleep (had 2 surgery in lower back and never have upper back pain)     HPI      History of lumbar spinal stenosis with 2 minor surgeries to release pressure off nerve. Works as a . No new equipment or methods of doing her job that would put strain on the mid back.     Review of Systems  Constitutional, HEENT, cardiovascular, pulmonary, GI, , musculoskeletal, neuro, skin, endocrine and psych systems are negative, except as otherwise noted.      Objective    /80 (BP Location: Left arm, Patient Position: Sitting, Cuff Size: Adult Large)   Pulse 90   Temp 98.6  F (37  C) (Tympanic)    Wt 75.7 kg (166 lb 12.8 oz)   SpO2 98%   BMI 27.33 kg/m    Physical Exam   GENERAL: healthy, alert and no distress  ABDOMEN: soft, nontender, no hepatosplenomegaly, no masses and bowel sounds normal  MS: no tenderness to palpation over thoracic spine or thoracic paraspinal muscles, no appreciable knots in the musculature, normal ROM of thoracic spine  SKIN: no suspicious lesions or rashes  NEURO: Normal strength and tone, mentation intact and speech normal  PSYCH: mentation appears normal, affect normal/bright    Results for orders placed or performed in visit on 11/12/23   XR Thoracic Spine 2 Views     Status: None    Narrative    EXAM: XR THORACIC SPINE 2 VIEWS  LOCATION: [facility]  DATE: 11/12/2023    INDICATION: radicular pain in T8 T11 region  COMPARISON: None.      Impression    IMPRESSION: No fracture. Normal vertebral heights and alignment. Mild midthoracic interbody degeneration. No gross degenerative change of the posterior elements. Normal visualized ribs and lungs.

## 2023-11-16 ENCOUNTER — PATIENT OUTREACH (OUTPATIENT)
Dept: CARE COORDINATION | Facility: CLINIC | Age: 43
End: 2023-11-16
Payer: COMMERCIAL

## 2023-11-30 ENCOUNTER — PATIENT OUTREACH (OUTPATIENT)
Dept: CARE COORDINATION | Facility: CLINIC | Age: 43
End: 2023-11-30
Payer: COMMERCIAL

## 2024-02-10 ENCOUNTER — HEALTH MAINTENANCE LETTER (OUTPATIENT)
Age: 44
End: 2024-02-10

## 2024-10-25 ENCOUNTER — ANCILLARY PROCEDURE (OUTPATIENT)
Dept: MAMMOGRAPHY | Facility: CLINIC | Age: 44
End: 2024-10-25
Attending: FAMILY MEDICINE
Payer: COMMERCIAL

## 2024-10-25 DIAGNOSIS — Z12.31 VISIT FOR SCREENING MAMMOGRAM: ICD-10-CM

## 2024-10-25 PROCEDURE — 77067 SCR MAMMO BI INCL CAD: CPT | Mod: GC | Performed by: RADIOLOGY

## 2024-10-25 PROCEDURE — 77063 BREAST TOMOSYNTHESIS BI: CPT | Mod: GC | Performed by: RADIOLOGY

## 2025-01-24 SDOH — HEALTH STABILITY: PHYSICAL HEALTH: ON AVERAGE, HOW MANY MINUTES DO YOU ENGAGE IN EXERCISE AT THIS LEVEL?: 30 MIN

## 2025-01-24 SDOH — HEALTH STABILITY: PHYSICAL HEALTH: ON AVERAGE, HOW MANY DAYS PER WEEK DO YOU ENGAGE IN MODERATE TO STRENUOUS EXERCISE (LIKE A BRISK WALK)?: 3 DAYS

## 2025-01-24 ASSESSMENT — SOCIAL DETERMINANTS OF HEALTH (SDOH): HOW OFTEN DO YOU GET TOGETHER WITH FRIENDS OR RELATIVES?: ONCE A WEEK

## 2025-01-27 ENCOUNTER — OFFICE VISIT (OUTPATIENT)
Dept: FAMILY MEDICINE | Facility: CLINIC | Age: 45
End: 2025-01-27
Payer: COMMERCIAL

## 2025-01-27 VITALS
DIASTOLIC BLOOD PRESSURE: 82 MMHG | BODY MASS INDEX: 23.46 KG/M2 | SYSTOLIC BLOOD PRESSURE: 122 MMHG | TEMPERATURE: 97.2 F | HEIGHT: 66 IN | WEIGHT: 146 LBS | HEART RATE: 85 BPM | RESPIRATION RATE: 13 BRPM | OXYGEN SATURATION: 100 %

## 2025-01-27 DIAGNOSIS — Z13.1 DIABETES MELLITUS SCREENING: ICD-10-CM

## 2025-01-27 DIAGNOSIS — Z00.00 ROUTINE GENERAL MEDICAL EXAMINATION AT A HEALTH CARE FACILITY: Primary | ICD-10-CM

## 2025-01-27 DIAGNOSIS — Z13.220 SCREENING CHOLESTEROL LEVEL: ICD-10-CM

## 2025-01-27 DIAGNOSIS — N39.0 RECURRENT UTI: ICD-10-CM

## 2025-01-27 DIAGNOSIS — I83.812 VARICOSE VEINS OF LEFT LOWER EXTREMITY WITH PAIN: ICD-10-CM

## 2025-01-27 LAB
CHOLEST SERPL-MCNC: 146 MG/DL
EST. AVERAGE GLUCOSE BLD GHB EST-MCNC: 108 MG/DL
FASTING STATUS PATIENT QL REPORTED: NO
FASTING STATUS PATIENT QL REPORTED: NO
GLUCOSE SERPL-MCNC: 74 MG/DL (ref 70–99)
HBA1C MFR BLD: 5.4 % (ref 0–5.6)
HDLC SERPL-MCNC: 50 MG/DL
LDLC SERPL CALC-MCNC: 73 MG/DL
NONHDLC SERPL-MCNC: 96 MG/DL
TRIGL SERPL-MCNC: 113 MG/DL

## 2025-01-27 PROCEDURE — 83036 HEMOGLOBIN GLYCOSYLATED A1C: CPT | Performed by: FAMILY MEDICINE

## 2025-01-27 PROCEDURE — 80061 LIPID PANEL: CPT | Performed by: FAMILY MEDICINE

## 2025-01-27 PROCEDURE — 82947 ASSAY GLUCOSE BLOOD QUANT: CPT | Performed by: FAMILY MEDICINE

## 2025-01-27 PROCEDURE — 36415 COLL VENOUS BLD VENIPUNCTURE: CPT | Performed by: FAMILY MEDICINE

## 2025-01-27 PROCEDURE — 99396 PREV VISIT EST AGE 40-64: CPT | Performed by: FAMILY MEDICINE

## 2025-01-27 RX ORDER — NITROFURANTOIN 25; 75 MG/1; MG/1
100 CAPSULE ORAL PRN
Qty: 30 CAPSULE | Refills: 3 | Status: SHIPPED | OUTPATIENT
Start: 2025-01-27

## 2025-01-27 ASSESSMENT — PAIN SCALES - GENERAL: PAINLEVEL_OUTOF10: NO PAIN (0)

## 2025-01-27 NOTE — PATIENT INSTRUCTIONS
Patient Education   Preventive Care Advice   This is general advice given by our system to help you stay healthy. However, your care team may have specific advice just for you. Please talk to your care team about your preventive care needs.  Nutrition  Eat 5 or more servings of fruits and vegetables each day.  Try wheat bread, brown rice and whole grain pasta (instead of white bread, rice, and pasta).  Get enough calcium and vitamin D. Check the label on foods and aim for 100% of the RDA (recommended daily allowance).  Lifestyle  Exercise at least 150 minutes each week  (30 minutes a day, 5 days a week).  Do muscle strengthening activities 2 days a week. These help control your weight and prevent disease.  No smoking.  Wear sunscreen to prevent skin cancer.  Have a dental exam and cleaning every 6 months.  Yearly exams  See your health care team every year to talk about:  Any changes in your health.  Any medicines your care team has prescribed.  Preventive care, family planning, and ways to prevent chronic diseases.  Shots (vaccines)   HPV shots (up to age 26), if you've never had them before.  Hepatitis B shots (up to age 59), if you've never had them before.  COVID-19 shot: Get this shot when it's due.  Flu shot: Get a flu shot every year.  Tetanus shot: Get a tetanus shot every 10 years.  Pneumococcal, hepatitis A, and RSV shots: Ask your care team if you need these based on your risk.  Shingles shot (for age 50 and up)  General health tests  Diabetes screening:  Starting at age 35, Get screened for diabetes at least every 3 years.  If you are younger than age 35, ask your care team if you should be screened for diabetes.  Cholesterol test: At age 39, start having a cholesterol test every 5 years, or more often if advised.  Bone density scan (DEXA): At age 50, ask your care team if you should have this scan for osteoporosis (brittle bones).  Hepatitis C: Get tested at least once in your life.  STIs (sexually  transmitted infections)  Before age 24: Ask your care team if you should be screened for STIs.  After age 24: Get screened for STIs if you're at risk. You are at risk for STIs (including HIV) if:  You are sexually active with more than one person.  You don't use condoms every time.  You or a partner was diagnosed with a sexually transmitted infection.  If you are at risk for HIV, ask about PrEP medicine to prevent HIV.  Get tested for HIV at least once in your life, whether you are at risk for HIV or not.  Cancer screening tests  Cervical cancer screening: If you have a cervix, begin getting regular cervical cancer screening tests starting at age 21.  Breast cancer scan (mammogram): If you've ever had breasts, begin having regular mammograms starting at age 40. This is a scan to check for breast cancer.  Colon cancer screening: It is important to start screening for colon cancer at age 45.  Have a colonoscopy test every 10 years (or more often if you're at risk) Or, ask your provider about stool tests like a FIT test every year or Cologuard test every 3 years.  To learn more about your testing options, visit:   .  For help making a decision, visit:   https://bit.ly/bk57622.  Prostate cancer screening test: If you have a prostate, ask your care team if a prostate cancer screening test (PSA) at age 55 is right for you.  Lung cancer screening: If you are a current or former smoker ages 50 to 80, ask your care team if ongoing lung cancer screenings are right for you.  For informational purposes only. Not to replace the advice of your health care provider. Copyright   2023 [employer]. All rights reserved. Clinically reviewed by the [facility]. [employer] 925974 - REV 01/24.

## 2025-01-27 NOTE — LETTER
January 28, 2025      Shelley Day  [address] [place] [address]        Dear ,    We are writing to inform you of your test results.    Your lab work all looked fantastic.  So keep up everything you are doing.     Resulted Orders   Glucose   Result Value Ref Range    Glucose 74 70 - 99 mg/dL    Patient Fasting > 8hrs? No    Hemoglobin A1c   Result Value Ref Range    Estimated Average Glucose 108 <117 mg/dL    Hemoglobin A1C 5.4 0.0 - 5.6 %      Comment:      Normal <5.7%   Prediabetes 5.7-6.4%    Diabetes 6.5% or higher     Note: Adopted from ADA consensus guidelines.   Lipid panel reflex to direct LDL Non-fasting   Result Value Ref Range    Cholesterol 146 <200 mg/dL    Triglycerides 113 <150 mg/dL    Direct Measure HDL 50 >=50 mg/dL    LDL Cholesterol Calculated 73 <100 mg/dL    Non HDL Cholesterol 96 <130 mg/dL    Patient Fasting > 8hrs? No     Narrative    Cholesterol  Desirable: < 200 mg/dL  Borderline High: 200 - 239 mg/dL  High: >= 240 mg/dL    Triglycerides  Normal: < 150 mg/dL  Borderline High: 150 - 199 mg/dL  High: 200-499 mg/dL  Very High: >= 500 mg/dL    Direct Measure HDL  Female: >= 50 mg/dL   Male: >= 40 mg/dL    LDL Cholesterol  Desirable: < 100 mg/dL  Above Desirable: 100 - 129 mg/dL   Borderline High: 130 - 159 mg/dL   High:  160 - 189 mg/dL   Very High: >= 190 mg/dL    Non HDL Cholesterol  Desirable: < 130 mg/dL  Above Desirable: 130 - 159 mg/dL  Borderline High: 160 - 189 mg/dL  High: 190 - 219 mg/dL  Very High: >= 220 mg/dL       If you have any questions or concerns, please call the clinic at the number listed above.       Sincerely,      Tere Hall MD    Electronically signed

## 2025-01-27 NOTE — PROGRESS NOTES
Preventive Care Visit  [facility] SUMA Carranza Anthony Hall MD, Family Medicine  Jan 27, 2025      Assessment & Plan     Routine general medical examination at a health care facility  Routine health maintenance otherwise up-to-date.  I take care of her  and daughter as well.  Currently on a dose of semaglutide through an outside provider as well.  Down over 20 pounds.    Recurrent UTI  Has used for years  - nitroFURantoin macrocrystal-monohydrate (MACROBID) 100 MG capsule; Take 1 capsule (100 mg) by mouth as needed (UTI prevention).    Varicose veins of left lower extremity with pain  Veins and symptoms are returning.  - Vascular Surgery Referral; Future    Screening cholesterol level  Previous procedure on left lower extremity but now  - Lipid panel reflex to direct LDL Non-fasting; Future  - Lipid panel reflex to direct LDL Non-fasting    Diabetes mellitus screening    - Glucose; Future  - Hemoglobin A1c; Future  - Glucose  - Hemoglobin A1c    Patient has been advised of split billing requirements and indicates understanding: Yes        Counseling  Appropriate preventive services were addressed with this patient via screening, questionnaire, or discussion as appropriate for fall prevention, nutrition, physical activity, Tobacco-use cessation, social engagement, weight loss and cognition.  Checklist reviewing preventive services available has been given to the patient.  Reviewed patient's diet, addressing concerns and/or questions.   She is at risk for lack of exercise and has been provided with information to increase physical activity for the benefit of her well-being.       Regular exercise  See Patient Instructions    Cortez Rosa is a 44 year old, presenting for the following:  Physical (left leg varicose veins (FLARE UP, WOULD LIKE TO CHECK))        1/27/2025     2:19 PM   Additional Questions   Roomed by Sai   Accompanied by SELF         1/27/2025     2:19 PM   Patient  Reported Additional Medications   Patient reports taking the following new medications NO          HPI  Here today for routine checkup and to establish care.  I take care of her  and daughter.  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    Concern - left leg varicose veins   Onset: 6 MONTHS   Description: FEELS LIKE A LIZZETH HORSE, GET WAKEN UP 6 TIMES PER NIGHT  Intensity: mild  Progression of Symptoms:  worsening  Accompanying Signs & Symptoms: FEELS LIKE A LIZZETH HORSE, GET WAKEN UP 6 TIMES PER NIGHT  Previous history of similar problem: yES  Precipitating factors:        Worsened by: NIGHT  Alleviating factors:        Improved by: DAYS DON'T WORK  Therapies tried and outcome: NONE    Health Care Directive  Patient does not have a Health Care Directive: Discussed advance care planning with patient; however, patient declined at this time.      1/24/2025   General Health   How would you rate your overall physical health? Good   Feel stress (tense, anxious, or unable to sleep) Only a little   (!) STRESS CONCERN      1/24/2025   Nutrition   Three or more servings of calcium each day? Yes   Diet: Other   If other, please elaborate: No processed foods and I watch sugar intake.   How many servings of fruit and vegetables per day? (!) 2-3   How many sweetened beverages each day? 0-1         1/24/2025   Exercise   Days per week of moderate/strenous exercise 3 days   Average minutes spent exercising at this level 30 min         1/24/2025   Social Factors   Frequency of gathering with friends or relatives Once a week   Worry food won't last until get money to buy more No   Food not last or not have enough money for food? No   Do you have housing? (Housing is defined as stable permanent housing and does not include staying ouside in a car, in a tent, in an abandoned building, in an overnight shelter, or couch-surfing.) Yes   Are you worried about losing your housing? No   Lack of transportation? No   Unable to get  utilities (heat,electricity)? No         2025   Dental   Dentist two times every year? Yes         2025   TB Screening   Were you born outside of the [place]? No         Today's PHQ-2 Score:       2025     2:18 PM   PHQ-2 (  [employer])   Q1: Little interest or pleasure in doing things 0   Q2: Feeling down, depressed or hopeless 0   PHQ-2 Score 0           2025   Substance Use   Alcohol more than 3/day or more than 7/wk No   Do you use any other substances recreationally? No     Social History     Tobacco Use    Smoking status: Former     Current packs/day: 0.00     Types: Cigarettes     Quit date: 2006     Years since quittin.0     Passive exposure: Past    Smokeless tobacco: Never   Vaping Use    Vaping status: Never Used   Substance Use Topics    Alcohol use: Yes     Comment: 3-4 drinks per week    Drug use: No           10/25/2024   LAST FHS-7 RESULTS   1st degree relative breast or ovarian cancer No   Any relative bilateral breast cancer No   Any male have breast cancer No   Any ONE woman have BOTH breast AND ovarian cancer No   Any woman with breast cancer before 50yrs No   2 or more relatives with breast AND/OR ovarian cancer No   2 or more relatives with breast AND/OR bowel cancer No        Mammogram Screening - Mammogram every 1-2 years updated in Health Maintenance based on mutual decision making          2025   One time HIV Screening   Previous HIV test? No         2025   STI Screening   New sexual partner(s) since last STI/HIV test? No     History of abnormal Pap smear: nO        Latest Ref Rng & Units 2022    10:03 AM 2018    11:48 AM 2012     8:05 AM   PAP / HPV   PAP  Negative for Intraepithelial Lesion or Malignancy (NILM)      PAP (Historical)   NIL  NIL    HPV 16 DNA Negative Negative  Negative     HPV 18 DNA Negative Negative  Negative     Other HR HPV Negative Negative  Negative       ASCVD Risk   The 10-year ASCVD risk score (Etta COLÓN,  "et al., 2019) is: 0.3%    Values used to calculate the score:      Age: 44 years      Sex: Female      Is Non- : No      Diabetic: No      Tobacco smoker: No      Systolic Blood Pressure: 122 mmHg      Is BP treated: No      HDL Cholesterol: 75 mg/dL      Total Cholesterol: 172 mg/dL        1/24/2025   Contraception/Family Planning   Questions about contraception or family planning No        Reviewed and updated as needed this visit by Provider   Tobacco  Allergies  Meds  Problems  Med Hx  Surg Hx  Fam Hx            Lab work is in process  Labs reviewed in EPIC  BP Readings from Last 3 Encounters:   01/27/25 122/82   11/12/23 115/80   04/26/23 117/74    Wt Readings from Last 3 Encounters:   01/27/25 66.2 kg (146 lb)   11/12/23 75.7 kg (166 lb 12.8 oz)   04/26/23 77.3 kg (170 lb 8 oz)                      Review of Systems  CONSTITUTIONAL: NEGATIVE for fever, chills, change in weight  INTEGUMENTARY/SKIN: NEGATIVE for worrisome rashes, moles or lesions  EYES: NEGATIVE for vision changes or irritation  ENT/MOUTH: NEGATIVE for ear, mouth and throat problems  RESP: NEGATIVE for significant cough or SOB  BREAST: NEGATIVE for masses, tenderness or discharge  CV: NEGATIVE for chest pain, palpitations or peripheral edema  GI: NEGATIVE for nausea, abdominal pain, heartburn, or change in bowel habits  : NEGATIVE for frequency, dysuria, or hematuria  MUSCULOSKELETAL: NEGATIVE for significant arthralgias or myalgia  NEURO: NEGATIVE for weakness, dizziness or paresthesias  ENDOCRINE: NEGATIVE for temperature intolerance, skin/hair changes  HEME: NEGATIVE for bleeding problems  PSYCHIATRIC: NEGATIVE for changes in mood or affect     Objective    Exam  /82 (BP Location: Right arm, Patient Position: Sitting, Cuff Size: Adult Regular)   Pulse 85   Temp 97.2  F (36.2  C) (Temporal)   Resp 13   Ht 1.671 m (5' 5.8\")   Wt 66.2 kg (146 lb)   SpO2 100%   BMI 23.71 kg/m     Estimated body mass " "index is 23.71 kg/m  as calculated from the following:    Height as of this encounter: 1.671 m (5' 5.8\").    Weight as of this encounter: 66.2 kg (146 lb).    Physical Exam  GENERAL: alert and no distress  EYES: Eyes grossly normal to inspection, PERRL and conjunctivae and sclerae normal  HENT: ear canals and TM's normal, nose and mouth without ulcers or lesions  NECK: no adenopathy, no asymmetry, masses, or scars  RESP: lungs clear to auscultation - no rales, rhonchi or wheezes  CV: regular rate and rhythm, normal S1 S2, no S3 or S4, no murmur, click or rub, no peripheral edema  ABDOMEN: soft, nontender, no hepatosplenomegaly, no masses and bowel sounds normal  MS: no gross musculoskeletal defects noted, no edema  SKIN: no suspicious lesions or rashes  NEURO: Normal strength and tone, mentation intact and speech normal  PSYCH: mentation appears normal, affect normal/bright        Signed Electronically by: Tere Hall MD    "

## 2025-01-28 ENCOUNTER — TELEPHONE (OUTPATIENT)
Dept: VASCULAR SURGERY | Facility: CLINIC | Age: 45
End: 2025-01-28
Payer: COMMERCIAL

## 2025-01-28 NOTE — RESULT ENCOUNTER NOTE
Shelley,  Your lab work all looked fantastic.  So keep up everything you are doing.  AARON Hall M.D.

## 2025-01-30 NOTE — TELEPHONE ENCOUNTER
LM asking patient to call to schedule and stated this is our 2nd and final attempt to reach out but that patient should feel free to call our clinic at any point in the future to schedule.     Xiomara ELIZALDE [facility] Vein [facility]

## 2025-02-17 ENCOUNTER — OFFICE VISIT (OUTPATIENT)
Dept: VASCULAR SURGERY | Facility: CLINIC | Age: 45
End: 2025-02-17
Payer: COMMERCIAL

## 2025-02-17 DIAGNOSIS — I83.812 VARICOSE VEINS OF LEFT LOWER EXTREMITY WITH PAIN: Primary | ICD-10-CM

## 2025-02-17 DIAGNOSIS — Z98.890 STATUS POST ENDOVENOUS RADIOFREQUENCY ABLATION (RFA) OF SAPHENOUS VEIN: ICD-10-CM

## 2025-02-17 PROCEDURE — 99203 OFFICE O/P NEW LOW 30 MIN: CPT | Performed by: SURGERY

## 2025-02-17 ASSESSMENT — ENCOUNTER SYMPTOMS
WEIGHT LOSS: 1
MUSCLE CRAMPS: 1

## 2025-02-17 NOTE — LETTER
2/17/2025      Jaylin Day  [address] [place] [facility] [address]      Dear Colleague,    Thank you for referring your patient, Jaylin Day, to the [facility] [place]. Please see a copy of my visit note below.    VEIN SOLUTIONS CONSULT    Impression:  1.  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    2.  History of radiofrequency ablation of the left greater saphenous vein with multiple left leg stab phlebectomies performed at an outside institution in 2014.    Plan:  I had a nice discussion with Shelley reviewing basic venous pathophysiology.  She has significant recurrent left leg varicosities.  She wears a knee-high compression stocking on a daily basis.  Despite this she has significant pain and discomfort.  She wakes up frequently with severe charley horse pain.  I consider her to be a failure of conservative therapy.  I will arrange for a left leg venous competency study.  We will meet again either in person or virtually to discuss those results and my treatment recommendations.    Total length of this encounter was 15 minutes with time spent reviewing records, interviewing and examining the patient, answering questions, and coordinating a treatment plan.        HPI:   Jaylin Day is a healthy 44-year-old female who developed significant left leg varicosities in 2003 after her second pregnancy.  Ultimately she underwent RFA of her left greater saphenous vein with multiple left leg stab phlebectomies by an outside surgeon in 2014.  She has a familial history of venous disease on her dad side.  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  She utilizes knee-high compression stockings on a daily basis.  Despite this, her left leg pain has been worsening and frequently keeps her up at night.  Her past medical history is otherwise noncontributory.      CURRENT MEDICATIONS  Current Outpatient Medications   Medication  Sig Dispense Refill     nitroFURantoin macrocrystal-monohydrate (MACROBID) 100 MG capsule Take 1 capsule (100 mg) by mouth as needed (UTI prevention). 30 capsule 3     SEMAGLUTIDE-WEIGHT MANAGEMENT SC Inject subcutaneously.       No current facility-administered medications for this visit.         PAST MEDICAL HISTORY  Past Medical History:   Diagnosis Date     Female pelvic pain 2008    possible mild bicornuate or septate uterus     Migraines 2004    resolved      Preeclampsia      Recurrent UTI          PAST SURGICAL HISTORY:  Past Surgical History:   Procedure Laterality Date     BACK SURGERY       DISCECTOMY LUMBAR MINIMALLY INVASIVE ONE LEVEL Left 2019    Procedure: Redo Minimally Invasive Left Lumbar 4/5 Microdiscectomy;  Surgeon: Cecile Greenberg MD;  Location: [facility] OR     IR LUMBAR PUNCTURE  2016     IR LUMBAR PUNCTURE  2016     left L4-5 hemilaminectomy, microdiskectomy         ALLERGIES     Allergies   Allergen Reactions     Methocarbamol Hives       FAMILY HISTORY  Family History   Problem Relation Age of Onset     Hypertension Maternal Grandmother      Thyroid Disease Son         hypothyroidism, congenital     Hypertension Maternal Uncle      C.A.D. No family hx of      Diabetes No family hx of        SOCIAL HISTORY  Social History     Tobacco Use     Smoking status: Former     Current packs/day: 0.00     Types: Cigarettes     Quit date: 2006     Years since quittin.1     Passive exposure: Past     Smokeless tobacco: Never   Vaping Use     Vaping status: Never Used   Substance Use Topics     Alcohol use: Yes     Comment: 3-4 drinks per week     Drug use: No       ROS:   Review of Systems   Constitutional: Positive for weight loss.   Cardiovascular:  Positive for leg swelling.   Skin:  Positive for itching.   Musculoskeletal:  Positive for joint pain and muscle cramps.   All other systems reviewed and are negative.        EXAM:  There were no vitals taken for this  visit.  Physical Exam  Constitutional:       Appearance: Normal appearance.   HENT:      Head: Normocephalic and atraumatic.   Eyes:      General: No scleral icterus.     Extraocular Movements: Extraocular movements intact.      Pupils: Pupils are equal, round, and reactive to light.   Cardiovascular:      Pulses:           Dorsalis pedis pulses are 2+ on the right side and 2+ on the left side.        Posterior tibial pulses are 2+ on the right side and 2+ on the left side.      Comments: Significant ropey varicosities beginning on the anteromedial aspect of the left thigh and coursing medially down to the thigh and onto the anteromedial left calf.  Some hyperpigmentation overlying the veins medial to the knee.    Musculoskeletal:         General: Normal range of motion.      Cervical back: Normal range of motion.      Left lower leg: Edema present.   Skin:     General: Skin is warm and dry.   Neurological:      General: No focal deficit present.      Mental Status: She is alert and oriented to person, place, and time. Mental status is at baseline.   Psychiatric:         Mood and Affect: Mood normal.         Behavior: Behavior normal.         Thought Content: Thought content normal.         Judgment: Judgment normal.       Labs:  LIPID RESULTS:  Lab Results   Component Value Date    CHOL 146 01/27/2025    CHOL 189 02/21/2018    HDL 50 01/27/2025    HDL 69 02/21/2018    LDL 73 01/27/2025     (H) 02/21/2018    TRIG 113 01/27/2025    TRIG 83 02/21/2018    CHOLHDLRATIO 2.7 04/27/2012       CBC RESULTS:  Lab Results   Component Value Date    WBC 7.8 04/02/2019    RBC 4.07 04/02/2019    HGB 12.9 04/02/2019    HCT 40.1 04/02/2019    MCV 99 04/02/2019    MCH 31.7 04/02/2019    MCHC 32.2 04/02/2019    RDW 12.9 04/02/2019     04/02/2019       BMP RESULTS:  Lab Results   Component Value Date     02/21/2018    POTASSIUM 4.2 02/21/2018    CHLORIDE 105 02/21/2018    CO2 27 02/21/2018    ANIONGAP 6 02/21/2018     GLC 74 01/27/2025    GLC 94 02/21/2018    BUN 14 02/21/2018    CR 0.61 02/21/2018    GFRESTIMATED >90 02/21/2018    GFRESTBLACK >90 02/21/2018    NARCISA 8.5 02/21/2018        A1C RESULTS:  Lab Results   Component Value Date    A1C 5.4 01/27/2025         Imaging:  No results found for this or any previous visit (from the past 24 hours).              Km Mckay MD            VEIN CLINIC LEG DRAWING:              Again, thank you for allowing me to participate in the care of your patient.        Sincerely,        Km Mckay MD    Electronically signed

## 2025-02-17 NOTE — PROGRESS NOTES
VEIN SOLUTIONS CONSULT    Impression:  1.  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    2.  History of radiofrequency ablation of the left greater saphenous vein with multiple left leg stab phlebectomies performed at an outside institution in 2014.    Plan:  I had a nice discussion with Shelley reviewing basic venous pathophysiology.  She has significant recurrent left leg varicosities.  She wears a knee-high compression stocking on a daily basis.  Despite this she has significant pain and discomfort.  She wakes up frequently with severe charley horse pain.  I consider her to be a failure of conservative therapy.  I will arrange for a left leg venous competency study.  We will meet again either in person or virtually to discuss those results and my treatment recommendations.    Total length of this encounter was 15 minutes with time spent reviewing records, interviewing and examining the patient, answering questions, and coordinating a treatment plan.        HPI:   Jaylin Day is a healthy 44-year-old female who developed significant left leg varicosities in 2003 after her second pregnancy.  Ultimately she underwent RFA of her left greater saphenous vein with multiple left leg stab phlebectomies by an outside surgeon in 2014.  She has a familial history of venous disease on her dad side.  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  She utilizes knee-high compression stockings on a daily basis.  Despite this, her left leg pain has been worsening and frequently keeps her up at night.  Her past medical history is otherwise noncontributory.      CURRENT MEDICATIONS  Current Outpatient Medications   Medication Sig Dispense Refill    nitroFURantoin macrocrystal-monohydrate (MACROBID) 100 MG capsule Take 1 capsule (100 mg) by mouth as needed (UTI prevention). 30 capsule 3    SEMAGLUTIDE-WEIGHT MANAGEMENT SC Inject subcutaneously.       No current  facility-administered medications for this visit.         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    Migraines 2004    resolved     Preeclampsia     Recurrent UTI          PAST SURGICAL HISTORY:  Past Surgical History:   Procedure Laterality Date    BACK SURGERY      DISCECTOMY LUMBAR MINIMALLY INVASIVE ONE LEVEL Left 2019    Procedure: Redo Minimally Invasive Left Lumbar 4/5 Microdiscectomy;  Surgeon: Cecile Greenberg MD;  Location: [facility] OR    IR LUMBAR PUNCTURE  2016    IR LUMBAR PUNCTURE  2016    left L4-5 hemilaminectomy, microdiskectomy  2016       ALLERGIES     Allergies   Allergen Reactions    Methocarbamol Hives       FAMILY HISTORY  Family History   Problem Relation Age of Onset    Hypertension Maternal Grandmother     Thyroid Disease Son         hypothyroidism, congenital    Hypertension Maternal Uncle     C.A.D. No family hx of     Diabetes No family hx of        SOCIAL HISTORY  Social History     Tobacco Use    Smoking status: Former     Current packs/day: 0.00     Types: Cigarettes     Quit date: 2006     Years since quittin.1     Passive exposure: Past    Smokeless tobacco: Never   Vaping Use    Vaping status: Never Used   Substance Use Topics    Alcohol use: Yes     Comment: 3-4 drinks per week    Drug use: No       ROS:   Review of Systems   Constitutional: Positive for weight loss.   Cardiovascular:  Positive for leg swelling.   Skin:  Positive for itching.   Musculoskeletal:  Positive for joint pain and muscle cramps.   All other systems reviewed and are negative.        EXAM:  There were no vitals taken for this visit.  Physical Exam  Constitutional:       Appearance: Normal appearance.   HENT:      Head: Normocephalic and atraumatic.   Eyes:      General: No scleral icterus.     Extraocular Movements: Extraocular movements intact.      Pupils: Pupils are equal, round, and reactive  to light.   Cardiovascular:      Pulses:           Dorsalis pedis pulses are 2+ on the right side and 2+ on the left side.        Posterior tibial pulses are 2+ on the right side and 2+ on the left side.      Comments: Significant ropey varicosities beginning on the anteromedial aspect of the left thigh and coursing medially down to the thigh and onto the anteromedial left calf.  Some hyperpigmentation overlying the veins medial to the knee.    Musculoskeletal:         General: Normal range of motion.      Cervical back: Normal range of motion.      Left lower leg: Edema present.   Skin:     General: Skin is warm and dry.   Neurological:      General: No focal deficit present.      Mental Status: She is alert and oriented to person, place, and time. Mental status is at baseline.   Psychiatric:         Mood and Affect: Mood normal.         Behavior: Behavior normal.         Thought Content: Thought content normal.         Judgment: Judgment normal.       Labs:  LIPID RESULTS:  Lab Results   Component Value Date    CHOL 146 01/27/2025    CHOL 189 02/21/2018    HDL 50 01/27/2025    HDL 69 02/21/2018    LDL 73 01/27/2025     (H) 02/21/2018    TRIG 113 01/27/2025    TRIG 83 02/21/2018    CHOLHDLRATIO 2.7 04/27/2012       CBC RESULTS:  Lab Results   Component Value Date    WBC 7.8 04/02/2019    RBC 4.07 04/02/2019    HGB 12.9 04/02/2019    HCT 40.1 04/02/2019    MCV 99 04/02/2019    MCH 31.7 04/02/2019    MCHC 32.2 04/02/2019    RDW 12.9 04/02/2019     04/02/2019       BMP RESULTS:  Lab Results   Component Value Date     02/21/2018    POTASSIUM 4.2 02/21/2018    CHLORIDE 105 02/21/2018    CO2 27 02/21/2018    ANIONGAP 6 02/21/2018    GLC 74 01/27/2025    GLC 94 02/21/2018    BUN 14 02/21/2018    CR 0.61 02/21/2018    GFRESTIMATED >90 02/21/2018    GFRESTBLACK >90 02/21/2018    NARCISA 8.5 02/21/2018        A1C RESULTS:  Lab Results   Component Value Date    A1C 5.4 01/27/2025         Imaging:  No results  found for this or any previous visit (from the past 24 hours).              Km Mckay MD

## 2025-02-20 ENCOUNTER — ANCILLARY PROCEDURE (OUTPATIENT)
Dept: ULTRASOUND IMAGING | Facility: CLINIC | Age: 45
End: 2025-02-20
Attending: SURGERY
Payer: COMMERCIAL

## 2025-02-20 ENCOUNTER — OFFICE VISIT (OUTPATIENT)
Dept: VASCULAR SURGERY | Facility: CLINIC | Age: 45
End: 2025-02-20
Attending: SURGERY
Payer: COMMERCIAL

## 2025-02-20 DIAGNOSIS — Z98.890 STATUS POST ENDOVENOUS RADIOFREQUENCY ABLATION (RFA) OF SAPHENOUS VEIN: ICD-10-CM

## 2025-02-20 DIAGNOSIS — I83.812 VARICOSE VEINS OF LEFT LOWER EXTREMITY WITH PAIN: Primary | ICD-10-CM

## 2025-02-20 DIAGNOSIS — I83.812 VARICOSE VEINS OF LEFT LOWER EXTREMITY WITH PAIN: ICD-10-CM

## 2025-02-20 NOTE — PROGRESS NOTES
Jaylin Day is a healthy 44-year-old female just evaluated by me on 2/17/2025 for symptomatic, recurrent left leg varicosities.  She has a history of radiofrequency ablation of her left greater saphenous vein with multiple left leg stab phlebectomies performed at an outside institution in 2014.  She wears knee-high compression stockings on a daily basis for several years and still complains of pain, aching, and heaviness.  Sometimes her nocturnal pain awakens her from sleep.  Her symptoms negatively impact her employment.  I consider her to be a failure of conservative therapy.  She presents today for a left leg venous competency study.  She is accompanied today by her .  There has been no other change in her health history.    I did not repeat a physical examination today.  She has large ropey varicosities coursing down the anteromedial aspect of her left thigh and down the medial aspect of her left calf.  She has some hyperpigmentation overlying the veins medial to her left knee.  She has easily palpable dorsalis pedis and posterior tibial pulses bilaterally.    Imaging:  Left leg venous competency study today shows unremarkable deep veins.  She has an incompetent saphenofemoral junction and an incompetent left anterior accessory saphenous vein which takes a straight course for 8 cm before breaking through fascia and giving rise to a 5.1 mm varicosity coursing medially down the thigh.  The left AASV is 6.3 mm in diameter at the junction and 4.5 mm in diameter in the proximal thigh.  The left greater saphenous vein is not visualized in the proximal and mid thigh.  The left greater saphenous vein is incompetent from the distal thigh through the knee measuring 4.7 mm in diameter in the distal thigh and 4.8 mm in diameter at the knee.  It has a reflux time of 13.4 seconds and it gives rise to a 6.7 mm varicosity at the knee coursing medially down the calf.  No other clinically significant left leg  superficial incompetence.    ASSESSMENT:  Symptomatic, recurrent varicosities secondary to incompetence of the left anterior accessory saphenous vein and incompetence of the left greater saphenous vein from the distal thigh through the knee.  Each of these incompetent veins gives off large incompetent tributaries all contributing to her symptomatology.    PLAN:  I thoroughly reviewed all the above with Shelley and her .  The recommended surgical treatment would be radiofrequency ablation of the 8 cm left anterior accessory saphenous vein.  If access was not possible I would perform limited open ligation and division of that structure.  I would also attempt radiofrequency ablation of the short segment of incompetent left greater saphenous vein in the distal thigh and at the knee.  This could represent a recanalized segment of the previously closed vein.  There is the possibility that there could be scarring with webs or synechiae making delivery of the catheter not possible.  I would not know these things until attempting to close these 2 structures.  In either case I would also recommend medically necessary (large, recurrent) stab phlebectomies of prominent left leg varicosities.  If I was not able to close the GSV remnant in the distal thigh I would approach that at the 6-week postop jesu with some limited medically necessary, ultrasound-guided sclerotherapy of the GSV remnant.    I thoroughly describe the specifics of the above-noted procedures including potential complications, inability to perform the procedures due to the technical reasons noted, and the anticipated postprocedural course.  All of their questions were answered and they verbalized full understanding to the above and a desire to proceed.    Total length of this encounter was 30 minutes with time spent reviewing studies, interviewing and examining the patient, answering questions, and coordinating a treatment plan.    Rolo Mckay MD

## 2025-02-20 NOTE — PROGRESS NOTES
February 20, 2025    Vein Procedure Recommendation    Spoke with patient in clinic.    Dr. Mckay has recommended patient to have the following vein procedure(s):     1. Left leg VNUS closure AASV, GSV remnant and 2 units  Phlebectomies (medically necessary). May need to do an open ligation and division of AASV due to short length.    2. Left leg Ultrasound Guided Sclerotherapy (medically necessary) at 6 weeks of the GSV remnant IF unable to close      Patient Pre-op Questions:  [facility]  Anticoagulant/ASA: Yes, Aspirin 81 mg - informed of 3 day hold prior to phlebs, no hold sclero  Artificial Joint or Heart Valve:  No  Open ulcer:  No  Sedation nausea: No      Patient is recommended to wear Thigh High compression hose following her procedure. Discussed compression hose. Explained to patient if insurance doesn't cover the compression hose there are a couple different options to getting them and to call the clinic to let us know if they need help.    Handed patient written procedure instructions to review on her own (see After Visit Summary).    Next steps:    Insurance Submission  Informed patient this process could take up to 14 business days, but once approved, the patient will be contacted by our surgery scheduler to schedule the above procedure. Gave patient our surgery scheduler's information.    Informed patient to call our office regarding the status of their insurance authorization if it has been more than 3 weeks and have not heard from our surgery scheduler.    Patient is in agreement with all of the above and has no further questions at this time.    Mary Monet RN  [facility]  Vein Clinic

## 2025-02-20 NOTE — LETTER
2/20/2025      Jaylin Day  [address] [place] [facility] [id number]      Dear Colleague,    Thank you for referring your patient, Jaylin Day, to the [facility] [place]. Please see a copy of my visit note below.    Jaylin Day is a healthy 44-year-old female just evaluated by me on 2/17/2025 for symptomatic, recurrent left leg varicosities.  She has a history of radiofrequency ablation of her left greater saphenous vein with multiple left leg stab phlebectomies performed at an outside institution in 2014.  She wears knee-high compression stockings on a daily basis for several years and still complains of pain, aching, and heaviness.  Sometimes her nocturnal pain awakens her from sleep.  Her symptoms negatively impact her employment.  I consider her to be a failure of conservative therapy.  She presents today for a left leg venous competency study.  She is accompanied today by her .  There has been no other change in her health history.    I did not repeat a physical examination today.  She has large ropey varicosities coursing down the anteromedial aspect of her left thigh and down the medial aspect of her left calf.  She has some hyperpigmentation overlying the veins medial to her left knee.  She has easily palpable dorsalis pedis and posterior tibial pulses bilaterally.    Imaging:  Left leg venous competency study today shows unremarkable deep veins.  She has an incompetent saphenofemoral junction and an incompetent left anterior accessory saphenous vein which takes a straight course for 8 cm before breaking through fascia and giving rise to a 5.1 mm varicosity coursing medially down the thigh.  The left AASV is 6.3 mm in diameter at the junction and 4.5 mm in diameter in the proximal thigh.  The left greater saphenous vein is not visualized in the proximal and mid thigh.  The left greater saphenous vein is incompetent from the distal thigh through the knee  measuring 4.7 mm in diameter in the distal thigh and 4.8 mm in diameter at the knee.  It has a reflux time of 13.4 seconds and it gives rise to a 6.7 mm varicosity at the knee coursing medially down the calf.  No other clinically significant left leg superficial incompetence.    ASSESSMENT:  Symptomatic, recurrent varicosities secondary to incompetence of the left anterior accessory saphenous vein and incompetence of the left greater saphenous vein from the distal thigh through the knee.  Each of these incompetent veins gives off large incompetent tributaries all contributing to her symptomatology.    PLAN:  I thoroughly reviewed all the above with Shelley and her .  The recommended surgical treatment would be radiofrequency ablation of the 8 cm left anterior accessory saphenous vein.  If access was not possible I would perform limited open ligation and division of that structure.  I would also attempt radiofrequency ablation of the short segment of incompetent left greater saphenous vein in the distal thigh and at the knee.  This could represent a recanalized segment of the previously closed vein.  There is the possibility that there could be scarring with webs or synechiae making delivery of the catheter not possible.  I would not know these things until attempting to close these 2 structures.  In either case I would also recommend medically necessary (large, recurrent) stab phlebectomies of prominent left leg varicosities.  If I was not able to close the GSV remnant in the distal thigh I would approach that at the 6-week postop jesu with some limited medically necessary, ultrasound-guided sclerotherapy of the GSV remnant.    I thoroughly describe the specifics of the above-noted procedures including potential complications, inability to perform the procedures due to the technical reasons noted, and the anticipated postprocedural course.  All of their questions were answered and they verbalized full  understanding to the above and a desire to proceed.    Total length of this encounter was 30 minutes with time spent reviewing studies, interviewing and examining the patient, answering questions, and coordinating a treatment plan.    Rolo Mckay MD      Again, thank you for allowing me to participate in the care of your patient.        Sincerely,        Km Mckay MD    Electronically signed

## 2025-02-20 NOTE — PATIENT INSTRUCTIONS
Mary Wade, Surgery Scheduler - [phone].    Procedure Plan:   1. Left leg VNUS closure AASV, GSV remnant and 2 units  Phlebectomies (medically necessary). May need to do an open ligation and division of AASV due to short length.    2. Left leg Ultrasound Guided Sclerotherapy (medically necessary) at 6 weeks of the GSV remnant IF unable to close    Please call our office regarding the status of your insurance authorization if it has been more than 3 weeks and you have not heard from our surgery scheduler.        Pre-Procedure Instructions:                           VNUS Closure and Phlebectomies   You are having a Closure(s) - where one or more veins are closed and Phlebectomies - where one or more veins are removed.   Insurance  Precertification and/or referral authorization may be required by your insurance company.  We will call your insurance company to verify benefits for the medically necessary part of your procedure.    Your Current Medications and Allergies  To reduce bruising, please do not take aspirin-type medications (Motrin, Aleve, Ibuprofen, Advil, etc.) for three days before your procedure. You may take Tylenol if you need a pain reliever.  Are you on blood thinner medications? (Plavix, Coumadin, Eliquis, Xarelto) Please discuss this with your surgeon. You may resume taking your blood thinner medication after your procedure.  Are you sensitive to latex or adhesives used for fake fingernails? Please let us know!    Driving Escort and   Please arrange to have a trusted adult (18 years old or older) drive you to and from the clinic.  For your safety, we recommend you have a trusted adult to stay with you until the next morning.    Your Health  If you have a change in your health before the procedure, contact our office immediately. (For example: cold symptoms, cough, urinary tract infection, fever, flu symptoms.)  A pre-procedure physical is not required.    Note  It is sometimes  necessary to adjust the procedure schedule due to emergencies. We greatly appreciate your flexibility and understanding in this matter.                  Check List: The Morning of Your Procedure  ___1. Please do not put anything on your leg(s) or shave the day of your procedure.  ___2. You may take your normal medications the day of your procedure.  ___3. It is recommended you eat a light breakfast or lunch the day of your procedure.  ___4. Wear comfortable loose-fitting clothing and wide-fitting shoes (i.e. tennis shoes, slip-ons).  ___5. Please arrive at our clinic at the specified time given by the nurse.  ___6. You will sign an affirmation of informed consent.  ___7. Bring your pre-procedure sedation medication (lorazepam and clonidine) with you to the clinic.              One hour before your procedure, you will be instructed to take these medications. The lorazepam              (Ativan) lowers anxiety and sedates you; the clonidine makes the lorazepam more effective. Everyone's              body processes these medications differently. Therefore, reactions to these medications vary. Some              people stay awake and some people sleep through the whole procedure. You may not remember              everything about the procedure or the day. You do not want to make any big decisions for the rest of the              day.    The Day of Your Procedure:       VNUS Closure and Phlebectomies  In the Exam Room  A nurse will bring you back to an exam room with your family member or friend. This is when your informed consent will be signed, and you will take your pre-procedure medications.  You will be asked to remove everything from the waist down, including undergarments. You will then put on a hospital gown or shorts and blue booties.  Your surgeon will come in to answer any questions and jesu any bulging varicose veins to be removed.  You will be taken to the restroom to empty your bladder before going into the  procedure room.    In the Procedure Room  You will be escorted to the procedure room. You will lie on a procedure table covered with a sheet or blanket.  A nurse will put a blood pressure cuff on your arm and a pulse/oxygen monitor on a finger. Your vital signs will be monitored every 15 minutes.  Your gown will be pulled up slightly and the groin exposed for a short period of time. The surgeon's assistant will clean your foot, leg, and groin with an antibacterial solution. We will get you covered up as quickly as possible!  Sterile towels and blue drapes will be used to cover you and the table. You will be asked to keep your hands under the blue drapes during the procedure.  The lights will be turned down. The table will be tipped so your head is higher than your feet. You may feel like you're going to slide off, but you won't.    The Procedure  The surgeon will visualize your veins with an ultrasound machine. He or she will then numb your skin and access the vein. A catheter is passed up the vein and positioned with ultrasound guidance. The table will then be tipped head down.  Once the catheter is in the correct position, medication will be injected to numb your leg. You will feel some needle sticks and may feel discomfort as the medication goes in. Once this is done, you should not experience significant discomfort. But if you do, please let us know and more numbing medication can be injected. As the catheter sends out heat, the vein closes off and the catheter is withdrawn.  For the phlebectomy part of the procedure, small incisions are made where the bulging varicose veins have been marked on your leg(s) and these veins will be removed using a small ladonna hook instrument.    Post-Procedure  Once the procedure is done, your leg(s) will be washed with warm water and dried. Your leg(s) will be bandaged with large soft dressings and a large ACE bandage wrapped from toes to groin.   You will be offered something  to drink and a light snack.  You will rest with your leg(s) elevated for approximately 30 minutes. Your friend or family member may join you.  For your safety, you will be taken to your car in a wheelchair. If you are able to, it is good to keep your leg(s) elevated on the car ride home.    Post-Procedure Instructions:             VNUS Closure and Phlebectomies      Post-Op Day Zero - The Day of Your Procedure:0  1. Medication for Pain Control and Inflammation Control   - The numbing medication injected during your procedure will last for several hours. The pre-procedure                 tablets may make you very sleepy and you might not remember everything from the procedure or from                 the day. This will usually wear off by the next day.   - Ibuprofen:  If tolerated, take ibuprofen (e.g., Advil) to reduce inflammation whether or not you have                 pain. For three days, take two tablets (200 mg each) with every meal and at bedtime with a snack. If                 your pain is not controlled with ibuprofen, you may take prescription pain medication (such as Norco),                 if prescribed.   - You may resume taking any medications you were taking before your procedure.  2. Activity   - Rest with your leg(s) elevated above your heart. This will prevent from a lot of swelling and                 bleeding. You do not need to elevate your leg(s) while sleeping at night. You may go upstairs, sit up to                 eat, use the bathroom, and take several five-minute walks. Otherwise, keep your leg(s) elevated.                 Minimize the amount of time you are up on your feet to about 30 minutes at a time.  3. Bandages   - The incision sites will be covered with soft bandages and an ACE wrap. Keep your bandages on                 and dry until your post procedure nurse visit. The ACE should provide  snug  compression but should not cause pain or                 numbness in the toes. If you  have significant discomfort or your toes become cold or numb, unwrap                 your ACE and rewrap with less tension starting at the toes wrapping upward.  4. Incisions   - Bleeding: You may see some incision sites that are oozing through the bandages. This is not unusual      and can be managed with Rest, Ice, Compression and Elevation (RICE). Apply ice and firm pressure      directly to the site that is bleeding and rest with your leg(s) elevated above your heart for 20-30                 minutes.    Post-Op Day One:  1. Medication   - Ibuprofen: Continue the same as the Day of Your Procedure. If your pain is not controlled with                 ibuprofen, you may take prescription pain medication (such as Norco), if prescribed.  2. Activity   - We would like you to get up at least six times and walk around for short periods of time, unless it is      causing you pain. You should not be on your feet more than 90 minutes at a time. Elevate your leg      above your heart when you are not walking.  3. Bandages   - Your bandages must be kept on and dry until your post procedure nurse visit..  4. Driving   - You may resume driving when you can do so safely. Do not drive if you are taking narcotic pain      medication.  Post-Op Day Two:   1. Medication  - Ibuprofen: Continue the same as the Day of Your Procedure.  2.  Activity  - Walk as tolerated. Elevate as much as possible when not walking.  3. Bandages and Compression  - Remove ACE wrap and padding. Shower and put on your compression hose during waking hours only       for at least 5 days. (Your doctor may instruct you to keep your bandages on until your return     appointment; please follow your doctor's instructions.)  4. Incisions  - Your leg(s) will be bruised; there may be swelling, hard knots under the skin and possibly some     numbness. These will likely resolve over time. If you see  hair-like  strings coming out of your     incisions, do not pull them  (this will only cause pain/discomfort). We will trim them when you come     back for your follow-up appointment.  5. Call Us If:   - You see any areas on your leg that are red and angry in appearance.   - You notice any drainage that is milky or cloudy in appearance or that has a foul odor.   - You run a temperature of 100.5 or greater.    Post-Op Day Three:  You will have a follow up appointment 2-4 days post-procedure. At this appointment, you will have an ultrasound and we will check your incisions.    ________________________________________________________________________________________    The Two Weeks Following Your Procedure  1.  Skin Care   - Do not use any lotions, creams or powders on your incisions for 14 days or until the incisions have                 healed.   - Do not soak in a bathtub, hot tub or go swimming for 14 days or until your incisions have healed.  2.  Medications   - You may use ibuprofen or acetaminophen (e.g., Tylenol) as needed for pain or discomfort.  3.  Activity   - Do not lift over 25 pounds. After about two weeks you may resume exercise such as aerobics,                 running, tennis or weightlifting. Use your common sense and ease back into your exercise routine                 slowly.   - You may feel a cord-like tightness along the inside of your leg. Gentle stretching can be helpful.  4. Compression Hose   - Your doctor may instruct you to wear compression for longer than seven days; please follow your                 doctor's instructions. As a comfort measure, you may choose to wear compression for longer than                 required.  5.  Travel   - Do not fly in an airplane for 14 days after your procedure. If you have a long car trip planned within                 two to three weeks following your procedure, stop and walk for a few minutes every two hours.      Periodic ankle pumps during the ride may be helpful.    Six Week Appointment  - At your six-week appointment,  you will see your surgeon for an exam and evaluation. This office visit     will be scheduled when you return for post-op day three return appointment.       Return to Work  1.  If you work outside the home, you may return to work in a few days depending on the extent of your        procedure, how you tolerate it, and the type of work you perform.  2.  Paperwork: If your employer requires paperwork or you would like a letter written to your employer, please        let us know. We will complete disability type forms at no charge. Please allow five business days for forms        to be completed.            Sclerotherapy: Pre-Treatment Instructions    Recommended Sessions:  ____1__ treatment sessions      Time Required per Treatment Session - About 45 minutes  Please come in 15 minutes before your scheduled appointment.  30 min.  Sclerotherapy treatments last approximately 30 minutes.  5 min.    A staff member will wipe your legs off with warm water and dry them with a wash cloth.                 Then you can put your compression hose on, get dressed and check out.  10 min.  After your treatment, you will be asked to walk around for 10 minutes before you get in your car.    Medications  Five days before your appointment, discontinue aspirin (Bufferin, Anacin, etc.) and Ibuprofen (Motrin, Advil, Aleve, etc.) to reduce bruising. Resume these medications the day following the treatment.    Leg Preparation  Do not shave your legs or apply any oil, lotion or powder the night before or the day of your treatment.    Clothing  Shorts:  Bring a pair of loose, comfortable shorts to wear during your treatment (or you can choose to wear ours). Shoes: Bring comfortable shoes to accommodate the compression hose after your treatment. Do not wear flip-flops or thong-style sandals unless you have open-toe compression hose.    Photographs  Photos will be taken before each treatment. This helps monitor your progress.    Injections  The  physician will inject your veins with the sclerotherapy solution chosen to meet your specific needs.    Compression Hose  Please bring your compression hose if you have them. They may also be reserved for you at our clinic. Compression hose must be worn immediately after each sclerotherapy treatment.  The hose must be compression level 20-30, and they must be worn for 24 hours straight after your treatment.  If you have never worn compression hose before, a staff member will teach you how to put them on.             You cannot have a treatment without compression hose.               They are critical to the success of your treatment!    You may purchase your compression hose from us. We will measure you and have the hose available when you come for your treatment.    Cancellation and Rescheduling  If you need to cancel or reschedule your sclerotherapy treatment, please give our office at least 24 hour notice.    Sclerotherapy: Basic Information    What is sclerotherapy?  Sclerotherapy is a treatment for  spider  veins.  Spider  veins are small veins just under your skin that can look red, blue or purple. Most  spider  veins are only a cosmetic problem.  Spider  veins are not useful and treating them will not affect your circulation.    How does sclerotherapy work?  1.  Injections: A very small needle is used to inject a solution into the  spider  veins. The solution irritates the cells that line the vein walls. This causes the veins to collapse. The vein walls to stick together and they can no longer carry blood. Different solutions are used based on the size of the veins.  2.  Compression:  The spider veins are kept collapsed by wearing compression stockings. Your body will break down and absorb the treated veins. You wear the compression hose for 24 hours after the treatment and then for 4 more days during your waking hours only.    How does the body heal after sclerotherapy?  The process is similar to how your  body heals after a bad bruise. It takes 4-6 weeks or more for the healing to be complete. When the healing is complete, the vein is no longer visible. It may take more than one treatment.    How do I get the best results?  It is important to follow the post-sclerotherapy instructions. The best results require time and patience. The injection sites will continue to heal and fade for months after a treatment. Please discuss your expectations with your doctor to keep them realistic. Your doctor will do everything possible to meet or exceed your expectations.    How many treatments are needed?  After your initial exam, your doctor will give you an estimate of the number of treatments that may be required. It depends upon the size, type, and quantity of your  spider  veins and on the doctor's assessment, your history and expectations. You may end up needing fewer or more treatments.    How soon can I have another treatment?  Additional treatments are scheduled every 4-6 weeks to allow time for the body to respond to the previous treatment.    Common Side Effects:  Itching  The areas that were injected may itch. This is usually mild and lasts less than a day. Do not use lotions or creams on your legs until the injection sites have healed over.    Pain  It is common to have some tenderness at the injection sites. Injection of the solution can be uncomfortable, but is usually well tolerated by most patients. The tenderness is temporary, lasting 24 hours at most. Tylenol or Ibuprofen can be used, if needed, following the product directions.    Bruising  This may occur at the injections sites. Bruising may be minimized by avoiding Aspirin and Ibuprofen products for five days before each treatment session.    Hyperpigmentation  A light brown discoloration of the skin may develop along the veins in the areas injected. Approximately 20-30% of patients treated note the discoloration (which is lighter and less obvious than the  veins that are being treated). The hyperpigmentation usually fades in a couple of weeks, but may take several months to a year to totally resolve. There is a 1% chance of hyperpigmentation continuing after one year.    Trapped blood  A small amount of blood may become trapped and hardened in the veins. This may feel like a knot or cord and it may look dark blue or bruised. This is a common occurrence. You may need to return before your next treatment so this area can be drained to remove the trapped blood. This will reduce the hyperpigmentation that can occur. The chance of this occurring can be decreased with proper use of compression hose after your treatment.    Matting  Matting is the formation of new, fine  spider  veins in the area injected.  It occurs in approximately 10% of patients injected. The exact reason for this is unknown. If untreated, the matting usually resolves in 3 to 12 months, but very rarely, it can be permanent. If the matting does not fade, it can be re-injected.    Rare Side Effects:  Ulceration at injection sites  Very rarely, a small ulcer will occur at the site where a vein is injected. An ulcer can take 4 to 6 weeks to completely heal. A small scar may result.    Allergic reactions  There is a very rare incidence of an allergic reaction to the solution injected. You will be observed for such reaction and will be treated appropriately should it occur. Please inform us of any allergy history.    Pulmonary embolus/Deep vein thrombosis  This is a blood clot which moves to the lungs/a blood clot in the deep vein system. There is an extraordinarily low incidence of this complication.      SCLEROTHERAPY AFTER CARE  Immediately:  After treatment, walk for 10-15 minutes before getting in your car.  If your trip home is more than 1 hour, stop and walk around for 5-10 minutes. Avoid sitting or standing for extended periods.   First 24 hours: Wear your compression continuously, even while in bed.  After the 24 hours, you may shower if you want to. Put your hose back on, unless you are going to bed. You should NOT wear compression to bed after the first 24 hours. You may fly the next day, but wear your compression.   For 5 days: Wear the compression hose for waking hours only. You may continue to wear them longer than 5 days if you prefer.   For days 5-7: Walking is encouraged, as it promotes efficient circulation in your veins. You may do activities that raise your heart rate, but do NOT run, jog, do high impact aerobics, or weight lifting. After 7 days, no activity restrictions.  Shaving: Wait a few days to shave or apply lotion.   Bathing: Do NOT take hot baths or sit in a hot tub for 7-10 days.    For 1 year: Wear SPF 30 sunscreen on your legs when in the sun. This is very important! It helps prevent darkening of the skin at the injection sites.   Medications: You may resume your usual medications, including aspirin or ibuprofen.    Common Things to Expect       Compression must be worn for the first 24 hours and then during the day for 5-7 days.    If larger veins are treated with ultrasound-guided sclerotherapy, you will have redness, firmness, tenderness, and swelling.  This firmness and tenderness may take 3-6 months to resolve. Ibuprofen and compression hose will aid in this process.    You will have bruising that can last up to 3 weeks. Most fading of the veins will occur between 3 and 6 weeks after treatment.    You may notice brown discoloration (hyperpigmentation) at the treatment site.  This should fade with time, but will take 3 months to 1 year to fully heal.     Some treated veins may look darker because of trapped blood within the vein. This trapped blood can be removed at a minimum of 1 month following treatment. Larger veins are more likely to develop trapped blood.    It is very important for you to use at least SPF 30 sunscreen in order to help prevent the discoloration of your  skin.    Migraines rarely occur following sclerotherapy, but are more likely in patients with a history of migraines.  Treat as you would any other migraine.   Thigh High, medical grade, 20-30 mmHg strength, compression stockings are recommended (may be required if having a vein procedure or treatment)    Options for purchasing compression stockings:    Call your insurance company and ask if compression stockings are covered.  If they are covered, they usually fall under your Durable Medical Equipment (DME) coverage.  The DME codes if they ask are: Knee high , Thigh high , Panty .   Be sure to ask if you need a specific medical diagnosis for coverage, if your deductible needs to be met first, how many pairs are covered and how often you can get them.  If insurance covers them and you would like to order from "[employer] or another medical supply company: contact the vein clinic and we will fax a prescription to your medical supply company of choice. They will bill your insurance and ship them to you. Please let us know your color choice (black or beige), and toe choice (open or closed toe) when contacting us.    2.  We sell regular sizes of [employer] in our clinic (except specialty order or petite sizing). Ask us to check if we have your size. We cannot bill your insurance for these purchases.  Knee high are $65/pair  Thigh high are $90/pair.   If you would like to purchase from the clinic, let us know including your color choice (black or beige), and toe choice (open or closed toe). We will set them aside for you to  and pay for at your next clinic appointment or the day of your procedure.    3.  Online website ordering options:   [email or web address]  [email or web address]  [email or web address] has many options available.

## 2025-03-18 DIAGNOSIS — I83.812 VARICOSE VEINS OF LEFT LOWER EXTREMITY WITH PAIN: Primary | ICD-10-CM

## 2025-04-01 ENCOUNTER — TELEPHONE (OUTPATIENT)
Dept: VASCULAR SURGERY | Facility: CLINIC | Age: 45
End: 2025-04-01
Payer: COMMERCIAL

## 2025-04-01 DIAGNOSIS — I83.812 VARICOSE VEINS OF LEFT LOWER EXTREMITY WITH PAIN: Primary | ICD-10-CM

## 2025-04-01 NOTE — TELEPHONE ENCOUNTER
4/1/2025    Vein Clinic Preoperative Nurse Call    Procedure: Left leg VNUS closure GSV, ASV(med nec),(possible ligation &division ASV(med nec), 2 unit phlebs (med nec)   Date: 4/10/25  Surgeon: Dr. Mckay  Time: 0930  Check in time: 0830    Called patient and left a detailed message. Informed patient: when to check in (0830) to sign consent, to bring their preop medications in their original bottle with them (3 mg ativan, 0.1mg clonidine). Patient will take the medications after signing the consent to the procedure. Instructed patient to wear loose-fitting comfortable clothing, and bring their compression hose. Ensured patient has a /someone that will be responsible for them the rest of the day. Once procedure is completed, we will keep patient in recovery for 30-45 mins, and call  with aftercare instructions. Informed patient, that if possible, they should sit in the backseat to elevate their leg on the ride home.    Pt needs Thigh High compression hose for procedure. Status of the hose: patient calling insurance as of last note. Advised patient to call back if having any trouble obtaining these stockings.    Special instructions: Hold aspirin 81 mg 3 days prior to procedure. Last dose on Sunday 4/6, hold starting Monday 4/7.    Patient understands if they have any of the following symptoms (fever, cough, shortness of breath, rash), they need to notify us immediately as they may need to cancel their procedure and reschedule for a later date.    Gave patient our call back number if any further questions or concerns.    Humble Worrell RN  [facility]

## 2025-04-03 RX ORDER — CLONIDINE HYDROCHLORIDE 0.1 MG/1
TABLET ORAL
Qty: 1 TABLET | Refills: 0 | Status: SHIPPED | OUTPATIENT
Start: 2025-04-03

## 2025-04-03 RX ORDER — LORAZEPAM 1 MG/1
TABLET ORAL
Qty: 3 TABLET | Refills: 0 | Status: SHIPPED | OUTPATIENT
Start: 2025-04-03

## 2025-04-03 NOTE — PATIENT INSTRUCTIONS
Post-Procedure Instructions:             VNUS Closure and Phlebectomies      Post-Op Day Zero - The Day of Your Procedure:0  1. Medication for Pain Control and Inflammation Control   - The numbing medication injected during your procedure will last for several hours. The pre-procedure                 tablets may make you very sleepy and you might not remember everything from the procedure or from                 the day. This will usually wear off by the next day.   - Ibuprofen:  If tolerated, take ibuprofen (e.g., Advil) to reduce inflammation whether or not you have                 pain. For three days, take two tablets (200mg each) with every meal and at bedtime with a snack. If                 your pain is not controlled with ibuprofen, you may take prescription pain medication (such as Norco),                 if prescribed.   - You may resume taking any medications you were taking before your procedure.  2. Activity   - Rest with your leg(s) elevated above your heart. This will prevent from a lot of swelling and                 bleeding. You do not need to elevate your leg(s) while sleeping at night. You may go upstairs, sit up to                 eat, use the bathroom, and take several five minute walks. Otherwise, keep your leg(s) elevated.                 Minimize the amount of time you are up on your feet to about 30 minutes at a time.  3. Bandages   - The incision sites will be covered with soft bandages and an ACE wrap. Keep your bandages on and    dry until your post procedure nurse visit. The ACE should provide  snug  compression, but should not cause pain or numbness    in the toes. If you have significant discomfort or your toes become cold or numb, unwrap your ACE and    rewrap with less tension starting at the toes wrapping upward.  4. Incisions   - Bleeding: You may see some incision sites that are oozing through the bandages. This is not unusual    and can be managed with Rest, Ice, Compression and  Elevation (RICE). Apply ice and firm pressure    directly to the site that is bleeding and rest with your leg(s) elevated above your heart for 20-30 minutes.    Post-Op Day One:  1. Medication   - Ibuprofen:  Continue the same as the Day of Your Procedure. If your pain is not controlled with    ibuprofen, you may take prescription pain medication (such as Norco), if prescribed.  2. Activity   - We would like you to get up at least six times and walk around for short periods of time, unless it is    causing you pain. You should not be on your feet more than 90 minutes at a time. Elevate your leg    above your heart when you are not walking.  3. Bandages   - Your bandages must be kept on and dry until your post procedure nurse visit.  4. Driving   - You may resume driving when you can do so safely. Do not drive if you are taking narcotic pain    medication.    Post-Op Day Two:   1. Medication  - Ibuprofen: Continue the same as the Day of Your Procedure.  2.  Activity   - Walk as tolerated. Elevate as much as possible when not walking.  3. Bandages and Compression  - Remove ACE wrap and padding. Shower and put on your compression hose during waking hours only for at least 5 days. (Your doctor may instruct you to keep your bandages on until your return appointment; please follow your doctor's instructions.)  4. Incisions   - Your leg(s) will be bruised; there may be swelling, hard knots under the skin and possibly some    numbness. These will likely resolve over time. If you see  hair-like  strings coming out of your    incisions, do not pull them (this will only cause pain/discomfort). We will trim them when you come   back for your follow-up appointment.  5. Call Us If:   - You see any areas on your leg that are red and angry in appearance.   - You notice any drainage that is milky or cloudy in appearance or that has a foul odor.   - You run a temperature of 100.5 or greater.    Post-Op Day Three:  You will have a  follow up appointment 2-4 days post-procedure. At this appointment, you will have an ultrasound and we will check your incisions.    _______________________________________________________________________________________________    The Two Weeks Following Your Procedure  1.  Skin Care   - Do not use any lotions, creams or powders on your leg for 14 days or until the incisions have healed.   - Do not soak in a bathtub, whirlpool, or hot tub or go swimming for 14 days or until your incisions have  healed.  2.  Medications   - You may use ibuprofen or acetaminophen (e.g., Tylenol) as needed for pain or discomfort.  3.  Activity   - Do not lift over 25 pounds. After about two weeks you may resume exercise such as aerobics, running,    tennis or weight lifting. Use your common sense and ease back into your exercise routine slowly.   - You may feel a cord-like tightness along the inside of your leg. Gentle stretching can be helpful.  4. Compression Hose   - Your doctor may instruct you to wear compression for longer than seven days; please    follow your doctor's instructions. As a comfort measure, you may choose to wear compression for    longer than required.  5.  Travel   - Do not fly in an airplane for 14 days after your procedure. If you have a long car trip planned within    two to three weeks following your procedure, stop and walk for a few minutes every two hours.    Periodic ankle pumps during the ride may be helpful.    Six Week Appointment   At your six week appointment, you will see your surgeon for an exam and evaluation. This office visit    will be scheduled when you return for Post-op Day Three Return Appointment.     Return to Work  1.  If you work outside the home, you may return to work in a few days depending on the extent of your procedure, how you tolerate it, and the type of work you perform.  2.  Paperwork: If your employer requires paperwork or you would like a letter written to your employer,  please let us know. We will complete disability type forms at no charge. Please allow five business days for forms to be completed.

## 2025-04-10 ENCOUNTER — OFFICE VISIT (OUTPATIENT)
Dept: VASCULAR SURGERY | Facility: CLINIC | Age: 45
End: 2025-04-10
Payer: COMMERCIAL

## 2025-04-10 VITALS — HEART RATE: 82 BPM | SYSTOLIC BLOOD PRESSURE: 94 MMHG | DIASTOLIC BLOOD PRESSURE: 63 MMHG | OXYGEN SATURATION: 95 %

## 2025-04-10 DIAGNOSIS — I83.812 VARICOSE VEINS OF LEFT LOWER EXTREMITY WITH PAIN: Primary | ICD-10-CM

## 2025-04-10 RX ORDER — OXYCODONE HYDROCHLORIDE 5 MG/1
5 TABLET ORAL EVERY 6 HOURS PRN
Qty: 4 TABLET | Refills: 0 | Status: SHIPPED | OUTPATIENT
Start: 2025-04-10

## 2025-04-10 NOTE — PROGRESS NOTES
Pre-procedure Nursing Note    Jaylin Day presents to clinic for Vein Procedure  .   /Person Responsible for Patient: Deandre (Spouse)  Phone Number: [phone]    Prophylactic Medication:N/A   Sedation Medication: Ativan, 3 mg ,   Time Taken: 0832 and Clonidine, 0.1 mg,   Time Taken: 0832  Compression Stockings: Patient left hose at home.  The procedure is being performed on LLE.  Patient understanding of procedure matches consent? YES    Patient's pre-procedure medications verified by AM.    Humble Worrell RN on 4/10/2025 at 8:36 AM

## 2025-04-10 NOTE — LETTER
4/10/2025      Jaylin Day  [address] [place] [address]      Dear Colleague,    Thank you for referring your patient, Jaylin Day, to the [facility] [place]. Please see a copy of my visit note below.    Pre-procedure Nursing Note    Jaylin Day presents to clinic for Vein Procedure  .   /Person Responsible for Patient: Deandre (Spouse)  Phone Number: [phone]    Prophylactic Medication:N/A   Sedation Medication: Ativan, 3 mg ,   Time Taken: 08 and Clonidine, 0.1 mg,   Time Taken: 0832  Compression Stockings: Patient left hose at home.  The procedure is being performed on LLE.  Patient understanding of procedure matches consent? YES    Patient's pre-procedure medications verified by AM.    Humble Worrell RN on 4/10/2025 at 8:36 AM        [facility] Procedure Note    Jaylin Day  April 10, 2025    Jaylin Day is a 44 year old year old female. She presents for Vein Procedure  .    BP 94/63   Pulse 82   SpO2 95%         4/10/2025     9:44 AM   Flowsheet Data   Procedure Start Time: 09:44   Prep: Chloraprep   Side: Left   # PHLEB Sites: LEFT LE   Sedation taken: Yes   Pre Pt. Physical / Cognitive Limitations: WNL   TOTAL Local anesthesia Injected (ml): 8   Max Volume Local Anesthesia (ml): 11   TOTAL Tumescent Injected volume (ml): 75   Max Volume Tumescent (ml): 572   Post Pt. Physical / Cognitive Limitations: WNL   Procedure End Time: 11:16   D/C Instructions given, states readiness to leave and escorted to car: Yes       Phlebectomy    Date/Time: 4/10/2025 12:00 PM    Performed by: Km Mckay MD  Authorized by: Km Mckay MD    Time out: Immediately prior to the procedure a time out was called    Preparation: Patient was prepped and draped in usual sterile fashion    1st Assist:  Elvi Prieto, CST/CSFA    Circulator:  Humble Worrell RN    Procedure:  Phlebectomies  Type:  Medically Necessary  Procedure  side:  Left  Stabs:  >20  Patient tolerance:  Patient tolerated the procedure well with no immediate complications  Wrap/Hose:  Wraps      PROCEDURES PERFORMED:  1.  Open ligation and division of left anterior accessory saphenous vein.    2.  Medically necessary stab avulsion phlebectomies of left leg (22 stabs).    OPERATIVE DESCRIPTION:  Details of the procedure including risks of bleeding, infection, paresthesias, deep vein thrombosis, and inability to perform radiofrequency ablation were discussed.  The patient verbalized understanding and proceeded to the treatment room under informed consent.  Blood pressure, pulse, and pulse oximetry were monitored continuously by Humble Worrell RN.  The patient was placed supine on the operating table and her left groin and entire left lower extremity were prepped and draped in the usual sterile fashion.  Timeout was called and we verified the patient's identity, the operative site, and the proposed procedure.  I utilized a portable ultrasound to interrogate the left anterior accessory saphenous vein.  It was found to be very short and became rather tortuous immediately after breaking through fascia.  Under sterile ultrasound guidance I attempted access of the short left anterior accessory saphenous vein.  I was never able to cannulate the vein with the needle and it quickly went into spasm.  I abandon further attempts at accessing the left AASV.  I also used ultrasound to interrogate the described left greater saphenous vein remnant in the distal thigh.  This was obviously too small and short to attempt repeat radiofrequency ablation on that segment.  At this point we anesthetized the premarked varicosities using a tumescent anesthesia composed of 1% lidocaine with epinephrine and bicarbonate combined with a saline solution.  While this was allowed to take effect I anesthetized the skin directly over the proximal left anterior accessory saphenous vein.  I have premarked  this area with ultrasound.  I made a transverse incision at this level.  Dissection proceeded sharply downward through the subcutaneous tissue to isolate the left anterior accessory saphenous vein very close to its confluence with the saphenofemoral junction.  The vein was dissected free for 3 cm and ligated proximally and distally with 2-0 silk ties.  I removed a 2 cm segment of the vein.  We had excellent hemostasis of the wound.  It was closed in layers with interrupted 3-0 Vicryl suture.  Skin was closed with 4-0 Monocryl running subcuticular sutures.  Steri-Strips and a sterile dressing were placed over the wound.    We proceeded with stab avulsion phlebectomy of premarked varicosities.  We made 22 stab wounds with an ophthalmic blade, retrieved the veins with ladonna hooks and avulsed them with mosquito clamps.  Her leg was cleansed and dried.  Petroleum jelly was applied over each of the stab wounds.  Her leg was then dressed with ABD pads, cast padding, and an Ace bandage from the toes to the groin.  She was kept on the table for 30 minutes to ensure satisfactory hemostasis.  I reviewed postprocedural care instructions with the patient and her , Deandre.  I explained in great detail my inability to perform radiofrequency ablation of her left anterior accessory saphenous vein or distal thigh GSV remnant.  I reviewed postprocedural care instructions.  Follow-up will be in this office Monday, April 14 for a routine postoperative check.  She will not require ultrasound since we did not perform radiofrequency ablation.  I plan to see her back 6 weeks postoperatively to reassess her left leg focusing on the distal thigh GSV remnant.  If needed, that would be readily accessible with ultrasound-guided sclerotherapy.  All of their questions were answered and they verbalized full understanding to the above.  She was subsequently taken to her vehicle in a wheelchair.    Km Mckay MD      Again, thank you  for allowing me to participate in the care of your patient.        Sincerely,        Km Mckay MD    Electronically signed No

## 2025-04-10 NOTE — PROGRESS NOTES
[facility] Procedure Note    Jaylin Day  April 10, 2025    Jaylin Day is a 44 year old year old female. She presents for Vein Procedure  .    BP 94/63   Pulse 82   SpO2 95%         4/10/2025     9:44 AM   Flowsheet Data   Procedure Start Time: 09:44   Prep: Chloraprep   Side: Left   # PHLEB Sites: LEFT LE   Sedation taken: Yes   Pre Pt. Physical / Cognitive Limitations: WNL   TOTAL Local anesthesia Injected (ml): 8   Max Volume Local Anesthesia (ml): 11   TOTAL Tumescent Injected volume (ml): 75   Max Volume Tumescent (ml): 572   Post Pt. Physical / Cognitive Limitations: WNL   Procedure End Time: 11:16   D/C Instructions given, states readiness to leave and escorted to car: Yes       Phlebectomy    Date/Time: 4/10/2025 12:00 PM    Performed by: Km Mckay MD  Authorized by: Km Mckay MD    Time out: Immediately prior to the procedure a time out was called    Preparation: Patient was prepped and draped in usual sterile fashion    1st Assist:  Elvi Prieto CST/CSFA    Circulator:  Humble Worrell RN    Procedure:  Phlebectomies  Type:  Medically Necessary  Procedure side:  Left  Stabs:  >20  Patient tolerance:  Patient tolerated the procedure well with no immediate complications  Wrap/Hose:  Wraps      PROCEDURES PERFORMED:  1.  Open ligation and division of left anterior accessory saphenous vein.    2.  Medically necessary stab avulsion phlebectomies of left leg (22 stabs).    OPERATIVE DESCRIPTION:  Details of the procedure including risks of bleeding, infection, paresthesias, deep vein thrombosis, and inability to perform radiofrequency ablation were discussed.  The patient verbalized understanding and proceeded to the treatment room under informed consent.  Blood pressure, pulse, and pulse oximetry were monitored continuously by Humble Worrell RN.  The patient was placed supine on the operating table and her left groin and entire left lower extremity were  prepped and draped in the usual sterile fashion.  Timeout was called and we verified the patient's identity, the operative site, and the proposed procedure.  I utilized a portable ultrasound to interrogate the left anterior accessory saphenous vein.  It was found to be very short and became rather tortuous immediately after breaking through fascia.  Under sterile ultrasound guidance I attempted access of the short left anterior accessory saphenous vein.  I was never able to cannulate the vein with the needle and it quickly went into spasm.  I abandon further attempts at accessing the left AASV.  I also used ultrasound to interrogate the described left greater saphenous vein remnant in the distal thigh.  This was obviously too small and short to attempt repeat radiofrequency ablation on that segment.  At this point we anesthetized the premarked varicosities using a tumescent anesthesia composed of 1% lidocaine with epinephrine and bicarbonate combined with a saline solution.  While this was allowed to take effect I anesthetized the skin directly over the proximal left anterior accessory saphenous vein.  I have premarked this area with ultrasound.  I made a transverse incision at this level.  Dissection proceeded sharply downward through the subcutaneous tissue to isolate the left anterior accessory saphenous vein very close to its confluence with the saphenofemoral junction.  The vein was dissected free for 3 cm and ligated proximally and distally with 2-0 silk ties.  I removed a 2 cm segment of the vein.  We had excellent hemostasis of the wound.  It was closed in layers with interrupted 3-0 Vicryl suture.  Skin was closed with 4-0 Monocryl running subcuticular sutures.  Steri-Strips and a sterile dressing were placed over the wound.    We proceeded with stab avulsion phlebectomy of premarked varicosities.  We made 22 stab wounds with an ophthalmic blade, retrieved the veins with ladonna hooks and avulsed them with  mosquito clamps.  Her leg was cleansed and dried.  Petroleum jelly was applied over each of the stab wounds.  Her leg was then dressed with ABD pads, cast padding, and an Ace bandage from the toes to the groin.  She was kept on the table for 30 minutes to ensure satisfactory hemostasis.  I reviewed postprocedural care instructions with the patient and her , Deandre.  I explained in great detail my inability to perform radiofrequency ablation of her left anterior accessory saphenous vein or distal thigh GSV remnant.  I reviewed postprocedural care instructions.  Follow-up will be in this office Monday, April 14 for a routine postoperative check.  She will not require ultrasound since we did not perform radiofrequency ablation.  I plan to see her back 6 weeks postoperatively to reassess her left leg focusing on the distal thigh GSV remnant.  If needed, that would be readily accessible with ultrasound-guided sclerotherapy.  All of their questions were answered and they verbalized full understanding to the above.  She was subsequently taken to her vehicle in a wheelchair.    Km Mckay MD

## 2025-04-14 ENCOUNTER — ALLIED HEALTH/NURSE VISIT (OUTPATIENT)
Dept: VASCULAR SURGERY | Facility: CLINIC | Age: 45
End: 2025-04-14
Payer: COMMERCIAL

## 2025-04-14 DIAGNOSIS — I83.812 VARICOSE VEINS OF LEFT LOWER EXTREMITY WITH PAIN: Primary | ICD-10-CM

## 2025-04-14 DIAGNOSIS — Z09 POSTOP CHECK: Primary | ICD-10-CM

## 2025-04-14 PROCEDURE — 99207 PR NO CHARGE NURSE ONLY: CPT

## 2025-04-14 NOTE — PATIENT INSTRUCTIONS
Vein Removal (Phlebectomy)  Common Things to Expect    Small lumps may develop beneath phlebectomy sites. This is a normal step in healing.  These should not be painful, but may be tender to the touch. It can take 6 weeks to 3 months for these lumps/firmness to resolve. About 3-4 weeks after your phlebectomies, when the bruising has subsided and the incisions are healed, gentle massage will aid in the healing of these small lumps.  Bruising will look worse before it looks better and can last for 4-6 weeks.  Ankle swelling is not uncommon and may last 4-6 weeks.   Numbness will get better with time, but may take 3 months to a year to resolve.  You may notice that the skin on your legs has become ultra-sensitive to touch. For example, the weight of your sheets may feel painful. This usually resolves in 6 weeks.    For 2 weeks, no weight lifting over 25lbs, no running, and no vigorous aerobic exercise. After this time, ease back into your normal activities. If you do too much too soon, you will have more pain, swelling, and bruising. Keep in mind your body is still healing.  For 2 weeks, do not shave your legs or use lotions, powders, creams to allow proper healing of vein access sites.    Wearing compression hose is not required, but highly recommended following phlebectomies as this will aid in the healing process. Compression will minimize your pain, swelling, and bruising.      If you are experiencing any of the following symptoms, please seek immediate medical attention at your local emergency department.  - Significant pain in the back of the calf possibly with difficulty walking  - Significant swelling and/or tenderness in the back of the calf  - Redness that continues to spread  - Chest pain and/or shortness of breath

## 2025-04-14 NOTE — PROGRESS NOTES
"  April 14, 2025    Vein Clinic Postoperative Nurse Note    Patient is here for her 72 hour postoperative visit.    Procedure: Left leg ligation & division ASV(med nec), (GSV not attempted), 2 units phlebs (med nec)  Procedure Date: 4/10/25  Surgeon: Dr. Mckay    Ultrasound Result: n/a - no closure done    Physical Exam: Incisions are approximated without signs of infection.  Ecchymosis: moderate  Swelling: mild  Paresthesia: patient denies any new numbness. Has baseline numbness to toes from \"old back surgery\"    Patient Questions or Concerns: none    Reviewed postoperative instructions with patient and provided her with written material of common things to expect from her procedure.    Patient's Next Vein Clinic Appointment: 6 wk post op on 6/2/25 with US duplex prior to appt to reassess ASV/GSV area    Humble Worrell RN  [facility]  "

## 2025-06-02 ENCOUNTER — OFFICE VISIT (OUTPATIENT)
Dept: VASCULAR SURGERY | Facility: CLINIC | Age: 45
End: 2025-06-02
Payer: COMMERCIAL

## 2025-06-02 DIAGNOSIS — I83.812 VARICOSE VEINS OF LEFT LOWER EXTREMITY WITH PAIN: Primary | ICD-10-CM

## 2025-06-02 DIAGNOSIS — Z98.890 STATUS POST ENDOVENOUS RADIOFREQUENCY ABLATION (RFA) OF SAPHENOUS VEIN: ICD-10-CM

## 2025-06-02 PROCEDURE — 99024 POSTOP FOLLOW-UP VISIT: CPT | Performed by: SURGERY

## 2025-06-02 NOTE — PATIENT INSTRUCTIONS
Procedure Plan: Left leg ultrasound guided sclerotherapy up to 2 sessions    We will submit this to your insurance and call you when we receive a response.    If it has been more than 3 weeks and you have not heard from our surgery scheduler, please call our office regarding the status of your insurance authorization.    Mary Wade, Surgery Scheduler - [phone].         Sclerotherapy: Pre-Treatment Instructions    Recommended Sessions:  2 treatment sessions    Time Required per Treatment Session - About 45 minutes  Please come in 15 minutes before your scheduled appointment.  30 min.  Sclerotherapy treatments last approximately 30 minutes.  5 min.    A staff member will wipe your legs off with warm water and dry them with a wash cloth.                 Then you can put your compression hose on, get dressed and check out.  10 min.  After your treatment, you will be asked to walk around for 10 minutes before you get in your car.    Medications  Five days before your appointment, discontinue aspirin (Bufferin, Anacin, etc.) and Ibuprofen (Motrin, Advil, Aleve, etc.) to reduce bruising. Resume these medications the day following the treatment.    Leg Preparation  Do not shave your legs or apply any oil, lotion or powder the night before or the day of your treatment.    Clothing  Shorts:  Bring a pair of loose, comfortable shorts to wear during your treatment (or you can choose to wear ours). Shoes: Bring comfortable shoes to accommodate the compression hose after your treatment. Do not wear flip-flops or thong-style sandals unless you have open-toe compression hose.    Photographs  Photos will be taken before each treatment. This helps monitor your progress.    Injections  The physician will inject your veins with the sclerotherapy solution chosen to meet your specific needs.    Compression Hose  Please bring your compression hose if you have them. They may also be reserved for you at our clinic. Compression  hose must be worn immediately after each sclerotherapy treatment.  The hose must be compression level 20-30, and they must be worn for 24 hours straight after your treatment.  If you have never worn compression hose before, a staff member will teach you how to put them on.             You cannot have a treatment without compression hose.               They are critical to the success of your treatment!    You may purchase your compression hose from us. We will measure you and have the hose available when you come for your treatment.    Cancellation and Rescheduling  If you need to cancel or reschedule your sclerotherapy treatment, please give our office at least 24 hour notice.    Sclerotherapy: Basic Information    What is sclerotherapy?  Sclerotherapy is a treatment for  spider  veins.  Spider  veins are small veins just under your skin that can look red, blue or purple. Most  spider  veins are only a cosmetic problem.  Spider  veins are not useful and treating them will not affect your circulation.    How does sclerotherapy work?  1.  Injections: A very small needle is used to inject a solution into the  spider  veins. The solution irritates the cells that line the vein walls. This causes the veins to collapse. The vein walls to stick together and they can no longer carry blood. Different solutions are used based on the size of the veins.  2.  Compression:  The spider veins are kept collapsed by wearing compression stockings. Your body will break down and absorb the treated veins. You wear the compression hose for 24 hours after the treatment and then for 4 more days during your waking hours only.    How does the body heal after sclerotherapy?  The process is similar to how your body heals after a bad bruise. It takes 4-6 weeks or more for the healing to be complete. When the healing is complete, the vein is no longer visible. It may take more than one treatment.    How do I get the best results?  It is important  to follow the post-sclerotherapy instructions. The best results require time and patience. The injection sites will continue to heal and fade for months after a treatment. Please discuss your expectations with your doctor to keep them realistic. Your doctor will do everything possible to meet or exceed your expectations.    How many treatments are needed?  After your initial exam, your doctor will give you an estimate of the number of treatments that may be required. It depends upon the size, type, and quantity of your  spider  veins and on the doctor's assessment, your history and expectations. You may end up needing fewer or more treatments.    How soon can I have another treatment?  Additional treatments are scheduled every 4-6 weeks to allow time for the body to respond to the previous treatment.    Common Side Effects:  Itching  The areas that were injected may itch. This is usually mild and lasts less than a day. Do not use lotions or creams on your legs until the injection sites have healed over.    Pain  It is common to have some tenderness at the injection sites. Injection of the solution can be uncomfortable, but is usually well tolerated by most patients. The tenderness is temporary, lasting 24 hours at most. Tylenol or Ibuprofen can be used, if needed, following the product directions.    Bruising  This may occur at the injections sites. Bruising may be minimized by avoiding Aspirin and Ibuprofen products for five days before each treatment session.    Hyperpigmentation  A light brown discoloration of the skin may develop along the veins in the areas injected. Approximately 20-30% of patients treated note the discoloration (which is lighter and less obvious than the veins that are being treated). The hyperpigmentation usually fades in a couple of weeks, but may take several months to a year to totally resolve. There is a 1% chance of hyperpigmentation continuing after one year.    Trapped blood  A small  amount of blood may become trapped and hardened in the veins. This may feel like a knot or cord and it may look dark blue or bruised. This is a common occurrence. You may need to return before your next treatment so this area can be drained to remove the trapped blood. This will reduce the hyperpigmentation that can occur. The chance of this occurring can be decreased with proper use of compression hose after your treatment.    Matting  Matting is the formation of new, fine  spider  veins in the area injected.  It occurs in approximately 10% of patients injected. The exact reason for this is unknown. If untreated, the matting usually resolves in 3 to 12 months, but very rarely, it can be permanent. If the matting does not fade, it can be re-injected.    Rare Side Effects:  Ulceration at injection sites  Very rarely, a small ulcer will occur at the site where a vein is injected. An ulcer can take 4 to 6 weeks to completely heal. A small scar may result.    Allergic reactions  There is a very rare incidence of an allergic reaction to the solution injected. You will be observed for such reaction and will be treated appropriately should it occur. Please inform us of any allergy history.    Pulmonary embolus/Deep vein thrombosis  This is a blood clot which moves to the lungs/a blood clot in the deep vein system. There is an extraordinarily low incidence of this complication.      SCLEROTHERAPY AFTER CARE  Immediately:  After treatment, walk for 10-15 minutes before getting in your car.  If your trip home is more than 1 hour, stop and walk around for 5-10 minutes. Avoid sitting or standing for extended periods.   First 24 hours: Wear your compression continuously, even while in bed. After the 24 hours, you may shower if you want to. Put your hose back on, unless you are going to bed. You should NOT wear compression to bed after the first 24 hours. You may fly the next day, but wear your compression.   For 5 days: Wear the  compression hose for waking hours only. You may continue to wear them longer than 5 days if you prefer.   For days 5-7: Walking is encouraged, as it promotes efficient circulation in your veins. You may do activities that raise your heart rate, but do NOT run, jog, do high impact aerobics, or weight lifting. After 7 days, no activity restrictions.  Shaving: Wait a few days to shave or apply lotion.   Bathing: Do NOT take hot baths or sit in a hot tub for 7-10 days.    For 1 year: Wear SPF 30 sunscreen on your legs when in the sun. This is very important! It helps prevent darkening of the skin at the injection sites.   Medications: You may resume your usual medications, including aspirin or ibuprofen.    Common Things to Expect       Compression must be worn for the first 24 hours and then during the day for 5-7 days.    If larger veins are treated with ultrasound-guided sclerotherapy, you will have redness, firmness, tenderness, and swelling.  This firmness and tenderness may take 3-6 months to resolve. Ibuprofen and compression hose will aid in this process.    You will have bruising that can last up to 3 weeks. Most fading of the veins will occur between 3 and 6 weeks after treatment.    You may notice brown discoloration (hyperpigmentation) at the treatment site.  This should fade with time, but will take 3 months to 1 year to fully heal.     Some treated veins may look darker because of trapped blood within the vein. This trapped blood can be removed at a minimum of 1 month following treatment. Larger veins are more likely to develop trapped blood.    It is very important for you to use at least SPF 30 sunscreen in order to help prevent the discoloration of your skin.    Migraines rarely occur following sclerotherapy, but are more likely in patients with a history of migraines.  Treat as you would any other migraine.

## 2025-06-02 NOTE — PROGRESS NOTES
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  She underwent RFA of her left GSV with multiple left leg phlebectomies by an outside surgeon in 2014.  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  She failed conservative therapy.  I performed open ligation and division of her left AASV with 22 medically necessary left leg stab phlebectomies on 4/10/2025.  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  At my procedure this was too short to access for attempted reablation.    She returns 6 weeks postoperatively at my request to interrogate her left leg for purposes of assessing her venous anatomy with consideration for ultrasound-guided sclerotherapy of the distal left thigh GSV remnant and proximal thigh varicosities.  She still notes some aching and heaviness in her left leg at the end of a long day.  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  She continues to wear a thigh-high compression stocking.    Exam:  Well-developed female alert and oriented x 3.  Nicely healed left groin incision at the saphenous cutdown site.  All left thigh stab phlebectomy sites are well-healed.  No missed bulging varicosities.    I utilized a portable ultrasound to do a bedside exam.  I can see the ligated left anterior accessory saphenous vein which no longer communicates with the saphenofemoral junction.  It travels in a straight course for about 6 cm before breaking through fascia and becoming a 5 mm varicosity coursing down the thigh.  I can also visualize the recanalized left greater saphenous vein remnant in the distal thigh which is patent for about 6 cm and then becomes prominent tributaries coursing down the medial left calf.    IMPRESSION:  1.  6 weeks status post open cutdown with ligation and division of the left anterior accessory saphenous  vein and 22 medically necessary left leg stab phlebectomies clinically improved but still with some left thigh symptoms.    2.  Remote history of radiofrequency ablation of the left greater saphenous vein now with recanalized segment of greater saphenous vein in the distal left thigh which becomes incompetent tributaries coursing medially into the left calf.    RECOMMENDATION:  I reviewed all the above with Shelley.  I believe she would benefit from some limited medically necessary ultrasound-guided sclerotherapy of the left greater saphenous vein remnant in the distal thigh as well as the remnant left anterior accessory saphenous vein with incompetent tributaries.  I reviewed the specifics of this including potential complications and the anticipated postprocedural course.  She understands that it might take a second session of ultrasound-guided sclerotherapy to adequately ablate the above-named structures.  All of her questions were answered and she verbalizes full understanding.  Pending preauthorization from her insurance company I would hope to perform medically necessary ultrasound-guided sclerotherapy of the remnant left anterior accessory saphenous vein with its tributaries and the recanalized segment of the distal left thigh greater saphenous vein and its tributaries in a timely manner.    Total length of this encounter was 30 minutes with time spent reviewing records, interviewing and examining the patient, performing a bedside ultrasound, answering questions, and developing a treatment plan.    Rolo Mckay MD

## 2025-06-02 NOTE — LETTER
6/2/2025      Jaylin Day  [address] [place] [address]      Dear Colleague,    Thank you for referring your patient, Jaylin Day, to the [facility] [place]. Please see a copy of my visit note below.    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  She underwent RFA of her left GSV with multiple left leg phlebectomies by an outside surgeon in 2014.  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  She failed conservative therapy.  I performed open ligation and division of her left AASV with 22 medically necessary left leg stab phlebectomies on 4/10/2025.  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  At my procedure this was too short to access for attempted reablation.    She returns 6 weeks postoperatively at my request to interrogate her left leg for purposes of assessing her venous anatomy with consideration for ultrasound-guided sclerotherapy of the distal left thigh GSV remnant and proximal thigh varicosities.  She still notes some aching and heaviness in her left leg at the end of a long day.  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  She continues to wear a thigh-high compression stocking.    Exam:  Well-developed female alert and oriented x 3.  Nicely healed left groin incision at the saphenous cutdown site.  All left thigh stab phlebectomy sites are well-healed.  No missed bulging varicosities.    I utilized a portable ultrasound to do a bedside exam.  I can see the ligated left anterior accessory saphenous vein which no longer communicates with the saphenofemoral junction.  It travels in a straight course for about 6 cm before breaking through fascia and becoming a 5 mm varicosity coursing down the thigh.  I can also visualize the recanalized left greater saphenous vein  remnant in the distal thigh which is patent for about 6 cm and then becomes prominent tributaries coursing down the medial left calf.    IMPRESSION:  1.  6 weeks status post open cutdown with ligation and division of the left anterior accessory saphenous vein and 22 medically necessary left leg stab phlebectomies clinically improved but still with some left thigh symptoms.    2.  Remote history of radiofrequency ablation of the left greater saphenous vein now with recanalized segment of greater saphenous vein in the distal left thigh which becomes incompetent tributaries coursing medially into the left calf.    RECOMMENDATION:  I reviewed all the above with Shelley.  I believe she would benefit from some limited medically necessary ultrasound-guided sclerotherapy of the left greater saphenous vein remnant in the distal thigh as well as the remnant left anterior accessory saphenous vein with incompetent tributaries.  I reviewed the specifics of this including potential complications and the anticipated postprocedural course.  She understands that it might take a second session of ultrasound-guided sclerotherapy to adequately ablate the above-named structures.  All of her questions were answered and she verbalizes full understanding.  Pending preauthorization from her insurance company I would hope to perform medically necessary ultrasound-guided sclerotherapy of the remnant left anterior accessory saphenous vein with its tributaries and the recanalized segment of the distal left thigh greater saphenous vein and its tributaries in a timely manner.    Total length of this encounter was 30 minutes with time spent reviewing records, interviewing and examining the patient, performing a bedside ultrasound, answering questions, and developing a treatment plan.    Rolo Mckay MD      Again, thank you for allowing me to participate in the care of your patient.        Sincerely,        Km Mckay MD    Electronically  signed

## 2025-06-03 NOTE — PROGRESS NOTES
June 2, 2025    Vein Procedure Recommendation    Spoke with patient in clinic.    Dr. Mckay has recommended patient to have the following vein procedure(s):     1. Left leg Ultrasound Guided Sclerotherapy (medically necessary)    Patient Pre-op Questions:  Preferred Pharmacy: [facility]  Anticoagulant/ASA: Yes, Aspirin 81 mg - No hold for US sclero  Artificial Joint or Heart Valve:  No  Open ulcer:  No  Sedation nausea: N/A      Patient is recommended to wear Thigh High compression hose following her procedure. Patient has thigh high compression.    Handed patient written procedure instructions to review on her own (see After Visit Summary).    Next steps:    Insurance Submission  Informed patient this process could take up to 14 business days, but once approved, the patient will be contacted by our surgery scheduler to schedule the above procedure. Gave patient our surgery scheduler's information.    Informed patient to call our office regarding the status of their insurance authorization if it has been more than 3 weeks and have not heard from our surgery scheduler.    Patient is in agreement with all of the above and has no further questions at this time.    Humble Worrell RN  [facility]  Vein Clinic

## 2025-06-30 ENCOUNTER — PATIENT OUTREACH (OUTPATIENT)
Dept: CARE COORDINATION | Facility: CLINIC | Age: 45
End: 2025-06-30
Payer: COMMERCIAL

## (undated) DEVICE — PREP CHLORAPREP CLEAR 3ML 260400

## (undated) DEVICE — LINEN TOWEL PACK X30 5481

## (undated) DEVICE — DRAPE MAYO STAND 23X54 8337

## (undated) DEVICE — SPONGE COTTONOID 1/2X1/2" 80-1400

## (undated) DEVICE — SUCTION MANIFOLD DORNOCH ULTRA CART UL-CL500

## (undated) DEVICE — SU MONOCRYL 4-0 PS-2 27" UND Y426H

## (undated) DEVICE — DRAPE MICROSCOPE LEICA 54X150" AR8033650

## (undated) DEVICE — DRSG PRIMAPORE 03 1/8X6" 66000318

## (undated) DEVICE — LINEN TOWEL PACK X6 WHITE 5487

## (undated) DEVICE — DRAPE STERI TOWEL LG 1010

## (undated) DEVICE — GLOVE PROTEXIS BLUE W/NEU-THERA 7.0  2D73EB70

## (undated) DEVICE — SU VICRYL 2-0 CT-2 CR 8X18" J726D

## (undated) DEVICE — DRAPE C-ARM W/STRAPS 42X72" 07-CA104

## (undated) DEVICE — PACK NEURO MINOR UMMC SNE32MNMU4

## (undated) DEVICE — SOL WATER IRRIG 1000ML BOTTLE 2F7114

## (undated) DEVICE — NDL BLUNT 17GA 1.5" 8881202330

## (undated) DEVICE — SPONGE SURGIFOAM 100 1974

## (undated) DEVICE — SU ETHILON 3-0 PS-1 18" 1663H

## (undated) DEVICE — SU VICRYL 0 UR-6 27" J603H

## (undated) DEVICE — SU DERMABOND ADVANCED .7ML DNX12

## (undated) DEVICE — GLOVE PROTEXIS MICRO 7.0  2D73PM70

## (undated) DEVICE — ESU ELEC BLADE 6" COATED E1450-6

## (undated) DEVICE — SYR 30ML LL W/O NDL 302832

## (undated) DEVICE — SPONGE SURGIFOAM 01GM POWDER 1978

## (undated) DEVICE — NDL SPINAL 18GA 3.5" 405184

## (undated) RX ORDER — BUPIVACAINE HYDROCHLORIDE AND EPINEPHRINE 5; 5 MG/ML; UG/ML
INJECTION, SOLUTION EPIDURAL; INTRACAUDAL; PERINEURAL
Status: DISPENSED
Start: 2019-04-02

## (undated) RX ORDER — ETOMIDATE 2 MG/ML
INJECTION INTRAVENOUS
Status: DISPENSED
Start: 2019-04-02

## (undated) RX ORDER — CEFAZOLIN SODIUM 2 G/100ML
INJECTION, SOLUTION INTRAVENOUS
Status: DISPENSED
Start: 2019-04-02

## (undated) RX ORDER — HYDROMORPHONE HYDROCHLORIDE 1 MG/ML
INJECTION, SOLUTION INTRAMUSCULAR; INTRAVENOUS; SUBCUTANEOUS
Status: DISPENSED
Start: 2019-04-02

## (undated) RX ORDER — ONDANSETRON 2 MG/ML
INJECTION INTRAMUSCULAR; INTRAVENOUS
Status: DISPENSED
Start: 2019-04-02

## (undated) RX ORDER — PROPOFOL 10 MG/ML
INJECTION, EMULSION INTRAVENOUS
Status: DISPENSED
Start: 2019-04-02

## (undated) RX ORDER — CEFAZOLIN SODIUM 1 G/3ML
INJECTION, POWDER, FOR SOLUTION INTRAMUSCULAR; INTRAVENOUS
Status: DISPENSED
Start: 2019-04-02

## (undated) RX ORDER — LIDOCAINE HYDROCHLORIDE 20 MG/ML
INJECTION, SOLUTION EPIDURAL; INFILTRATION; INTRACAUDAL; PERINEURAL
Status: DISPENSED
Start: 2019-04-02

## (undated) RX ORDER — DEXAMETHASONE SODIUM PHOSPHATE 4 MG/ML
INJECTION, SOLUTION INTRA-ARTICULAR; INTRALESIONAL; INTRAMUSCULAR; INTRAVENOUS; SOFT TISSUE
Status: DISPENSED
Start: 2019-04-02

## (undated) RX ORDER — ACETAMINOPHEN 325 MG/1
TABLET ORAL
Status: DISPENSED
Start: 2019-04-02

## (undated) RX ORDER — FENTANYL CITRATE 50 UG/ML
INJECTION, SOLUTION INTRAMUSCULAR; INTRAVENOUS
Status: DISPENSED
Start: 2019-04-02

## (undated) RX ORDER — GABAPENTIN 300 MG/1
CAPSULE ORAL
Status: DISPENSED
Start: 2019-04-02

## (undated) RX ORDER — BACITRACIN 50000 [IU]/1
INJECTION, POWDER, FOR SOLUTION INTRAMUSCULAR
Status: DISPENSED
Start: 2019-04-02